# Patient Record
Sex: FEMALE | Race: WHITE | Employment: OTHER | URBAN - METROPOLITAN AREA
[De-identification: names, ages, dates, MRNs, and addresses within clinical notes are randomized per-mention and may not be internally consistent; named-entity substitution may affect disease eponyms.]

---

## 2017-01-04 ENCOUNTER — HOSPITAL ENCOUNTER (OUTPATIENT)
Dept: PHYSICAL THERAPY | Age: 67
Discharge: HOME | End: 2017-01-04
Payer: OTHER MISCELLANEOUS

## 2017-01-04 PROCEDURE — 97530 THERAPEUTIC ACTIVITIES: CPT

## 2017-01-04 PROCEDURE — 97161 PT EVAL LOW COMPLEX 20 MIN: CPT

## 2017-01-04 PROCEDURE — G8979 MOBILITY GOAL STATUS: HCPCS

## 2017-01-04 PROCEDURE — G8978 MOBILITY CURRENT STATUS: HCPCS

## 2017-01-04 NOTE — PROGRESS NOTES
[facility]  [employer] OF [address]  [phone] fax    Plan of Care/ Statement of Necessity for Physical Therapy Services    Patient name: Ryan Le Start of Care: 2017   Referral source: Bruno Rendon : 1950    Medical Diagnosis: There are no admission diagnoses documented for this encounter. Onset Date: fall 2/27/15, surgery 12/2/15    Treatment Diagnosis: s/p R SIJ fusion    Prior Hospitalization: see medical history Provider#: [id number]   Medications: Verified on Patient summary List    Comorbidities: osteoporosis, depression, multiple spinal fusions, neuropathy R foot   Prior Level of Function: lives alone in a 1 story home with 3 steps to enter and B HR, Ind with housework and ambulation prior to surgery, currently daughter assists with LE dressing and housework, rollator for ambulation since surgery, RN prior to fall 2/27/15      The Plan of Care and following information is based on the information from the initial evaluation. Assessment/ key information: Pt is a 72 yo F who presents s/p R SIJ fusion after failed conservative treatment following a fall 2/27/15 during which she slipped on ice. Subjective reports of pain increased with sitting >10 minutes, walking/standing immediately, getting up from sitting, and sidelying B and relieved with pain medication. She also reports new onset of R thigh pain since the surgery. Pt is TTP over R greater trochanter and along R ITB/quadriceps. Incision is healed without signs of complication. Pt demonstrates significantly decreased BLE strength R >L and LLD of 0.25cm, likely contributing to increased weight-bearing/weight-shift to R. Pt will benefit from skilled PT to address ROM, strength, flexibility, balance, core/hip stability, and functional mobility deficits in order to improve ease of weight-bearing tasks and return to PLOF.         Evaluation Complexity History HIGH Complexity :3+ comorbidities / personal factors will impact the outcome/ POC ; Examination MEDIUM Complexity : 3 Standardized tests and measures addressing body structure, function, activity limitation and / or participation in recreation  ;Presentation MEDIUM Complexity : Evolving with changing characteristics  ; Clinical Decision Making MEDIUM Complexity : FOTO score of 26-74  Overall Complexity Rating: MEDIUM  Problem List: pain affecting function, decrease ROM, decrease strength, impaired gait/ balance, decrease ADL/ functional abilitiies, decrease activity tolerance, decrease flexibility/ joint mobility and decrease transfer abilities   Treatment Plan may include any combination of the following: Therapeutic exercise, Therapeutic activities, Neuromuscular re-education, Physical agent/modality, Gait/balance training, Manual therapy, Patient education, Self Care training, Functional mobility training, Home safety training and Stair training  Patient / Family readiness to learn indicated by: asking questions, trying to perform skills and interest  Persons(s) to be included in education: patient (P) and family support person (FSP);list daughter  Barriers to Learning/Limitations: None  Patient Goal (s): better mobility, less pain  Patient Self Reported Health Status: poor  Rehabilitation Potential: good    Short Term Goals: To be accomplished in 1 weeks:  1. Pt will be compliant with initial HEP  Long Term Goals: To be accomplished in 6 weeks:  1. Pt will improve FOTO by 13 points in order to demonstrate functional improvement   2. Pt will improve B hip flex/IR/ER strength to 4/5 in order to improve hip stability for improved weight-bearing tolerance  3. Pt will improve standing/ambulatory tolerance to 10 minutes in order to improve pt's ability to participate in family functions  4. Pt will report <5/10 average pain in order to improve QOL.    Frequency / Duration: Patient to be seen 2-3 times per week for 6 weeks. Patient/ Caregiver education and instruction: Diagnosis, prognosis, exercises   [x]  Plan of care has been reviewed with PTA    G-Codes (GP)  Mobility   Current  CL= 60-79%   Goal  CK= 40-59%    The severity rating is based on clinical judgment and the FOTO score. Certification Period: 1/4/17 to 4/4/17  Julien Delaney, PT 1/4/2017 11:04 AM    ________________________________________________________________________    I certify that the above Therapy Services are being furnished while the patient is under my care. I agree with the treatment plan and certify that this therapy is necessary.     Physician's Signature:____________________  Date:____________Time: _________    Please sign and return to [facility]  [address] [facility]  [phone] fax
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
## 2017-01-06 ENCOUNTER — HOSPITAL ENCOUNTER (OUTPATIENT)
Dept: PHYSICAL THERAPY | Age: 67
Discharge: HOME | End: 2017-01-06
Payer: OTHER MISCELLANEOUS

## 2017-01-09 ENCOUNTER — APPOINTMENT (OUTPATIENT)
Dept: PHYSICAL THERAPY | Age: 67
End: 2017-01-09
Payer: OTHER MISCELLANEOUS

## 2017-01-11 ENCOUNTER — HOSPITAL ENCOUNTER (OUTPATIENT)
Dept: PHYSICAL THERAPY | Age: 67
End: 2017-01-11
Payer: OTHER MISCELLANEOUS

## 2017-01-12 ENCOUNTER — APPOINTMENT (OUTPATIENT)
Dept: PHYSICAL THERAPY | Age: 67
End: 2017-01-12
Payer: OTHER MISCELLANEOUS

## 2017-01-16 ENCOUNTER — APPOINTMENT (OUTPATIENT)
Dept: PHYSICAL THERAPY | Age: 67
End: 2017-01-16
Payer: OTHER MISCELLANEOUS

## 2017-01-18 ENCOUNTER — APPOINTMENT (OUTPATIENT)
Dept: PHYSICAL THERAPY | Age: 67
End: 2017-01-18
Payer: OTHER MISCELLANEOUS

## 2017-01-20 ENCOUNTER — APPOINTMENT (OUTPATIENT)
Dept: PHYSICAL THERAPY | Age: 67
End: 2017-01-20
Payer: OTHER MISCELLANEOUS

## 2017-01-23 ENCOUNTER — APPOINTMENT (OUTPATIENT)
Dept: PHYSICAL THERAPY | Age: 67
End: 2017-01-23
Payer: OTHER MISCELLANEOUS

## 2017-01-25 ENCOUNTER — APPOINTMENT (OUTPATIENT)
Dept: PHYSICAL THERAPY | Age: 67
End: 2017-01-25
Payer: OTHER MISCELLANEOUS

## 2017-01-27 ENCOUNTER — APPOINTMENT (OUTPATIENT)
Dept: PHYSICAL THERAPY | Age: 67
End: 2017-01-27
Payer: OTHER MISCELLANEOUS

## 2017-01-30 ENCOUNTER — APPOINTMENT (OUTPATIENT)
Dept: PHYSICAL THERAPY | Age: 67
End: 2017-01-30
Payer: OTHER MISCELLANEOUS

## 2017-02-01 ENCOUNTER — APPOINTMENT (OUTPATIENT)
Dept: PHYSICAL THERAPY | Age: 67
End: 2017-02-01

## 2017-02-03 ENCOUNTER — APPOINTMENT (OUTPATIENT)
Dept: PHYSICAL THERAPY | Age: 67
End: 2017-02-03

## 2017-02-20 ENCOUNTER — OFFICE VISIT (OUTPATIENT)
Dept: ORTHOPEDIC SURGERY | Facility: CLINIC | Age: 67
End: 2017-02-20

## 2017-02-20 VITALS
HEART RATE: 70 BPM | TEMPERATURE: 98 F | WEIGHT: 177 LBS | SYSTOLIC BLOOD PRESSURE: 148 MMHG | DIASTOLIC BLOOD PRESSURE: 82 MMHG | BODY MASS INDEX: 28.57 KG/M2

## 2017-02-20 DIAGNOSIS — M17.12 PRIMARY OSTEOARTHRITIS OF LEFT KNEE: Primary | ICD-10-CM

## 2017-02-20 DIAGNOSIS — Z98.1 S/P LUMBAR FUSION: ICD-10-CM

## 2017-02-20 DIAGNOSIS — M17.11 PRIMARY OSTEOARTHRITIS OF RIGHT KNEE: ICD-10-CM

## 2017-02-20 DIAGNOSIS — M25.562 LEFT KNEE PAIN, UNSPECIFIED CHRONICITY: ICD-10-CM

## 2017-02-20 RX ORDER — HYALURONATE SODIUM 10 MG/ML
2 SYRINGE (ML) INTRAARTICULAR ONCE
Qty: 2 ML | Refills: 0
Start: 2017-02-20 | End: 2017-02-20

## 2017-02-20 NOTE — PATIENT INSTRUCTIONS
Knee Arthritis: Exercises  Your Care Instructions  Here are some examples of exercises for knee arthritis. Start each exercise slowly. Ease off the exercise if you start to have pain. Your doctor or physical therapist will tell you when you can start these exercises and which ones will work best for you. How to do the exercises  Knee flexion with heel slide    1. Lie on your back with your knees bent. 2. Slide your heel back by bending your affected knee as far as you can. Then hook your other foot around your ankle to help pull your heel even farther back. 3. Hold for about 6 seconds, then rest for up to 10 seconds. 4. Repeat 8 to 12 times. 5. Switch legs and repeat steps 1 through 4, even if only one knee is sore. Quad sets    1. Sit with your affected leg straight and supported on the floor or a firm bed. Place a small, rolled-up towel under your knee. Your other leg should be bent, with that foot flat on the floor. 2. Tighten the thigh muscles of your affected leg by pressing the back of your knee down into the towel. 3. Hold for about 6 seconds, then rest for up to 10 seconds. 4. Repeat 8 to 12 times. 5. Switch legs and repeat steps 1 through 4, even if only one knee is sore. Straight-leg raises to the front    1. Lie on your back with your good knee bent so that your foot rests flat on the floor. Your affected leg should be straight. Make sure that your low back has a normal curve. You should be able to slip your hand in between the floor and the small of your back, with your palm touching the floor and your back touching the back of your hand. 2. Tighten the thigh muscles in your affected leg by pressing the back of your knee flat down to the floor. Hold your knee straight. 3. Keeping the thigh muscles tight and your leg straight, lift your affected leg up so that your heel is about 12 inches off the floor. Hold for about 6 seconds, then lower slowly.   4. Relax for up to 10 seconds between repetitions. 5. Repeat 8 to 12 times. 6. Switch legs and repeat steps 1 through 5, even if only one knee is sore. Active knee flexion    1. Lie on your stomach with your knees straight. If your kneecap is uncomfortable, roll up a washcloth and put it under your leg just above your kneecap. 2. Lift the foot of your affected leg by bending the knee so that you bring the foot up toward your buttock. If this motion hurts, try it without bending your knee quite as far. This may help you avoid any painful motion. 3. Slowly move your leg up and down. 4. Repeat 8 to 12 times. 5. Switch legs and repeat steps 1 through 4, even if only one knee is sore. Quadriceps stretch (facedown)    1. Lie flat on your stomach, and rest your face on the floor. 2. Wrap a towel or belt strap around the lower part of your affected leg. Then use the towel or belt strap to slowly pull your heel toward your buttock until you feel a stretch. 3. Hold for about 15 to 30 seconds, then relax your leg against the towel or belt strap. 4. Repeat 2 to 4 times. 5. Switch legs and repeat steps 1 through 4, even if only one knee is sore. Stationary exercise bike    If you do not have a stationary exercise bike at home, you can find one to ride at your local health club or community center. 1. Adjust the height of the bike seat so that your knee is slightly bent when your leg is extended downward. If your knee hurts when the pedal reaches the top, you can raise the seat so that your knee does not bend as much. 2. Start slowly. At first, try to do 5 to 10 minutes of cycling with little to no resistance. Then increase your time and the resistance bit by bit until you can do 20 to 30 minutes without pain. 3. If you start to have pain, rest your knee until your pain gets back to the level that is normal for you. Or cycle for less time or with less effort. Follow-up care is a key part of your treatment and safety.  Be sure to make and go to all appointments, and call your doctor if you are having problems. It's also a good idea to know your test results and keep a list of the medicines you take. Where can you learn more? Go to http://jag-michelle.info/. Enter C159 in the search box to learn more about \"Knee Arthritis: Exercises. \"  Current as of: May 23, 2016  Content Version: 11.1  © 2006-2016 [employer]. Care instructions adapted under license by [facility] (which disclaims liability or warranty for this information). If you have questions about a medical condition or this instruction, always ask your healthcare professional. [address] any warranty or liability for your use of this information. Hyaluronic Acid (By injection)   Hyaluronic Acid ([id number]-dd-ikp-ON-ate AS-id)  Treats severe pain in your knee due to osteoarthritis. Brand Name(s):Euflexxa, Gel-One, GenVisc 850, Hyalgan, Hymovis, Monovisc, Orthovisc, Supartz, Supartz FX   There may be other brand names for this medicine. When This Medicine Should Not Be Used: This medicine is not right for everyone. You should not receive it if you had an allergic reaction to hyaluronic acid or if you have a bleeding disorder. How to Use This Medicine:   Injectable  · Your doctor will tell you how many injections you will need. This medicine is injected into your knee joint. · A nurse or other health provider will give you this medicine. Drugs and Foods to Avoid:      Ask your doctor or pharmacist before using any other medicine, including over-the-counter medicines, vitamins, and herbal products. Warnings While Using This Medicine:   · Tell your doctor if you are pregnant or breastfeeding, or if you have any allergies, including to birds, feathers, or eggs. · Rest your knee for 48 hours after you receive an injection. Do not do strenuous, weightbearing activities, such as jogging or tennis.  Avoid activities that keep you standing for longer than 1 hour. Possible Side Effects While Using This Medicine:   Call your doctor right away if you notice any of these side effects:  · Allergic reaction: Itching or hives, swelling in your face or hands, swelling or tingling in your mouth or throat, chest tightness, trouble breathing  If you notice these less serious side effects, talk with your doctor:   · Mild increase in pain or swelling in your knee  · Pain, redness, or swelling where the medicine is injected  If you notice other side effects that you think are caused by this medicine, tell your doctor. Call your doctor for medical advice about side effects. You may report side effects to FDA at 2-574-FDA-3093  © 2016 [address] is for End User's use only and may not be sold, redistributed or otherwise used for commercial purposes. The above information is an  only. It is not intended as medical advice for individual conditions or treatments. Talk to your doctor, nurse or pharmacist before following any medical regimen to see if it is safe and effective for you.

## 2017-02-20 NOTE — PROGRESS NOTES
Patient: Cristi Walls                MRN: [id number]       SSN: [id number]  YOB: 1950        AGE: 78 y.o. SEX: female    PCP: Carol Baeza MD  17    CC:  Left knee and low back pain    HISTORY:  Cristi Walls is a 78 y.o. female who is seen for left knee and low back pain. She reports that her left knee pain began following her back surgery in 2015. She originally injured her back in 2015 when she slipped and fell on ice while at work. She notes that she previously underwent left knee surgery in  for a fractured patella and torn meniscus. She states that she has increased knee pain and swelling since 2016. She is experiencing a popping sensation with certain motions while laying in bed. She notes that she previously underwent gastroplasty in  by Dr. Kemar Denise in [facility] and lost close to 100 pounds. She states that she also underwent abdominoplasty following her gastric surgery. She is currently taking Vicodin and is underwent recent lumbosacral fuson surgery by Dr. Pravin Gilbert. She notes that she also underwent back surgery many years ago in [place]. She states that she was kicked by a horse in her left shin many years ago. Pain Assessment  2017   Location of Pain Knee   Location Modifiers Left   Severity of Pain 4   Quality of Pain Dull;Aching   Duration of Pain Persistent   Frequency of Pain Several times daily   Aggravating Factors Bending   Limiting Behavior Some   Relieving Factors Rest     Occupation, etc:  Ms. Haven Wiggins is currently receiving workman's comp for her slip-and-fall episode on ice in 2015. She was previously an RN at [employer]. Her daughter is also a patient. She lives in [place] with her 3 dogs, one dachshund, one deer-head dachshund, and one dachshund-chihuahua mix.   She states that she previously worked at the [address] [place] [address],[employer] at [employer] [place] many years ago. Current weight is 177 pounds. Weight Metrics 2/20/2017 12/2/2016 11/17/2016 9/29/2016   Weight 177 lb 186 lb 1.1 oz 180 lb 178 lb   BMI 28.57 kg/m2 30.03 kg/m2 28.62 kg/m2 28.73 kg/m2     Patient Active Problem List   Diagnosis Code    [facility] M46.98     REVIEW OF SYSTEMS: All Below are Negative except: See HPI   Constitutional: negative for fever, chills, and weight loss. Cardiovascular: negative for chest pain, claudication, leg swelling, SOB, WATERMAN   Gastrointestinal: Negative for pain, N/V/C/D, Blood in stool or urine, dysuria,  hematuria, incontinence, pelvic pain. Musculoskeletal: See HPI   Neurological: Negative for dizziness and weakness. Negative for headaches, Visual changes, confusion, seizures   Phychiatric/Behavioral: Negative for depression, memory loss, substance  abuse. Extremities: Negative for hair changes, rash, or skin lesion changes. Hematologic: Negative for bleeding problems, bruising, pallor or swollen lymph  nodes   Peripheral Vascular: No calf pain, no circulation deficits. Social History     Social History    Marital status:      Spouse name: N/A    Number of children: N/A    Years of education: N/A     Occupational History    Not on file. Social History Main Topics    Smoking status: Never Smoker    Smokeless tobacco: Not on file    Alcohol use No    Drug use: No    Sexual activity: Not on file     Other Topics Concern    Not on file     Social History Narrative      No Known Allergies   Current Outpatient Prescriptions   Medication Sig    diazePAM (VALIUM) 5 mg tablet Take 5 mg by mouth every six (6) hours as needed for Anxiety.  calcium-cholecalciferol, d3, (CALCIUM 600 + D) 600-125 mg-unit tab Take 1 Tab by mouth daily.  PYRIDOXINE HCL (VITAMIN B-6 PO) Take 1 Tab by mouth daily.     gabapentin (NEURONTIN) 300 mg capsule TAKE 1 CAPSULE BY MOUTH EVERY MORNING, 2 CAPSULES EVERY AFTERNOON, AND 3 CAPSULES AT NIGHT    HYDROcodone-acetaminophen (NORCO)  mg tablet TAKE 1 TABLET BY MOUTH EVERY 8 HOURS AS NEEDED    citalopram (CELEXA) 40 mg tablet Take 40 mg by mouth daily.  melatonin tab tablet Take  by mouth nightly.  alendronate (FOSAMAX) 70 mg tablet Take 70 mg by mouth every seven (7) days.  oxyCODONE-acetaminophen (PERCOCET) 5-325 mg per tablet Take 1-2 Tabs by mouth every six (6) hours as needed for Pain. Max Daily Amount: 8 Tabs.  cyclobenzaprine (FLEXERIL) 10 mg tablet Take 1 Tab by mouth three (3) times daily as needed for Muscle Spasm(s). No current facility-administered medications for this visit. PHYSICAL EXAMINATION:  Visit Vitals    /82 (BP 1 Location: Left arm, BP Patient Position: Sitting)    Pulse 70    Temp 98 °F (36.7 °C) (Oral)    Wt 177 lb (80.3 kg)    BMI 28.57 kg/m2     ORTHO EXAMINATION:  Examination Lumbar   Skin Intact   Tenderness +   Tightness +   Lordosis Normal   Kyphosis N/A   Scoliosis -   Flexion Fingertips to ankle   Extension 10   Knee reflexes Normal   Ankle reflexes Normal   Straight leg raise -   Calf tenderness -     Examination Right knee Left knee   Skin Intact Intact   Range of motion 120-0 120-0   Effusion - -   Medial joint line tenderness + +, anteromedial   Lateral joint line tenderness - -   Popliteal tenderness - -   Osteophytes palpable + +   Belles - -   Patella crepitus + +   Anterior drawer - -   Lateral laxity - -   Medial laxity - -   Varus deformity - -   Valgus deformity - -   Pretibial edema - -   Calf tenderness - -   Anteromedial swelling    PROCEDURE:  After discussing treatment options, patient's left knee was injected with 2 cc of Euflexxa.     Chart reviewed for the following:   Belen Armendariz MD, have reviewed the History, Physical and updated the Allergic reactions for [address] [place] [address] performed immediately prior to start of procedure:  Belen Armendariz MD, have performed the following reviews on [employer] prior to the start of the procedure:            * Patient was identified by name and date of birth   * Agreement on procedure being performed was verified  * Risks and Benefits explained to the patient  * Procedure site verified and marked as necessary  * Patient was positioned for comfort  * Consent was obtained     Time: 3:39 PM     Date of procedure: 2/20/2017    Procedure performed by:  Nithin Tran MD    Ms. Rojas tolerated the procedure well with no complications. RADIOGRAPHS:  XR LEFT KNEE 9/29/16  IMPRESSION:  No fractures, no effusion, moderate joint space narrowing bilateral (L>R), no osteophytes present. IMPRESSION:      ICD-10-CM ICD-9-CM    1. Primary osteoarthritis of left knee M17.12 715.16 CA DRAIN/INJECT LARGE JOINT/BURSA      EUFLEXXA INJECTION PER DOSE      sodium hyaluronate (SUPARTZ FX/HYALGAN/GENIVSC) 10 mg/mL syrg injection    Moderate   2. Left knee pain, unspecified chronicity M25.562 719.46 CA DRAIN/INJECT LARGE JOINT/BURSA      EUFLEXXA INJECTION PER DOSE      sodium hyaluronate (SUPARTZ FX/HYALGAN/GENIVSC) 10 mg/mL syrg injection   3. Primary osteoarthritis of right knee M17.11 715.16     Moderate   4. S/P lumbar fusion Z98.1 V45.4      PLAN:  After discussing treatment options, patient's left knee was injected with 2 cc of Euflexxa. I will see her back in 1 week for her second Euflexxa injection. We discussed a possible left knee MRI in the future if pain continues. She will continue to follow up with Dr. Fabiola Delaney for her work related low back pain.       Scribed by [employer] ([address]) as dictated by Nithin Tran MD

## 2017-02-24 ENCOUNTER — OFFICE VISIT (OUTPATIENT)
Dept: ORTHOPEDIC SURGERY | Facility: CLINIC | Age: 67
End: 2017-02-24

## 2017-02-24 VITALS
HEIGHT: 66 IN | WEIGHT: 175.5 LBS | HEART RATE: 87 BPM | BODY MASS INDEX: 28.21 KG/M2 | DIASTOLIC BLOOD PRESSURE: 99 MMHG | TEMPERATURE: 98.2 F | SYSTOLIC BLOOD PRESSURE: 136 MMHG

## 2017-02-24 DIAGNOSIS — M17.12 PRIMARY OSTEOARTHRITIS OF LEFT KNEE: Primary | ICD-10-CM

## 2017-02-24 DIAGNOSIS — M25.562 LEFT KNEE PAIN, UNSPECIFIED CHRONICITY: ICD-10-CM

## 2017-02-24 RX ORDER — HYALURONATE SODIUM 10 MG/ML
2 SYRINGE (ML) INTRAARTICULAR ONCE
Qty: 2 ML | Refills: 0
Start: 2017-02-24 | End: 2017-02-24

## 2017-02-24 NOTE — PATIENT INSTRUCTIONS
Knee Arthritis: Exercises  Your Care Instructions  Here are some examples of exercises for knee arthritis. Start each exercise slowly. Ease off the exercise if you start to have pain. Your doctor or physical therapist will tell you when you can start these exercises and which ones will work best for you. How to do the exercises  Knee flexion with heel slide    1. Lie on your back with your knees bent. 2. Slide your heel back by bending your affected knee as far as you can. Then hook your other foot around your ankle to help pull your heel even farther back. 3. Hold for about 6 seconds, then rest for up to 10 seconds. 4. Repeat 8 to 12 times. 5. Switch legs and repeat steps 1 through 4, even if only one knee is sore. Quad sets    1. Sit with your affected leg straight and supported on the floor or a firm bed. Place a small, rolled-up towel under your knee. Your other leg should be bent, with that foot flat on the floor. 2. Tighten the thigh muscles of your affected leg by pressing the back of your knee down into the towel. 3. Hold for about 6 seconds, then rest for up to 10 seconds. 4. Repeat 8 to 12 times. 5. Switch legs and repeat steps 1 through 4, even if only one knee is sore. Straight-leg raises to the front    1. Lie on your back with your good knee bent so that your foot rests flat on the floor. Your affected leg should be straight. Make sure that your low back has a normal curve. You should be able to slip your hand in between the floor and the small of your back, with your palm touching the floor and your back touching the back of your hand. 2. Tighten the thigh muscles in your affected leg by pressing the back of your knee flat down to the floor. Hold your knee straight. 3. Keeping the thigh muscles tight and your leg straight, lift your affected leg up so that your heel is about 12 inches off the floor. Hold for about 6 seconds, then lower slowly.   4. Relax for up to 10 seconds between repetitions. 5. Repeat 8 to 12 times. 6. Switch legs and repeat steps 1 through 5, even if only one knee is sore. Active knee flexion    1. Lie on your stomach with your knees straight. If your kneecap is uncomfortable, roll up a washcloth and put it under your leg just above your kneecap. 2. Lift the foot of your affected leg by bending the knee so that you bring the foot up toward your buttock. If this motion hurts, try it without bending your knee quite as far. This may help you avoid any painful motion. 3. Slowly move your leg up and down. 4. Repeat 8 to 12 times. 5. Switch legs and repeat steps 1 through 4, even if only one knee is sore. Quadriceps stretch (facedown)    1. Lie flat on your stomach, and rest your face on the floor. 2. Wrap a towel or belt strap around the lower part of your affected leg. Then use the towel or belt strap to slowly pull your heel toward your buttock until you feel a stretch. 3. Hold for about 15 to 30 seconds, then relax your leg against the towel or belt strap. 4. Repeat 2 to 4 times. 5. Switch legs and repeat steps 1 through 4, even if only one knee is sore. Stationary exercise bike    If you do not have a stationary exercise bike at home, you can find one to ride at your local health club or community center. 1. Adjust the height of the bike seat so that your knee is slightly bent when your leg is extended downward. If your knee hurts when the pedal reaches the top, you can raise the seat so that your knee does not bend as much. 2. Start slowly. At first, try to do 5 to 10 minutes of cycling with little to no resistance. Then increase your time and the resistance bit by bit until you can do 20 to 30 minutes without pain. 3. If you start to have pain, rest your knee until your pain gets back to the level that is normal for you. Or cycle for less time or with less effort. Follow-up care is a key part of your treatment and safety.  Be sure to make and go to all appointments, and call your doctor if you are having problems. It's also a good idea to know your test results and keep a list of the medicines you take. Where can you learn more? Go to http://jag-michelle.info/. Enter C159 in the search box to learn more about \"Knee Arthritis: Exercises. \"  Current as of: May 23, 2016  Content Version: 11.1  © 2006-2016 [employer]. Care instructions adapted under license by [employer] (which disclaims liability or warranty for this information). If you have questions about a medical condition or this instruction, always ask your healthcare professional. [address] any warranty or liability for your use of this information. Hyaluronic Acid (By injection)   Hyaluronic Acid (cmn-zw-pfm-ON-ate AS-id)  Treats severe pain in your knee due to osteoarthritis. Brand Name(s):Euflexxa, Gel-One, GenVisc 850, Hyalgan, Hymovis, Monovisc, Orthovisc, Supartz, Supartz FX   There may be other brand names for this medicine. When This Medicine Should Not Be Used: This medicine is not right for everyone. You should not receive it if you had an allergic reaction to hyaluronic acid or if you have a bleeding disorder. How to Use This Medicine:   Injectable  · Your doctor will tell you how many injections you will need. This medicine is injected into your knee joint. · A nurse or other health provider will give you this medicine. Drugs and Foods to Avoid:      Ask your doctor or pharmacist before using any other medicine, including over-the-counter medicines, vitamins, and herbal products. Warnings While Using This Medicine:   · Tell your doctor if you are pregnant or breastfeeding, or if you have any allergies, including to birds, feathers, or eggs. · Rest your knee for 48 hours after you receive an injection. Do not do strenuous, weightbearing activities, such as jogging or tennis.  Avoid activities that keep you standing for longer than 1 hour. Possible Side Effects While Using This Medicine:   Call your doctor right away if you notice any of these side effects:  · Allergic reaction: Itching or hives, swelling in your face or hands, swelling or tingling in your mouth or throat, chest tightness, trouble breathing  If you notice these less serious side effects, talk with your doctor:   · Mild increase in pain or swelling in your knee  · Pain, redness, or swelling where the medicine is injected  If you notice other side effects that you think are caused by this medicine, tell your doctor. Call your doctor for medical advice about side effects. You may report side effects to FDA at 3-952-FDA-5035  © 2016 [address] is for End User's use only and may not be sold, redistributed or otherwise used for commercial purposes. The above information is an  only. It is not intended as medical advice for individual conditions or treatments. Talk to your doctor, nurse or pharmacist before following any medical regimen to see if it is safe and effective for you.

## 2017-02-24 NOTE — PROGRESS NOTES
Patient: Cristi Walls                MRN: [id number]       SSN: [id number]  YOB: 1950        AGE: 78 y.o. SEX: female  Body mass index is 28.33 kg/(m^2). PCP: Carol Baeza MD  17    Chief Complaint   Patient presents with    Knee Pain     Left     HISTORY:  Cristi Walls is a 78 y.o. female who is seen for left knee pain. ICD-10-CM ICD-9-CM    1. Primary osteoarthritis of left knee M17.12 715.16 MD DRAIN/INJECT LARGE JOINT/BURSA      EUFLEXXA INJECTION PER DOSE      sodium hyaluronate (SUPARTZ FX/HYALGAN/GENIVSC) 10 mg/mL syrg injection   2. Left knee pain, unspecified chronicity M25.562 719.46 MD DRAIN/INJECT LARGE JOINT/BURSA      EUFLEXXA INJECTION PER DOSE      sodium hyaluronate (SUPARTZ FX/HYALGAN/GENIVSC) 10 mg/mL syrg injection     PROCEDURE:  Ms. Elinor Wilburn left knee was injected with 2 cc of Euflexxa. Chart reviewed for the following:   Barbee Barthel, MD, have reviewed the History, Physical and updated the Allergic reactions for [address] [place] [address] performed immediately prior to start of procedure:  Barbee Barthel, MD, have performed the following reviews on Cristi Walls prior to the start of the procedure:            * Patient was identified by name and date of birth   * Agreement on procedure being performed was verified  * Risks and Benefits explained to the patient  * Procedure site verified and marked as necessary  * Patient was positioned for comfort  * Consent was obtained     Time: 4:26 PM     Date of procedure: 2017    Procedure performed by:  Keisha French MD    Ms. Rojas tolerated the procedure well with no complications. PLAN:  Ms. Haven Wiggins will follow up in one week to complete her Euflexxa injection series.

## 2017-03-03 ENCOUNTER — OFFICE VISIT (OUTPATIENT)
Dept: ORTHOPEDIC SURGERY | Facility: CLINIC | Age: 67
End: 2017-03-03

## 2017-03-03 VITALS
BODY MASS INDEX: 28.16 KG/M2 | TEMPERATURE: 97.3 F | SYSTOLIC BLOOD PRESSURE: 121 MMHG | WEIGHT: 175.2 LBS | HEART RATE: 84 BPM | HEIGHT: 66 IN | DIASTOLIC BLOOD PRESSURE: 78 MMHG

## 2017-03-03 DIAGNOSIS — M17.12 PRIMARY OSTEOARTHRITIS OF LEFT KNEE: Primary | ICD-10-CM

## 2017-03-03 DIAGNOSIS — M17.11 PRIMARY OSTEOARTHRITIS OF RIGHT KNEE: ICD-10-CM

## 2017-03-03 DIAGNOSIS — Z98.1 S/P LUMBAR FUSION: ICD-10-CM

## 2017-03-03 DIAGNOSIS — M25.562 LEFT KNEE PAIN, UNSPECIFIED CHRONICITY: ICD-10-CM

## 2017-03-03 RX ORDER — HYALURONATE SODIUM 10 MG/ML
2 SYRINGE (ML) INTRAARTICULAR ONCE
Qty: 2 ML | Refills: 0
Start: 2017-03-03 | End: 2017-03-06

## 2017-03-03 NOTE — PATIENT INSTRUCTIONS
Knee Arthritis: Exercises  Your Care Instructions  Here are some examples of exercises for knee arthritis. Start each exercise slowly. Ease off the exercise if you start to have pain. Your doctor or physical therapist will tell you when you can start these exercises and which ones will work best for you. How to do the exercises  Knee flexion with heel slide    1. Lie on your back with your knees bent. 2. Slide your heel back by bending your affected knee as far as you can. Then hook your other foot around your ankle to help pull your heel even farther back. 3. Hold for about 6 seconds, then rest for up to 10 seconds. 4. Repeat 8 to 12 times. 5. Switch legs and repeat steps 1 through 4, even if only one knee is sore. Quad sets    1. Sit with your affected leg straight and supported on the floor or a firm bed. Place a small, rolled-up towel under your knee. Your other leg should be bent, with that foot flat on the floor. 2. Tighten the thigh muscles of your affected leg by pressing the back of your knee down into the towel. 3. Hold for about 6 seconds, then rest for up to 10 seconds. 4. Repeat 8 to 12 times. 5. Switch legs and repeat steps 1 through 4, even if only one knee is sore. Straight-leg raises to the front    1. Lie on your back with your good knee bent so that your foot rests flat on the floor. Your affected leg should be straight. Make sure that your low back has a normal curve. You should be able to slip your hand in between the floor and the small of your back, with your palm touching the floor and your back touching the back of your hand. 2. Tighten the thigh muscles in your affected leg by pressing the back of your knee flat down to the floor. Hold your knee straight. 3. Keeping the thigh muscles tight and your leg straight, lift your affected leg up so that your heel is about 12 inches off the floor. Hold for about 6 seconds, then lower slowly.   4. Relax for up to 10 seconds between repetitions. 5. Repeat 8 to 12 times. 6. Switch legs and repeat steps 1 through 5, even if only one knee is sore. Active knee flexion    1. Lie on your stomach with your knees straight. If your kneecap is uncomfortable, roll up a washcloth and put it under your leg just above your kneecap. 2. Lift the foot of your affected leg by bending the knee so that you bring the foot up toward your buttock. If this motion hurts, try it without bending your knee quite as far. This may help you avoid any painful motion. 3. Slowly move your leg up and down. 4. Repeat 8 to 12 times. 5. Switch legs and repeat steps 1 through 4, even if only one knee is sore. Quadriceps stretch (facedown)    1. Lie flat on your stomach, and rest your face on the floor. 2. Wrap a towel or belt strap around the lower part of your affected leg. Then use the towel or belt strap to slowly pull your heel toward your buttock until you feel a stretch. 3. Hold for about 15 to 30 seconds, then relax your leg against the towel or belt strap. 4. Repeat 2 to 4 times. 5. Switch legs and repeat steps 1 through 4, even if only one knee is sore. Stationary exercise bike    If you do not have a stationary exercise bike at home, you can find one to ride at your local health club or community center. 1. Adjust the height of the bike seat so that your knee is slightly bent when your leg is extended downward. If your knee hurts when the pedal reaches the top, you can raise the seat so that your knee does not bend as much. 2. Start slowly. At first, try to do 5 to 10 minutes of cycling with little to no resistance. Then increase your time and the resistance bit by bit until you can do 20 to 30 minutes without pain. 3. If you start to have pain, rest your knee until your pain gets back to the level that is normal for you. Or cycle for less time or with less effort. Follow-up care is a key part of your treatment and safety.  Be sure to make and go to all appointments, and call your doctor if you are having problems. It's also a good idea to know your test results and keep a list of the medicines you take. Where can you learn more? Go to http://jag-michelle.info/. Enter C159 in the search box to learn more about \"Knee Arthritis: Exercises. \"  Current as of: May 23, 2016  Content Version: 11.1  © 2006-2016 [employer]. Care instructions adapted under license by [employer] (which disclaims liability or warranty for this information). If you have questions about a medical condition or this instruction, always ask your healthcare professional. [address] any warranty or liability for your use of this information. Hyaluronic Acid (By injection)   Hyaluronic Acid (qpu-vd-rbz-ON-ate AS-id)  Treats severe pain in your knee due to osteoarthritis. Brand Name(s):Euflexxa, Gel-One, GenVisc 850, Hyalgan, Hymovis, Monovisc, Orthovisc, Supartz, Supartz FX   There may be other brand names for this medicine. When This Medicine Should Not Be Used: This medicine is not right for everyone. You should not receive it if you had an allergic reaction to hyaluronic acid or if you have a bleeding disorder. How to Use This Medicine:   Injectable  · Your doctor will tell you how many injections you will need. This medicine is injected into your knee joint. · A nurse or other health provider will give you this medicine. Drugs and Foods to Avoid:      Ask your doctor or pharmacist before using any other medicine, including over-the-counter medicines, vitamins, and herbal products. Warnings While Using This Medicine:   · Tell your doctor if you are pregnant or breastfeeding, or if you have any allergies, including to birds, feathers, or eggs. · Rest your knee for 48 hours after you receive an injection. Do not do strenuous, weightbearing activities, such as jogging or tennis.  Avoid activities that keep you standing for longer than 1 hour. Possible Side Effects While Using This Medicine:   Call your doctor right away if you notice any of these side effects:  · Allergic reaction: Itching or hives, swelling in your face or hands, swelling or tingling in your mouth or throat, chest tightness, trouble breathing  If you notice these less serious side effects, talk with your doctor:   · Mild increase in pain or swelling in your knee  · Pain, redness, or swelling where the medicine is injected  If you notice other side effects that you think are caused by this medicine, tell your doctor. Call your doctor for medical advice about side effects. You may report side effects to FDA at 3-674-FDA-2741  © 2016 [address] is for End User's use only and may not be sold, redistributed or otherwise used for commercial purposes. The above information is an  only. It is not intended as medical advice for individual conditions or treatments. Talk to your doctor, nurse or pharmacist before following any medical regimen to see if it is safe and effective for you.

## 2017-03-03 NOTE — PROGRESS NOTES
Patient: Berny Tomas                MRN: [id number]       SSN: [id number]  YOB: 1950        AGE: 78 y.o. SEX: female  Body mass index is 28.28 kg/(m^2). PCP: Reece Kaye MD  03/03/17    Chief Complaint   Patient presents with    Knee Pain     Left     HISTORY:  Berny Tomas is a 78 y.o. female who is seen for left knee pain. PROCEDURE:  Ms. Tiffany Kemp left knee was injected with 2 cc of Euflexxa. TIME OUT performed immediately prior to start of procedure:  Ly Diaz MD, have performed the following reviews on Berny Tomas prior to the start of the procedure:            * Patient was identified by name and date of birth   * Agreement on procedure being performed was verified  * Risks and Benefits explained to the patient  * Procedure site verified and marked as necessary  * Patient was positioned for comfort  * Consent was obtained     Time: 3:18 PM     Date of procedure: 3/3/2017    Procedure performed by:  Yola Dahl MD    Ms. Rojas tolerated the procedure well with no complications      [id number] ICD-9-CM    1. Primary osteoarthritis of left knee M17.12 715.16 AR DRAIN/INJECT LARGE JOINT/BURSA      EUFLEXXA INJECTION PER DOSE      sodium hyaluronate (SUPARTZ FX/HYALGAN/GENIVSC) 10 mg/mL syrg injection   2. Left knee pain, unspecified chronicity M25.562 719.46 AR DRAIN/INJECT LARGE JOINT/BURSA      EUFLEXXA INJECTION PER DOSE      sodium hyaluronate (SUPARTZ FX/HYALGAN/GENIVSC) 10 mg/mL syrg injection   3. Primary osteoarthritis of right knee M17.11 715.16    4. S/P lumbar fusion Z98.1 V45.4      PLAN:  Ms. Fatmata Hurst will follow up PRN now that she has completed her Euflexxa injection series.

## 2017-03-06 RX ORDER — HYALURONATE SODIUM 10 MG/ML
2 SYRINGE (ML) INTRAARTICULAR ONCE
Qty: 2 ML | Refills: 0
Start: 2017-03-06 | End: 2017-03-06

## 2017-07-05 NOTE — PROGRESS NOTES
[facility]  [address] [facility]  [phone] fax    Physical Therapy Discharge Summary    Patient name: Reanna Caicedo Start of Care: 17   Referral source: Nilda Mao : 1950   Medical/Treatment Diagnosis: Right hip pain [M25.551] Onset Date:fall 2/27/15, surgery 12/2/15      Prior Hospitalization: see medical history Provider#: [id number]   Medications: Verified on Patient Summary List     Comorbidities: osteoporosis, depression, multiple spinal fusions, neuropathy R foot   Prior Level of Function: lives alone in a 1 story home with 3 steps to enter and B HR, Ind with housework and ambulation prior to surgery, currently daughter assists with LE dressing and housework, rollator for ambulation since surgery, RN prior to fall 2/27/15    Visits from Start of Care: 1    Missed Visits: 0    Reporting Period : 17 to 17    Summary of Care:  Short Term Goals: To be accomplished in 1 weeks:  1. Pt will be compliant with initial HEP  Long Term Goals: To be accomplished in 6 weeks:  1. Pt will improve FOTO by 13 points in order to demonstrate functional improvement   2. Pt will improve B hip flex/IR/ER strength to 4/5 in order to improve hip stability for improved weight-bearing tolerance  3. Pt will improve standing/ambulatory tolerance to 10 minutes in order to improve pt's ability to participate in family functions  4. Pt will report <5/10 average pain in order to improve QOL. No goals met as pt did not return to therapy after initial evaluation       ASSESSMENT/RECOMMENDATIONS:  Workers Comp instructed transfer to a different facility. DC at this time.    [x]Discontinue therapy: []Patient has reached or is progressing toward set goals      []Patient is non-compliant or has abdicated      [x]Due to lack of insurance authorization     Humza Elliott, PT 2017 12:10 PM

## 2019-02-19 ENCOUNTER — HOSPITAL ENCOUNTER (INPATIENT)
Age: 69
LOS: 2 days | Discharge: HOME | DRG: 392 | End: 2019-02-21
Attending: EMERGENCY MEDICINE | Admitting: INTERNAL MEDICINE
Payer: MEDICARE

## 2019-02-19 ENCOUNTER — APPOINTMENT (OUTPATIENT)
Dept: CT IMAGING | Age: 69
DRG: 392 | End: 2019-02-19
Attending: EMERGENCY MEDICINE
Payer: MEDICARE

## 2019-02-19 ENCOUNTER — APPOINTMENT (OUTPATIENT)
Dept: GENERAL RADIOLOGY | Age: 69
DRG: 392 | End: 2019-02-19
Attending: EMERGENCY MEDICINE
Payer: MEDICARE

## 2019-02-19 DIAGNOSIS — N39.0 URINARY TRACT INFECTION WITHOUT HEMATURIA, SITE UNSPECIFIED: ICD-10-CM

## 2019-02-19 DIAGNOSIS — R11.2 NON-INTRACTABLE VOMITING WITH NAUSEA, UNSPECIFIED VOMITING TYPE: ICD-10-CM

## 2019-02-19 DIAGNOSIS — E87.20 LACTIC ACID ACIDOSIS: ICD-10-CM

## 2019-02-19 DIAGNOSIS — R05.9 COUGH: Primary | ICD-10-CM

## 2019-02-19 DIAGNOSIS — E87.6 HYPOKALEMIA: ICD-10-CM

## 2019-02-19 DIAGNOSIS — R19.7 DIARRHEA, UNSPECIFIED TYPE: ICD-10-CM

## 2019-02-19 PROBLEM — R11.10 VOMITING: Status: ACTIVE | Noted: 2019-02-19

## 2019-02-19 PROBLEM — Z78.9 RECENT FOREIGN TRAVEL: Status: ACTIVE | Noted: 2019-02-19

## 2019-02-19 LAB
ALBUMIN SERPL-MCNC: 3.6 G/DL (ref 3.4–5)
ALBUMIN/GLOB SERPL: 0.9 {RATIO} (ref 0.8–1.7)
ALP SERPL-CCNC: 105 U/L (ref 45–117)
ALT SERPL-CCNC: 13 U/L (ref 13–56)
ANION GAP SERPL CALC-SCNC: 9 MMOL/L (ref 3–18)
APPEARANCE UR: CLEAR
AST SERPL-CCNC: 14 U/L (ref 15–37)
ATRIAL RATE: 77 BPM
BACTERIA URNS QL MICRO: ABNORMAL /HPF
BASOPHILS # BLD: 0 K/UL (ref 0–0.1)
BASOPHILS NFR BLD: 0 % (ref 0–2)
BILIRUB SERPL-MCNC: 0.8 MG/DL (ref 0.2–1)
BILIRUB UR QL: NEGATIVE
BUN SERPL-MCNC: 10 MG/DL (ref 7–18)
BUN/CREAT SERPL: 11 (ref 12–20)
CALCIUM SERPL-MCNC: 9 MG/DL (ref 8.5–10.1)
CALCULATED P AXIS, ECG09: -6 DEGREES
CALCULATED R AXIS, ECG10: 23 DEGREES
CALCULATED T AXIS, ECG11: 24 DEGREES
CHLORIDE SERPL-SCNC: 103 MMOL/L (ref 100–108)
CO2 SERPL-SCNC: 25 MMOL/L (ref 21–32)
COLOR UR: YELLOW
CREAT SERPL-MCNC: 0.93 MG/DL (ref 0.6–1.3)
DIAGNOSIS, 93000: NORMAL
DIFFERENTIAL METHOD BLD: NORMAL
EOSINOPHIL # BLD: 0.1 K/UL (ref 0–0.4)
EOSINOPHIL NFR BLD: 1 % (ref 0–5)
EPITH CASTS URNS QL MICRO: ABNORMAL /LPF (ref 0–5)
ERYTHROCYTE [DISTWIDTH] IN BLOOD BY AUTOMATED COUNT: 12.8 % (ref 11.6–14.5)
GLOBULIN SER CALC-MCNC: 4 G/DL (ref 2–4)
GLUCOSE BLD STRIP.AUTO-MCNC: 98 MG/DL (ref 70–110)
GLUCOSE SERPL-MCNC: 101 MG/DL (ref 74–99)
GLUCOSE UR STRIP.AUTO-MCNC: NEGATIVE MG/DL
HCT VFR BLD AUTO: 39.9 % (ref 35–45)
HGB BLD-MCNC: 13.3 G/DL (ref 12–16)
HGB UR QL STRIP: NEGATIVE
KETONES UR QL STRIP.AUTO: 15 MG/DL
LACTATE BLD-SCNC: 3.72 MMOL/L (ref 0.4–2)
LACTATE BLD-SCNC: 3.77 MMOL/L (ref 0.4–2)
LEUKOCYTE ESTERASE UR QL STRIP.AUTO: ABNORMAL
LYMPHOCYTES # BLD: 2.5 K/UL (ref 0.9–3.6)
LYMPHOCYTES NFR BLD: 27 % (ref 21–52)
MAGNESIUM SERPL-MCNC: 2 MG/DL (ref 1.6–2.6)
MCH RBC QN AUTO: 30.6 PG (ref 24–34)
MCHC RBC AUTO-ENTMCNC: 33.3 G/DL (ref 31–37)
MCV RBC AUTO: 91.9 FL (ref 74–97)
MONOCYTES # BLD: 0.8 K/UL (ref 0.05–1.2)
MONOCYTES NFR BLD: 9 % (ref 3–10)
NEUTS SEG # BLD: 6 K/UL (ref 1.8–8)
NEUTS SEG NFR BLD: 63 % (ref 40–73)
NITRITE UR QL STRIP.AUTO: POSITIVE
P-R INTERVAL, ECG05: 144 MS
PH UR STRIP: 7 [PH] (ref 5–8)
PLATELET # BLD AUTO: 326 K/UL (ref 135–420)
PMV BLD AUTO: 10.9 FL (ref 9.2–11.8)
POTASSIUM SERPL-SCNC: 3 MMOL/L (ref 3.5–5.5)
PROT SERPL-MCNC: 7.6 G/DL (ref 6.4–8.2)
PROT UR STRIP-MCNC: NEGATIVE MG/DL
Q-T INTERVAL, ECG07: 370 MS
QRS DURATION, ECG06: 76 MS
QTC CALCULATION (BEZET), ECG08: 418 MS
RBC # BLD AUTO: 4.34 M/UL (ref 4.2–5.3)
RBC #/AREA URNS HPF: ABNORMAL /HPF (ref 0–5)
SODIUM SERPL-SCNC: 137 MMOL/L (ref 136–145)
SP GR UR REFRACTOMETRY: 1.01 (ref 1–1.03)
UROBILINOGEN UR QL STRIP.AUTO: 0.2 EU/DL (ref 0.2–1)
VENTRICULAR RATE, ECG03: 77 BPM
WBC # BLD AUTO: 9.4 K/UL (ref 4.6–13.2)
WBC URNS QL MICRO: ABNORMAL /HPF (ref 0–4)

## 2019-02-19 PROCEDURE — 87077 CULTURE AEROBIC IDENTIFY: CPT

## 2019-02-19 PROCEDURE — 74011636320 HC RX REV CODE- 636/320: Performed by: EMERGENCY MEDICINE

## 2019-02-19 PROCEDURE — 80053 COMPREHEN METABOLIC PANEL: CPT

## 2019-02-19 PROCEDURE — 99285 EMERGENCY DEPT VISIT HI MDM: CPT

## 2019-02-19 PROCEDURE — 96367 TX/PROPH/DG ADDL SEQ IV INF: CPT

## 2019-02-19 PROCEDURE — 85025 COMPLETE CBC W/AUTO DIFF WBC: CPT

## 2019-02-19 PROCEDURE — 83735 ASSAY OF MAGNESIUM: CPT

## 2019-02-19 PROCEDURE — 74011250636 HC RX REV CODE- 250/636: Performed by: EMERGENCY MEDICINE

## 2019-02-19 PROCEDURE — 74177 CT ABD & PELVIS W/CONTRAST: CPT

## 2019-02-19 PROCEDURE — 74011250636 HC RX REV CODE- 250/636: Performed by: INTERNAL MEDICINE

## 2019-02-19 PROCEDURE — 87186 SC STD MICRODIL/AGAR DIL: CPT

## 2019-02-19 PROCEDURE — 82962 GLUCOSE BLOOD TEST: CPT

## 2019-02-19 PROCEDURE — 77030029684 HC NEB SM VOL KT MONA -A

## 2019-02-19 PROCEDURE — 74011000258 HC RX REV CODE- 258: Performed by: INTERNAL MEDICINE

## 2019-02-19 PROCEDURE — 71045 X-RAY EXAM CHEST 1 VIEW: CPT

## 2019-02-19 PROCEDURE — 74011000250 HC RX REV CODE- 250: Performed by: EMERGENCY MEDICINE

## 2019-02-19 PROCEDURE — 74011000258 HC RX REV CODE- 258: Performed by: EMERGENCY MEDICINE

## 2019-02-19 PROCEDURE — 87086 URINE CULTURE/COLONY COUNT: CPT

## 2019-02-19 PROCEDURE — 93005 ELECTROCARDIOGRAM TRACING: CPT

## 2019-02-19 PROCEDURE — 94640 AIRWAY INHALATION TREATMENT: CPT

## 2019-02-19 PROCEDURE — 74011250637 HC RX REV CODE- 250/637: Performed by: EMERGENCY MEDICINE

## 2019-02-19 PROCEDURE — 96374 THER/PROPH/DIAG INJ IV PUSH: CPT

## 2019-02-19 PROCEDURE — 96375 TX/PRO/DX INJ NEW DRUG ADDON: CPT

## 2019-02-19 PROCEDURE — 65660000000 HC RM CCU STEPDOWN

## 2019-02-19 PROCEDURE — 81001 URINALYSIS AUTO W/SCOPE: CPT

## 2019-02-19 PROCEDURE — 96365 THER/PROPH/DIAG IV INF INIT: CPT

## 2019-02-19 PROCEDURE — 83605 ASSAY OF LACTIC ACID: CPT

## 2019-02-19 PROCEDURE — 87040 BLOOD CULTURE FOR BACTERIA: CPT

## 2019-02-19 RX ORDER — SODIUM CHLORIDE 9 MG/ML
125 INJECTION, SOLUTION INTRAVENOUS CONTINUOUS
Status: DISCONTINUED | OUTPATIENT
Start: 2019-02-19 | End: 2019-02-20

## 2019-02-19 RX ORDER — TIZANIDINE 4 MG/1
4 TABLET ORAL
COMMUNITY
End: 2019-02-21

## 2019-02-19 RX ORDER — ONDANSETRON 2 MG/ML
4 INJECTION INTRAMUSCULAR; INTRAVENOUS
Status: DISCONTINUED | OUTPATIENT
Start: 2019-02-19 | End: 2019-02-21 | Stop reason: HOSPADM

## 2019-02-19 RX ORDER — SODIUM CHLORIDE 0.9 % (FLUSH) 0.9 %
5-10 SYRINGE (ML) INJECTION AS NEEDED
Status: DISCONTINUED | OUTPATIENT
Start: 2019-02-19 | End: 2019-02-21 | Stop reason: HOSPADM

## 2019-02-19 RX ORDER — VANCOMYCIN/0.9 % SOD CHLORIDE 1 G/100 ML
1000 PLASTIC BAG, INJECTION (ML) INTRAVENOUS
Status: DISCONTINUED | OUTPATIENT
Start: 2019-02-19 | End: 2019-02-19

## 2019-02-19 RX ORDER — MORPHINE SULFATE 4 MG/ML
4 INJECTION INTRAVENOUS
Status: COMPLETED | OUTPATIENT
Start: 2019-02-19 | End: 2019-02-19

## 2019-02-19 RX ORDER — OXYCODONE AND ACETAMINOPHEN 5; 325 MG/1; MG/1
1 TABLET ORAL
Status: DISCONTINUED | OUTPATIENT
Start: 2019-02-19 | End: 2019-02-20

## 2019-02-19 RX ORDER — VANCOMYCIN/0.9 % SOD CHLORIDE 1.5G/250ML
1500 PLASTIC BAG, INJECTION (ML) INTRAVENOUS
Status: COMPLETED | OUTPATIENT
Start: 2019-02-19 | End: 2019-02-19

## 2019-02-19 RX ORDER — ALBUTEROL SULFATE 0.83 MG/ML
2.5 SOLUTION RESPIRATORY (INHALATION)
Status: COMPLETED | OUTPATIENT
Start: 2019-02-19 | End: 2019-02-19

## 2019-02-19 RX ORDER — POTASSIUM CHLORIDE 20 MEQ/1
40 TABLET, EXTENDED RELEASE ORAL
Status: COMPLETED | OUTPATIENT
Start: 2019-02-19 | End: 2019-02-19

## 2019-02-19 RX ORDER — ONDANSETRON 2 MG/ML
4 INJECTION INTRAMUSCULAR; INTRAVENOUS
Status: COMPLETED | OUTPATIENT
Start: 2019-02-19 | End: 2019-02-19

## 2019-02-19 RX ADMIN — POTASSIUM CHLORIDE 40 MEQ: 20 TABLET, EXTENDED RELEASE ORAL at 14:11

## 2019-02-19 RX ADMIN — MORPHINE SULFATE 4 MG: 4 INJECTION INTRAVENOUS at 12:23

## 2019-02-19 RX ADMIN — SODIUM CHLORIDE 1000 ML: 900 INJECTION, SOLUTION INTRAVENOUS at 12:23

## 2019-02-19 RX ADMIN — ALBUTEROL SULFATE 2.5 MG: 2.5 SOLUTION RESPIRATORY (INHALATION) at 14:11

## 2019-02-19 RX ADMIN — VANCOMYCIN HYDROCHLORIDE 1500 MG: 10 INJECTION, POWDER, LYOPHILIZED, FOR SOLUTION INTRAVENOUS at 15:12

## 2019-02-19 RX ADMIN — SODIUM CHLORIDE 125 ML/HR: 900 INJECTION, SOLUTION INTRAVENOUS at 14:17

## 2019-02-19 RX ADMIN — IOPAMIDOL 100 ML: 612 INJECTION, SOLUTION INTRAVENOUS at 13:45

## 2019-02-19 RX ADMIN — SODIUM CHLORIDE 1000 ML: 900 INJECTION, SOLUTION INTRAVENOUS at 15:03

## 2019-02-19 RX ADMIN — ONDANSETRON 4 MG: 2 INJECTION INTRAMUSCULAR; INTRAVENOUS at 12:23

## 2019-02-19 RX ADMIN — PIPERACILLIN SODIUM,TAZOBACTAM SODIUM 3.38 G: 3; .375 INJECTION, POWDER, FOR SOLUTION INTRAVENOUS at 14:11

## 2019-02-19 RX ADMIN — PIPERACILLIN SODIUM AND TAZOBACTAM SODIUM 3.38 G: 3; .375 INJECTION, POWDER, LYOPHILIZED, FOR SOLUTION INTRAVENOUS at 20:43

## 2019-02-19 NOTE — H&P
History and Physical 
NAME:  Ruthann Cardenas :   1950 MRN:   [id number] Date/Time:  2019 Chief Complaint: nausea, vomiting History of Present Illness: Ms. Abraham Vernon is a 77 y.o.   female with a PMH of neuropathy, depression who presents with c/c of nausea, vomiting and diarrhea. Patient recently returned from [facility]. She denies sick contact. She started having nausea, vomiting and diarrhea since Friday. She also has cough for about 10 days now. 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 She also had fever of 102.2. She denies chest pain or shortness of breathing. She also denies urinary complaint. Here in ED she has low grade temp. She was suspected to have UTI and started on IV antibiotics. Stool for Cdiff and culture was also send. Past Medical History:  
Diagnosis Date  Cancer ([facility] [address] SKIN  
 Neuropathy  Psychiatric disorder DEPRESSION  
 Sacroiliitis ([facility] [address] Past Surgical History:  
Procedure Laterality Date  ABDOMEN SURGERY PROC UNLISTED    
 GASTROPLASTY  BREAST SURGERY PROCEDURE UNLISTED MASTOPLASTY  HX ABDOMINOPLASTY  HX BACK SURGERY    
 HX CHOLECYSTECTOMY  HX HEENT    
 BLEPHAROPLASTY  HX HEENT    
 FOREHEAD LIFT  
 HX HYSTERECTOMY  HX KNEE ARTHROSCOPY    
 HX OTHER SURGICAL EXC OF MELANOMA  HX TONSILLECTOMY Social History Tobacco Use  Smoking status: Never Smoker Substance Use Topics  Alcohol use: No  
  
 
Family History Problem Relation Age of Onset  Diabetes Mother  Heart Disease Mother No Known Allergies Prior to Admission medications Medication Sig Start Date End Date Taking? Authorizing Provider  
oxyCODONE-acetaminophen (PERCOCET) 5-325 mg per tablet Take 1-2 Tabs by mouth every six (6) hours as needed for Pain. Max Daily Amount: 8 Tabs.  12/3/16   Teresita Beard PA-C  
 cyclobenzaprine (FLEXERIL) 10 mg tablet Take 1 Tab by mouth three (3) times daily as needed for Muscle Spasm(s). 12/3/16   Mehul CAUSEY PA-C  
diazePAM (VALIUM) 5 mg tablet Take 5 mg by mouth every six (6) hours as needed for Anxiety. Provider, Historical  
calcium-cholecalciferol, d3, (CALCIUM 600 + D) 600-125 mg-unit tab Take 1 Tab by mouth daily. Provider, Historical  
PYRIDOXINE HCL (VITAMIN B-6 PO) Take 1 Tab by mouth daily. Provider, Historical  
gabapentin (NEURONTIN) 300 mg capsule TAKE 1 CAPSULE BY MOUTH EVERY MORNING, 2 CAPSULES EVERY AFTERNOON, AND 3 CAPSULES AT NIGHT 9/6/16   Provider, Historical  
HYDROcodone-acetaminophen (NORCO)  mg tablet TAKE 1 TABLET BY MOUTH EVERY 8 HOURS AS NEEDED 9/6/16   Provider, Historical  
citalopram (CELEXA) 40 mg tablet Take 40 mg by mouth daily. Provider, Historical  
melatonin tab tablet Take  by mouth nightly. Provider, Historical  
alendronate (FOSAMAX) 70 mg tablet Take 70 mg by mouth every seven (7) days. Provider, Historical  
 
 
  
Review of systems:  
 
CONSTITUTIONAL:  No weight loss, No Night sweats HEENT:  No epistaxis, No diff in swallowing CVS: No chest pain, No palpitations, No syncope, No peripheral edema, No PND, No orthopnea RS: No shortness of breath, No cough, No hemoptysis, No pleuritic chest pain GI:  No hematemesis, No rectal bleeding, No acid reflux or heartburn NEURO: No focal weakness, No headaches, No seizures PSYCH: No anxiety, No depression MUSCULOSKLETAL: No joint pain or swelling : No hematuria or dysuria SKIN: No rash Physical Exam: VITALS:   
Vital signs reviewed; most recent are: 
 
Visit Vitals /72 (BP 1 Location: Right arm, BP Patient Position: Supine) Pulse 83 Temp 98.5 °F (36.9 °C) Resp 16 Ht 5' 5.98\" (1.676 m) Wt 77.1 kg (170 lb) SpO2 100% BMI 27.45 kg/m² SpO2 Readings from Last 6 Encounters:  
02/19/19 100% 12/03/16 100% O2 Flow Rate (L/min): 2 l/min Intake/Output Summary (Last 24 hours) at 2/19/2019 1843 Last data filed at 2/19/2019 1704 Gross per 24 hour Intake  Output 550 ml Net -550 ml GENERAL: Not in acute distress HEENT: pink conjunctiva, un icteric sclera, NECK: No lymphadenopthy or thyroid swelling, JVD not seen LYMPH: No supraclavicular or cervical or axillary nodes on both sides CVS: S1S2, No murmurs, No gallop or rub RS: CTA, No wheezing or crackles Abd: Soft, non tender, not distended, No guarding, No rigidity NEURO:  No focal neurologic deficits Extrm: no leg edema or swelling Skin: No rash Labs:  
 
Recent Results (from the past 24 hour(s)) CBC WITH AUTOMATED DIFF Collection Time: 02/19/19 12:10 PM  
Result Value Ref Range WBC 9.4 4.6 - 13.2 K/uL  
 RBC 4.34 4.20 - 5.30 M/uL  
 HGB 13.3 12.0 - 16.0 g/dL HCT 39.9 35.0 - 45.0 % MCV 91.9 74.0 - 97.0 FL  
 MCH 30.6 24.0 - 34.0 PG  
 MCHC 33.3 31.0 - 37.0 g/dL  
 RDW 12.8 11.6 - 14.5 % PLATELET 228 748 - 457 K/uL MPV 10.9 9.2 - 11.8 FL  
 NEUTROPHILS 63 40 - 73 % LYMPHOCYTES 27 21 - 52 % MONOCYTES 9 3 - 10 % EOSINOPHILS 1 0 - 5 % BASOPHILS 0 0 - 2 %  
 ABS. NEUTROPHILS 6.0 1.8 - 8.0 K/UL  
 ABS. LYMPHOCYTES 2.5 0.9 - 3.6 K/UL  
 ABS. MONOCYTES 0.8 0.05 - 1.2 K/UL  
 ABS. EOSINOPHILS 0.1 0.0 - 0.4 K/UL  
 ABS. BASOPHILS 0.0 0.0 - 0.1 K/UL  
 DF AUTOMATED METABOLIC PANEL, COMPREHENSIVE Collection Time: 02/19/19 12:10 PM  
Result Value Ref Range Sodium 137 136 - 145 mmol/L Potassium 3.0 (L) 3.5 - 5.5 mmol/L Chloride 103 100 - 108 mmol/L  
 CO2 25 21 - 32 mmol/L Anion gap 9 3.0 - 18 mmol/L Glucose 101 (H) 74 - 99 mg/dL BUN 10 7.0 - 18 MG/DL Creatinine 0.93 0.6 - 1.3 MG/DL  
 BUN/Creatinine ratio 11 (L) 12 - 20 GFR est AA >60 >60 ml/min/1.73m2 GFR est non-AA 60 (L) >60 ml/min/1.73m2 Calcium 9.0 8.5 - 10.1 MG/DL  Bilirubin, total 0.8 0.2 - 1.0 MG/DL  
 ALT (SGPT) 13 13 - 56 U/L  
 AST (SGOT) 14 (L) 15 - 37 U/L Alk. phosphatase 105 45 - 117 U/L Protein, total 7.6 6.4 - 8.2 g/dL Albumin 3.6 3.4 - 5.0 g/dL Globulin 4.0 2.0 - 4.0 g/dL A-G Ratio 0.9 0.8 - 1.7 MAGNESIUM Collection Time: 02/19/19 12:10 PM  
Result Value Ref Range Magnesium 2.0 1.6 - 2.6 mg/dL POC LACTIC ACID Collection Time: 02/19/19 12:16 PM  
Result Value Ref Range Lactic Acid (POC) 3.72 (HH) 0.40 - 2.00 mmol/L  
EKG, 12 LEAD, INITIAL Collection Time: 02/19/19  2:18 PM  
Result Value Ref Range Ventricular Rate 77 BPM  
 Atrial Rate 77 BPM  
 P-R Interval 144 ms QRS Duration 76 ms  
 Q-T Interval 370 ms QTC Calculation (Bezet) 418 ms Calculated P Axis -6 degrees Calculated R Axis 23 degrees Calculated T Axis 24 degrees Diagnosis Sinus rhythm with premature atrial complexes Nonspecific T wave abnormality Abnormal ECG No previous ECGs available Confirmed by Sofía Caro MD, Shankar Looney ([id number]) on 2/19/2019 3:50:52 PM 
  
URINALYSIS W/ RFLX MICROSCOPIC Collection Time: 02/19/19  2:25 PM  
Result Value Ref Range Color YELLOW Appearance CLEAR Specific gravity 1.015 1.005 - 1.030    
 pH (UA) 7.0 5.0 - 8.0 Protein NEGATIVE  NEG mg/dL Glucose NEGATIVE  NEG mg/dL Ketone 15 (A) NEG mg/dL Bilirubin NEGATIVE  NEG Blood NEGATIVE  NEG Urobilinogen 0.2 0.2 - 1.0 EU/dL Nitrites POSITIVE (A) NEG Leukocyte Esterase MODERATE (A) NEG URINE MICROSCOPIC ONLY Collection Time: 02/19/19  2:25 PM  
Result Value Ref Range WBC 1 to 3 0 - 4 /hpf  
 RBC NONE 0 - 5 /hpf Epithelial cells FEW 0 - 5 /lpf Bacteria 4+ (A) NEG /hpf POC LACTIC ACID Collection Time: 02/19/19  4:45 PM  
Result Value Ref Range Lactic Acid (POC) 3.77 (HH) 0.40 - 2.00 mmol/L Active Problems: 
  Cough (2/19/2019) Diarrhea (2/19/2019) Hypokalemia (2/19/2019) Lactic acidosis (2/19/2019) Vomiting (2/19/2019) Recent foreign travel (2/19/2019) Assessment:  
 
 
1. Nausea, vomiting and diarrhea 2. Suspect gastroenteritis, probably viral 
3. UTI 4. Acute bronchitis 5. Hypokalemia 6. Dehydration 7. Neuropathy,  
8. Depression Plan:  
 
 
· Patient being admitted · Continue IV antibiotics with zosyn for now as started in ED and de-escalate based on cultures · IVF hydration · Stool for Cdiff and culture · Replete electrolytes · Resume home meds · Full code · DVT prophylaxsis Total time:  58 minutes 
     
_______________________________________________________________________ Attending Physician:  Antony John MD  
 
 
Copies: Lieutenant Matt MD

## 2019-02-19 NOTE — ED NOTES
I performed a brief evaluation, including history and physical, of the patient here in triage and I have determined that pt will need further treatment and evaluation from the main side ER physician. I have placed initial orders to help in expediting patients care. February 19, 2019 at 12:00 PM - JAVIER Henry Visit Vitals BP (!) 152/92 (BP 1 Location: Left arm, BP Patient Position: At rest) Pulse 96 Temp 98.8 °F (37.1 °C) Resp 16 Wt 77.1 kg (170 lb) SpO2 100% BMI 27.44 kg/m²

## 2019-02-19 NOTE — ED PROVIDER NOTES
EMERGENCY DEPARTMENT HISTORY AND PHYSICAL EXAM 
 
12:54 PM 
 
 
Date: 2/19/2019 Patient Name: Jaimie Lu History of Presenting Illness Chief Complaint Patient presents with  Nausea  Vomiting  Diarrhea  Abdominal Pain History Provided By: Patient Additional History (Context): Jaimie Lu is a 77 y.o. female with sacroiliitis, CA, neuropathy who presents with N/V/D since Friday. Pt was on international travel when she came back with a persistent nonproductive cough. 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 On Friday after school, pt says that she had an abd pain followed by vomiting and persistent diarrhea as well as numbness on her toes and fingertips. Last vomiting was last night and diarrhea PTA, per pt. She is also c/o groin pain that shoots down her left leg after administering morphine. Pt denies hx of diabetes. No other concerns or symptoms at this time. PCP: Yazmin Tenorio MD 
 
Chief Complaint: N/V/D Duration:  since Friday Timing:  Worsening Location: GI 
Quality: None reported Severity: N/A Modifying Factors: none reported Associated Symptoms: abd pain, groin pain, LLE pain, numbness on all fingers and toes, chills, cough Past History Past Medical History: 
Past Medical History:  
Diagnosis Date  Cancer ([facility] [address] SKIN  
 Neuropathy  Psychiatric disorder DEPRESSION  
 Sacroiliitis ([facility] [address] Past Surgical History: 
Past Surgical History:  
Procedure Laterality Date  ABDOMEN SURGERY PROC UNLISTED    
 GASTROPLASTY  BREAST SURGERY PROCEDURE UNLISTED MASTOPLASTY  HX ABDOMINOPLASTY  HX BACK SURGERY    
 HX CHOLECYSTECTOMY  HX HEENT    
 BLEPHAROPLASTY  HX HEENT    
 FOREHEAD LIFT  
 HX HYSTERECTOMY  HX KNEE ARTHROSCOPY    
 HX OTHER SURGICAL EXC OF MELANOMA  HX TONSILLECTOMY Family History: 
Family History Problem Relation Age of Onset  Diabetes Mother  Heart Disease Mother Social History: 
Social History Tobacco Use  Smoking status: Never Smoker Substance Use Topics  Alcohol use: No  
 Drug use: No  
 
 
Allergies: 
No Known Allergies Review of Systems Review of Systems Constitutional: Negative for chills and fever. HENT: Negative for congestion, rhinorrhea, sore throat and trouble swallowing. Eyes: Negative for visual disturbance. Respiratory: Positive for cough ( nonproductive). Negative for shortness of breath and wheezing. Cardiovascular: Negative for chest pain. Gastrointestinal: Positive for abdominal pain, diarrhea, nausea and vomiting. Endocrine: Negative for polyuria. Genitourinary: Negative for dysuria. +Left groin pain Musculoskeletal: Negative for arthralgias, back pain and neck stiffness.  
      + LLE pain Skin: Negative for pallor and rash. Neurological: Positive for numbness (all fingers and toes). Negative for dizziness, weakness and headaches. Hematological: Does not bruise/bleed easily. Psychiatric/Behavioral: Negative for confusion and dysphoric mood. All other systems reviewed and are negative. Physical Exam  
 
Visit Vitals /65 Pulse 94 Temp 98.8 °F (37.1 °C) Resp 20 Wt 77.1 kg (170 lb) SpO2 100% BMI 27.44 kg/m² Physical Exam  
Constitutional: She is oriented to person, place, and time. She appears well-developed and well-nourished. No distress. HENT:  
Head: Normocephalic and atraumatic. Mouth/Throat: Oropharynx is clear and moist.  
Eyes: Conjunctivae are normal. Pupils are equal, round, and reactive to light. No scleral icterus. Neck: Normal range of motion. Neck supple. Cardiovascular: Normal rate and intact distal pulses. Capillary refill < 3 seconds Pulmonary/Chest: Effort normal and breath sounds normal. No respiratory distress. She has no wheezes. Abdominal: Soft. Bowel sounds are normal. She exhibits no distension. There is tenderness ( general ). Musculoskeletal: Normal range of motion. She exhibits tenderness (inguinal  that is shooting down the LLE). She exhibits no edema. Lymphadenopathy:  
  She has no cervical adenopathy. Neurological: She is alert and oriented to person, place, and time. No cranial nerve deficit. She exhibits normal muscle tone. Coordination normal.  
Strength 5/5 x 4 limbs Sensation intact Equal range of motion B/L UE and B/L LE  
Skin: Skin is warm and dry. She is not diaphoretic. Psychiatric: Her behavior is normal.  
Nursing note and vitals reviewed. Diagnostic Study Results Labs - Recent Results (from the past 12 hour(s)) CBC WITH AUTOMATED DIFF Collection Time: 02/19/19 12:10 PM  
Result Value Ref Range WBC 9.4 4.6 - 13.2 K/uL  
 RBC 4.34 4.20 - 5.30 M/uL  
 HGB 13.3 12.0 - 16.0 g/dL HCT 39.9 35.0 - 45.0 % MCV 91.9 74.0 - 97.0 FL  
 MCH 30.6 24.0 - 34.0 PG  
 MCHC 33.3 31.0 - 37.0 g/dL  
 RDW 12.8 11.6 - 14.5 % PLATELET 985 621 - 916 K/uL MPV 10.9 9.2 - 11.8 FL  
 NEUTROPHILS 63 40 - 73 % LYMPHOCYTES 27 21 - 52 % MONOCYTES 9 3 - 10 % EOSINOPHILS 1 0 - 5 % BASOPHILS 0 0 - 2 %  
 ABS. NEUTROPHILS 6.0 1.8 - 8.0 K/UL  
 ABS. LYMPHOCYTES 2.5 0.9 - 3.6 K/UL  
 ABS. MONOCYTES 0.8 0.05 - 1.2 K/UL  
 ABS. EOSINOPHILS 0.1 0.0 - 0.4 K/UL  
 ABS. BASOPHILS 0.0 0.0 - 0.1 K/UL  
 DF AUTOMATED METABOLIC PANEL, COMPREHENSIVE Collection Time: 02/19/19 12:10 PM  
Result Value Ref Range Sodium 137 136 - 145 mmol/L Potassium 3.0 (L) 3.5 - 5.5 mmol/L Chloride 103 100 - 108 mmol/L  
 CO2 25 21 - 32 mmol/L Anion gap 9 3.0 - 18 mmol/L Glucose 101 (H) 74 - 99 mg/dL BUN 10 7.0 - 18 MG/DL  Creatinine 0.93 0.6 - 1.3 MG/DL  
 BUN/Creatinine ratio 11 (L) 12 - 20    
 GFR est AA >60 >60 ml/min/1.73m2 GFR est non-AA 60 (L) >60 ml/min/1.73m2 Calcium 9.0 8.5 - 10.1 MG/DL Bilirubin, total 0.8 0.2 - 1.0 MG/DL  
 ALT (SGPT) 13 13 - 56 U/L  
 AST (SGOT) 14 (L) 15 - 37 U/L Alk. phosphatase 105 45 - 117 U/L Protein, total 7.6 6.4 - 8.2 g/dL Albumin 3.6 3.4 - 5.0 g/dL Globulin 4.0 2.0 - 4.0 g/dL A-G Ratio 0.9 0.8 - 1.7 MAGNESIUM Collection Time: 02/19/19 12:10 PM  
Result Value Ref Range Magnesium 2.0 1.6 - 2.6 mg/dL POC LACTIC ACID Collection Time: 02/19/19 12:16 PM  
Result Value Ref Range Lactic Acid (POC) 3.72 (HH) 0.40 - 2.00 mmol/L  
EKG, 12 LEAD, INITIAL Collection Time: 02/19/19  2:18 PM  
Result Value Ref Range Ventricular Rate 77 BPM  
 Atrial Rate 77 BPM  
 P-R Interval 144 ms QRS Duration 76 ms  
 Q-T Interval 370 ms QTC Calculation (Bezet) 418 ms Calculated P Axis -6 degrees Calculated R Axis 23 degrees Calculated T Axis 24 degrees Diagnosis Sinus rhythm with premature atrial complexes Nonspecific T wave abnormality Abnormal ECG No previous ECGs available Confirmed by Alcides Rosado MD, nAna Hernandez ([id number]) on 2/19/2019 3:50:52 PM 
  
URINALYSIS W/ RFLX MICROSCOPIC Collection Time: 02/19/19  2:25 PM  
Result Value Ref Range Color YELLOW Appearance CLEAR Specific gravity 1.015 1.005 - 1.030    
 pH (UA) 7.0 5.0 - 8.0 Protein NEGATIVE  NEG mg/dL Glucose NEGATIVE  NEG mg/dL Ketone 15 (A) NEG mg/dL Bilirubin NEGATIVE  NEG Blood NEGATIVE  NEG Urobilinogen 0.2 0.2 - 1.0 EU/dL Nitrites POSITIVE (A) NEG Leukocyte Esterase MODERATE (A) NEG URINE MICROSCOPIC ONLY Collection Time: 02/19/19  2:25 PM  
Result Value Ref Range WBC 1 to 3 0 - 4 /hpf  
 RBC NONE 0 - 5 /hpf Epithelial cells FEW 0 - 5 /lpf Bacteria 4+ (A) NEG /hpf Radiologic Studies -  
XR CHEST PORT Final Result IMPRESSION: No acute cardiopulmonary process. CT ABD PELV W CONT Final Result IMPRESSION:  
  
Small volume fluid within the right colon and sigmoid colon which may correspond  
with patient's history of diarrhea. The colon otherwise appears within normal  
limits. Dilated common bile duct and intrahepatic duct system which may be related to  
cholecystectomy reservoir effect. Gastric bypass. Hysterectomy. 2 subcentimeter hypodensities of the left kidney too small to further  
characterize but favored to be small cysts. Spinal instrumentation with stable compression fracture of L3. There are  
nonspecific pedicular screw lucencies at L2 Medical Decision Making I am the first provider for this patient. I reviewed the vital signs, available nursing notes, past medical history, past surgical history, family history and social history. Vital Signs-Reviewed the patient's vital signs. Pulse Oximetry Analysis -  100% on room air (Interpretation)WNL EKG interpreted by JOSHUA Saeed,  Time: 0916 Rate: 77 Rhythm: SR Interpretation: no ST elevation, no ST depression Records Reviewed: Nursing Notes (Time of Review: 12:54 PM) Provider Notes (Medical Decision Making): MDM Number of Diagnoses or Management Options Diagnosis management comments: DDx: infectious diarrhea, gastritis, diverticulitis disease, biliary, other infection such as pneumonia, pt recently back on a trip from [facility], Check labs, IV abx, check stool, CXR, EKG Medications  
0.9% sodium chloride infusion (125 mL/hr IntraVENous New Bag 2/19/19 1417)  
sodium chloride (NS) flush 5-10 mL (not administered)  
vancomycin (VANCOCIN) 1500 mg in  ml infusion (1,500 mg IntraVENous New Bag 2/19/19 1512)  
sodium chloride 0.9 % bolus infusion 1,000 mL (1,000 mL IntraVENous New Bag 2/19/19 1223) ondansetron [facility]) injection 4 mg (4 mg IntraVENous Given 2/19/19 1223) morphine injection 4 mg (4 mg IntraVENous Given 2/19/19 1223) piperacillin-tazobactam (ZOSYN) 3.375 g in 0.9% sodium chloride (MBP/ADV) 100 mL MBP (3.375 g IntraVENous New Bag 2/19/19 1411)  
albuterol (PROVENTIL VENTOLIN) nebulizer solution 2.5 mg (2.5 mg Nebulization Given 2/19/19 1411) potassium chloride (K-DUR, KLOR-CON) SR tablet 40 mEq (40 mEq Oral Given 2/19/19 1411) iopamidol (ISOVUE 300) 61 % contrast injection  mL (100 mL IntraVENous Given 2/19/19 1345)  
sodium chloride 0.9 % bolus infusion 1,000 mL (1,000 mL IntraVENous New Bag 2/19/19 1503) ED Course: Progress Notes, Reevaluation, and Consults: 
 
K 3.0, repleted with PO 
LA elevated, likely from fluid depletion N/V/D Does have UTI Afebrile, WBC wnl Consult:  Discussed care with Dr. Norman Pagan, Specialty: Hospitalist  Standard discussion; including history of patients chief complaint, available diagnostic results, and treatment course. Agrees to admit.  
4:00 PM, 2/19/2019 For Hospitalized Patients: 
 
1. Hospitalization Decision Time: The decision to hospitalize the patient was made by Dr. Herbert Gutierres at 4:00 PM on 2/19/2019 2. Aspirin: Aspirin was not given because the patient did not present with a stroke at the time of their Emergency Department evaluation Diagnosis Clinical Impression: 1. Cough 2. Diarrhea, unspecified type 3. Hypokalemia 4. Lactic acid acidosis 5. Non-intractable vomiting with nausea, unspecified vomiting type 6. Urinary tract infection without hematuria, site unspecified Disposition: Admit Follow-up Information None Medication List  
  
ASK your doctor about these medications CALCIUM 600 + D 600-125 mg-unit Tab Generic drug:  calcium-cholecalciferol (d3) CeleXA 40 mg tablet Generic drug:  citalopram 
  
cyclobenzaprine 10 mg tablet Commonly known as:  FLEXERIL Take 1 Tab by mouth three (3) times daily as needed for Muscle Spasm(s). FOSAMAX 70 mg tablet Generic drug:  alendronate gabapentin 300 mg capsule Commonly known as:  NEURONTIN HYDROcodone-acetaminophen  mg tablet Commonly known as:  NORCO 
  
melatonin Tab tablet 
  
oxyCODONE-acetaminophen 5-325 mg per tablet Commonly known as:  PERCOCET Take 1-2 Tabs by mouth every six (6) hours as needed for Pain. Max Daily Amount: 8 Tabs. VALIUM 5 mg tablet Generic drug:  diazePAM 
  
VITAMIN B-6 PO 
  
  
 
_______________________________ Attestations: 
Scribe Attestation [address] [place] [address] acting as a scribe for and in the presence of Norm CALL DO February 19, 2019 at 1:59 PM 
    
Provider Attestation:     
I personally performed the services described in the documentation, reviewed the documentation, as recorded by the scribe in my presence, and it accurately and completely records my words and actions. February 19, 2019 at 1:59 PM - Bindu Fountain DO   
_______________________________

## 2019-02-19 NOTE — ED TRIAGE NOTES
Patient arrived from home c/o nausea vomiting diarrhea abdominal pain x2 wks. Patient states diarrhea smells like \"c-diff\". Patient has had international travel.

## 2019-02-19 NOTE — ED NOTES
TRANSFER - OUT REPORT: 
 
Verbal report given to Becky(name) on Maddie Pierre  being transferred to (unit) for routine progression of care Report consisted of patients Situation, Background, Assessment and  
Recommendations(SBAR). Information from the following report(s) SBAR, Kardex and ED Summary was reviewed with the receiving nurse. Lines:  
Peripheral IV 02/19/19 Left Antecubital (Active) Site Assessment Clean, dry, & intact 2/19/2019 12:10 PM  
Phlebitis Assessment 0 2/19/2019 12:10 PM  
Infiltration Assessment 0 2/19/2019 12:10 PM  
Dressing Status Clean, dry, & intact 2/19/2019 12:10 PM  
Dressing Type 4 X 4;Tape;Transparent 2/19/2019 12:10 PM  
Hub Color/Line Status Pink;Flushed;Patent 2/19/2019 12:10 PM  
Action Taken Blood drawn 2/19/2019 12:10 PM  
Alcohol Cap Used No 2/19/2019 12:10 PM  
  
 
Opportunity for questions and clarification was provided. Patient transported with: 
 [employer]

## 2019-02-20 LAB
ALBUMIN SERPL-MCNC: 2.7 G/DL (ref 3.4–5)
ALBUMIN/GLOB SERPL: 0.8 {RATIO} (ref 0.8–1.7)
ALP SERPL-CCNC: 77 U/L (ref 45–117)
ALT SERPL-CCNC: 11 U/L (ref 13–56)
ANION GAP SERPL CALC-SCNC: 7 MMOL/L (ref 3–18)
AST SERPL-CCNC: 11 U/L (ref 15–37)
BASOPHILS # BLD: 0 K/UL (ref 0–0.1)
BASOPHILS NFR BLD: 0 % (ref 0–2)
BILIRUB SERPL-MCNC: 0.7 MG/DL (ref 0.2–1)
BUN SERPL-MCNC: 7 MG/DL (ref 7–18)
BUN/CREAT SERPL: 10 (ref 12–20)
C DIFF GDH STL QL: NEGATIVE
C DIFF TOX A+B STL QL IA: NEGATIVE
CALCIUM SERPL-MCNC: 7.5 MG/DL (ref 8.5–10.1)
CHLORIDE SERPL-SCNC: 112 MMOL/L (ref 100–108)
CO2 SERPL-SCNC: 22 MMOL/L (ref 21–32)
CREAT SERPL-MCNC: 0.73 MG/DL (ref 0.6–1.3)
DIFFERENTIAL METHOD BLD: ABNORMAL
EOSINOPHIL # BLD: 0.2 K/UL (ref 0–0.4)
EOSINOPHIL NFR BLD: 3 % (ref 0–5)
ERYTHROCYTE [DISTWIDTH] IN BLOOD BY AUTOMATED COUNT: 13.1 % (ref 11.6–14.5)
FLUAV AG NPH QL IA: NEGATIVE
FLUBV AG NOSE QL IA: NEGATIVE
GLOBULIN SER CALC-MCNC: 3.2 G/DL (ref 2–4)
GLUCOSE BLD STRIP.AUTO-MCNC: 106 MG/DL (ref 70–110)
GLUCOSE BLD STRIP.AUTO-MCNC: 116 MG/DL (ref 70–110)
GLUCOSE SERPL-MCNC: 101 MG/DL (ref 74–99)
HCT VFR BLD AUTO: 33 % (ref 35–45)
HGB BLD-MCNC: 10.7 G/DL (ref 12–16)
INTERPRETATION: NORMAL
LYMPHOCYTES # BLD: 1.7 K/UL (ref 0.9–3.6)
LYMPHOCYTES NFR BLD: 25 % (ref 21–52)
MCH RBC QN AUTO: 30.1 PG (ref 24–34)
MCHC RBC AUTO-ENTMCNC: 32.4 G/DL (ref 31–37)
MCV RBC AUTO: 92.7 FL (ref 74–97)
MONOCYTES # BLD: 0.8 K/UL (ref 0.05–1.2)
MONOCYTES NFR BLD: 12 % (ref 3–10)
NEUTS SEG # BLD: 4.1 K/UL (ref 1.8–8)
NEUTS SEG NFR BLD: 60 % (ref 40–73)
PLATELET # BLD AUTO: 253 K/UL (ref 135–420)
PMV BLD AUTO: 10.6 FL (ref 9.2–11.8)
POTASSIUM SERPL-SCNC: 3.3 MMOL/L (ref 3.5–5.5)
PROT SERPL-MCNC: 5.9 G/DL (ref 6.4–8.2)
RBC # BLD AUTO: 3.56 M/UL (ref 4.2–5.3)
SODIUM SERPL-SCNC: 141 MMOL/L (ref 136–145)
WBC # BLD AUTO: 6.8 K/UL (ref 4.6–13.2)
WBC #/AREA STL HPF: NORMAL /HPF

## 2019-02-20 PROCEDURE — 85025 COMPLETE CBC W/AUTO DIFF WBC: CPT

## 2019-02-20 PROCEDURE — 74011250636 HC RX REV CODE- 250/636: Performed by: INTERNAL MEDICINE

## 2019-02-20 PROCEDURE — 74011250637 HC RX REV CODE- 250/637: Performed by: INTERNAL MEDICINE

## 2019-02-20 PROCEDURE — 87449 NOS EACH ORGANISM AG IA: CPT

## 2019-02-20 PROCEDURE — 65660000000 HC RM CCU STEPDOWN

## 2019-02-20 PROCEDURE — 74011250636 HC RX REV CODE- 250/636

## 2019-02-20 PROCEDURE — 74011000258 HC RX REV CODE- 258

## 2019-02-20 PROCEDURE — 80053 COMPREHEN METABOLIC PANEL: CPT

## 2019-02-20 PROCEDURE — 82962 GLUCOSE BLOOD TEST: CPT

## 2019-02-20 PROCEDURE — 89055 LEUKOCYTE ASSESSMENT FECAL: CPT

## 2019-02-20 PROCEDURE — 36415 COLL VENOUS BLD VENIPUNCTURE: CPT

## 2019-02-20 PROCEDURE — 74011000258 HC RX REV CODE- 258: Performed by: INTERNAL MEDICINE

## 2019-02-20 PROCEDURE — 87045 FECES CULTURE AEROBIC BACT: CPT

## 2019-02-20 PROCEDURE — 87804 INFLUENZA ASSAY W/OPTIC: CPT

## 2019-02-20 PROCEDURE — 87633 RESP VIRUS 12-25 TARGETS: CPT

## 2019-02-20 PROCEDURE — 74011250637 HC RX REV CODE- 250/637: Performed by: NURSE PRACTITIONER

## 2019-02-20 RX ORDER — CALCIUM CARBONATE/VITAMIN D3 600MG-5MCG
1 TABLET ORAL DAILY
Status: DISCONTINUED | OUTPATIENT
Start: 2019-02-21 | End: 2019-02-21 | Stop reason: HOSPADM

## 2019-02-20 RX ORDER — POTASSIUM CHLORIDE 20 MEQ/1
40 TABLET, EXTENDED RELEASE ORAL 2 TIMES DAILY
Status: COMPLETED | OUTPATIENT
Start: 2019-02-20 | End: 2019-02-20

## 2019-02-20 RX ORDER — GABAPENTIN 300 MG/1
300 CAPSULE ORAL 3 TIMES DAILY
Status: DISCONTINUED | OUTPATIENT
Start: 2019-02-20 | End: 2019-02-21 | Stop reason: HOSPADM

## 2019-02-20 RX ORDER — CHOLECALCIFEROL (VITAMIN D3) 125 MCG
5 CAPSULE ORAL
Status: DISCONTINUED | OUTPATIENT
Start: 2019-02-20 | End: 2019-02-21 | Stop reason: HOSPADM

## 2019-02-20 RX ORDER — FAMOTIDINE 20 MG/1
20 TABLET, FILM COATED ORAL 2 TIMES DAILY
Status: DISCONTINUED | OUTPATIENT
Start: 2019-02-20 | End: 2019-02-21 | Stop reason: HOSPADM

## 2019-02-20 RX ORDER — UREA 10 %
2 LOTION (ML) TOPICAL 2 TIMES DAILY
Status: DISCONTINUED | OUTPATIENT
Start: 2019-02-20 | End: 2019-02-21 | Stop reason: HOSPADM

## 2019-02-20 RX ORDER — ACETAMINOPHEN 325 MG/1
650 TABLET ORAL
Status: DISCONTINUED | OUTPATIENT
Start: 2019-02-20 | End: 2019-02-21 | Stop reason: HOSPADM

## 2019-02-20 RX ORDER — CITALOPRAM 20 MG/1
20 TABLET, FILM COATED ORAL DAILY
Status: DISCONTINUED | OUTPATIENT
Start: 2019-02-21 | End: 2019-02-21 | Stop reason: HOSPADM

## 2019-02-20 RX ADMIN — ACETAMINOPHEN 650 MG: 325 TABLET ORAL at 15:04

## 2019-02-20 RX ADMIN — POTASSIUM CHLORIDE 40 MEQ: 20 TABLET, EXTENDED RELEASE ORAL at 09:03

## 2019-02-20 RX ADMIN — PIPERACILLIN SODIUM AND TAZOBACTAM SODIUM 3.38 G: 3; .375 INJECTION, POWDER, LYOPHILIZED, FOR SOLUTION INTRAVENOUS at 11:29

## 2019-02-20 RX ADMIN — PIPERACILLIN SODIUM AND TAZOBACTAM SODIUM 3.38 G: 3; .375 INJECTION, POWDER, LYOPHILIZED, FOR SOLUTION INTRAVENOUS at 05:49

## 2019-02-20 RX ADMIN — FAMOTIDINE 20 MG: 20 TABLET ORAL at 17:15

## 2019-02-20 RX ADMIN — OXYCODONE AND ACETAMINOPHEN 1 TABLET: 5; 325 TABLET ORAL at 07:49

## 2019-02-20 RX ADMIN — LACTOBACILLUS TAB 2 TABLET: TAB at 17:15

## 2019-02-20 RX ADMIN — GABAPENTIN 300 MG: 300 CAPSULE ORAL at 15:04

## 2019-02-20 RX ADMIN — ONDANSETRON 4 MG: 2 INJECTION INTRAMUSCULAR; INTRAVENOUS at 07:49

## 2019-02-20 RX ADMIN — ONDANSETRON 4 MG: 2 INJECTION INTRAMUSCULAR; INTRAVENOUS at 15:04

## 2019-02-20 RX ADMIN — PIPERACILLIN SODIUM AND TAZOBACTAM SODIUM 3.38 G: 3; .375 INJECTION, POWDER, LYOPHILIZED, FOR SOLUTION INTRAVENOUS at 00:28

## 2019-02-20 RX ADMIN — Medication 5 MG: at 21:25

## 2019-02-20 RX ADMIN — PIPERACILLIN SODIUM AND TAZOBACTAM SODIUM 3.38 G: 3; .375 INJECTION, POWDER, LYOPHILIZED, FOR SOLUTION INTRAVENOUS at 17:14

## 2019-02-20 RX ADMIN — GABAPENTIN 300 MG: 300 CAPSULE ORAL at 21:25

## 2019-02-20 RX ADMIN — POTASSIUM CHLORIDE 40 MEQ: 20 TABLET, EXTENDED RELEASE ORAL at 17:15

## 2019-02-20 NOTE — PROGRESS NOTES
The patient was seen and examined independently. Please read my note for additional detail. The plan was discussed with APC. Feeling a lot better. States she has been feeling sick for more than 10 days. No chest or abdominal pain. No SOB or cough. No nausea or vomiting since she has been here. Loose BMs present. /78 (BP 1 Location: Left arm, BP Patient Position: Supine)   Pulse (!) 101   Temp 96.6 °F (35.9 °C)   Resp 19   Ht 5' 5.98\" (1.676 m)   Wt 78.9 kg (173 lb 14.4 oz)   SpO2 99%   Breastfeeding? No   BMI 28.08 kg/m² CVS: RRR 
RS: CTA Bilaterally GI: NT, BS + Extremities: no pedal edema CNS: moves all extremities well General: NAD, Awake ASSESSMENT: 
 
1. Nausea, vomiting and diarrhea, improving. Sec to # 2. ? Medication withdrawals 2. Suspect gastroenteritis, probably viral 
3. UTI 4. Hypokalemia 5. Dehydration, improved 6. Neuropathy,  
7. Depression PLAN: 
 
Cont current management CXR negative Stool c diff negative. Check Flu PCR Add probiotic Cont antibiotic until tomorrow PT/OT ordered Restart Gabapentin and Celexa

## 2019-02-20 NOTE — PROGRESS NOTES
[facility] Hospitalist Group Progress Note Patient: Neo Mireles Age: 77 y.o. : 1950 MR#: [id number] SSN: [id number] Date: 2019 Subjective:  
Alert and oriented X 3. Reporting having diarrhea, cough, n/v and intermittent fever / chills X 10 days. States symptoms improved with home remedies then worsened again in last few days. States yesterday had fever of 102. 2. No chest pain, chest pressure or discomfort. Assessment/Plan: 1. Nausea, vomiting and diarrhea, improving. Sec to # 2. ? Medication withdrawals 2. Suspect gastroenteritis, probably viral 
3. Yao Vaughan 4. Hypokalemia 5. Dehydration, improved 6. Neuropathy,  
7. Depression Plan 1. CXR with no acute pathology. CTA ABD/Pelvis with small volume fluid within the right colon and sigmoid colon which may correspond with patient's history of diarrhea; no acute pathology. Stool neg for C. Diff. No fevers, no leukocytosis. PCR flu pending. Suspect viral etiology. Cont current management with IVF. Trend and replete lytes when warranted. 2. +UA on admission. Urine cx pending. Continue abx, will adjust abx course pending culture results. 3. No SI or HI. Continue home regimen. Additional Notes:   
 
Case discussed with:  [x]Patient  [x]Family  [x]Nursing  [x]Case Management DVT Prophylaxis:  []Lovenox  [x]Hep SQ  []SCDs  []Coumadin   []On Heparin gtt Objective:  
VS:  
Visit Vitals /78 (BP 1 Location: Left arm, BP Patient Position: Supine) Pulse (!) 101 Temp 96.6 °F (35.9 °C) Resp 19 Ht 5' 5.98\" (1.676 m) Wt 78.9 kg (173 lb 14.4 oz) SpO2 99% Breastfeeding? No  
BMI 28.08 kg/m² Tmax/24hrs: Temp (24hrs), Av.5 °F (36.9 °C), Min:96.6 °F (35.9 °C), Max:99 °F (37.2 °C) Intake/Output Summary (Last 24 hours) at 2019 1511 Last data filed at 2019 3676 Gross per 24 hour Intake 1480 ml Output 4350 ml Net -2870 ml General:  Alert, NAD 
 Cardiovascular:  RRR Pulmonary:  LSC throughout; respiratory effort WNL 
GI:  +BS in all four quadrants, soft, non-tender Extremities:  No edema; 2+ dorsalis pedis pulses bilaterally Neuro: alert and oriented X 3. Labs:   
Recent Results (from the past 24 hour(s)) POC LACTIC ACID Collection Time: 02/19/19  4:45 PM  
Result Value Ref Range Lactic Acid (POC) 3.77 (HH) 0.40 - 2.00 mmol/L  
GLUCOSE, POC Collection Time: 02/19/19  9:29 PM  
Result Value Ref Range Glucose (POC) 98 70 - 110 mg/dL METABOLIC PANEL, COMPREHENSIVE Collection Time: 02/20/19  1:23 AM  
Result Value Ref Range Sodium 141 136 - 145 mmol/L Potassium 3.3 (L) 3.5 - 5.5 mmol/L Chloride 112 (H) 100 - 108 mmol/L  
 CO2 22 21 - 32 mmol/L Anion gap 7 3.0 - 18 mmol/L Glucose 101 (H) 74 - 99 mg/dL BUN 7 7.0 - 18 MG/DL Creatinine 0.73 0.6 - 1.3 MG/DL  
 BUN/Creatinine ratio 10 (L) 12 - 20 GFR est AA >60 >60 ml/min/1.73m2 GFR est non-AA >60 >60 ml/min/1.73m2 Calcium 7.5 (L) 8.5 - 10.1 MG/DL Bilirubin, total 0.7 0.2 - 1.0 MG/DL  
 ALT (SGPT) 11 (L) 13 - 56 U/L  
 AST (SGOT) 11 (L) 15 - 37 U/L Alk. phosphatase 77 45 - 117 U/L Protein, total 5.9 (L) 6.4 - 8.2 g/dL Albumin 2.7 (L) 3.4 - 5.0 g/dL Globulin 3.2 2.0 - 4.0 g/dL A-G Ratio 0.8 0.8 - 1.7    
CBC WITH AUTOMATED DIFF Collection Time: 02/20/19  1:23 AM  
Result Value Ref Range WBC 6.8 4.6 - 13.2 K/uL  
 RBC 3.56 (L) 4.20 - 5.30 M/uL  
 HGB 10.7 (L) 12.0 - 16.0 g/dL HCT 33.0 (L) 35.0 - 45.0 % MCV 92.7 74.0 - 97.0 FL  
 MCH 30.1 24.0 - 34.0 PG  
 MCHC 32.4 31.0 - 37.0 g/dL  
 RDW 13.1 11.6 - 14.5 % PLATELET 874 758 - 645 K/uL MPV 10.6 9.2 - 11.8 FL  
 NEUTROPHILS 60 40 - 73 % LYMPHOCYTES 25 21 - 52 % MONOCYTES 12 (H) 3 - 10 % EOSINOPHILS 3 0 - 5 % BASOPHILS 0 0 - 2 %  
 ABS. NEUTROPHILS 4.1 1.8 - 8.0 K/UL  
 ABS. LYMPHOCYTES 1.7 0.9 - 3.6 K/UL  
 ABS. MONOCYTES 0.8 0.05 - 1.2 K/UL ABS. EOSINOPHILS 0.2 0.0 - 0.4 K/UL  
 ABS. BASOPHILS 0.0 0.0 - 0.1 K/UL  
 DF AUTOMATED    
WBC, STOOL Collection Time: 02/20/19  6:50 AM  
Result Value Ref Range White blood cells, stool NO WBC'S SEEN /HPF  
C. DIFFICILE AG & TOXIN A/B Collection Time: 02/20/19  6:50 AM  
Result Value Ref Range GDH ANTIGEN NEGATIVE  NEG    
 C. difficile toxin NEGATIVE  NEG INTERPRETATION NEGATIVE FOR TOXIGENIC C. DIFFICILE NTXCD    
GLUCOSE, POC Collection Time: 02/20/19 11:37 AM  
Result Value Ref Range Glucose (POC) 106 70 - 110 mg/dL Signed By: Karen De La Garza NP February 20, 2019

## 2019-02-20 NOTE — PROGRESS NOTES
Reason for Admission:  Lactic acidosis [E87.2] Cough [R05] Hypokalemia [E87.6] Vomiting [R11.10] Diarrhea [R19.7] Recent foreign travel [Z78.9] RRAT Score:    8 Plan for utilizing home health: To be determined Likelihood of Readmission:   LOW Transition of Care Plan:         
 
 
Initial assessment completed with patient. Cognitive status of patient: oriented to time, place, person and situation. Face sheet information confirmed:  yes. The patient designates her daughter Shellie Landon to participate in his/her discharge plan and to receive any needed information. This patient lives in a single family home. Patient is able to navigate steps as needed. Prior to hospitalization, patient was considered to be independent with ADLs/IADLS : yes . Patient has a current ACP document on file: no The patient and daughter will be available to transport patient home upon discharge. The patient already has no medical equipment available in the home. Patient is not currently active with home health. Patient has not stayed in a skilled nursing facility or rehab. This patient is on dialysis :no 
 Currently, the discharge plan is home vs home health The patient states that she can obtain her medications from the pharmacy, and take her medications as directed. Patient's current insurance is [employer] Care Management Interventions PCP Verified by CM: Yes(per pt, she saw her pcp last week) Palliative Care Criteria Met (RRAT>21 & CHF Dx)?: No 
Mode of Transport at Discharge: Other (see comment)(family) Transition of Care Consult (CM Consult): Discharge Planning Physical Therapy Consult: No 
Occupational Therapy Consult: No 
Speech Therapy Consult: No 
Current Support Network: Lives Alone, Family Lives [place] Confirm Follow Up Transport: Family Plan discussed with Pt/Family/Caregiver: Yes Discharge Location Discharge Placement: Unable to determine at this time JAXSON Sotelo RN Care Management Pager: [phone]

## 2019-02-20 NOTE — ROUTINE PROCESS
TRANSFER - IN REPORT: 
 
Verbal report received from [facility] RN(name) on Catarino Ley  being received from ED(unit) for routine progression of care Report consisted of patients Situation, Background, Assessment and  
Recommendations(SBAR). Information from the following report(s) ED Summary was reviewed with the receiving nurse. Opportunity for questions and clarification was provided. Assessment completed upon patients arrival to unit and care assumed Primary Nurse Tr Foss RN and Taty Mansfield RN performed a dual skin assessment on this patient No impairment noted Festus score is 23 Bedside and Verbal shift change report given to [facility] Gary Chanel (oncoming nurse) by Jada Stewart RN (offgoing nurse). Report given with SBAR, Kardex, Intake/Output, MAR, Accordion and Recent Results.

## 2019-02-20 NOTE — ROUTINE PROCESS
Bedside shift change report given to [address] S[address] (oncoming nurse) by Jack Otero (offgoing nurse). Report included the following information SBAR, Kardex, Intake/Output, MAR, Recent Results and Quality Measures.

## 2019-02-20 NOTE — PROGRESS NOTES
conducted an initial consultation and Spiritual Assessment for Aravind Fajardo, who is a 77 y.o.,female. Patients Primary Language is: [place]. According to the patients EMR Protestant Affiliation is: Buddhism. The reason the Patient came to the hospital is:  
Patient Active Problem List  
 Diagnosis Date Noted  Cough 02/19/2019  Diarrhea 02/19/2019  Hypokalemia 02/19/2019  Lactic acidosis 02/19/2019  Vomiting 02/19/2019  Recent foreign travel 02/19/2019  Arthritis of sacrum ([facility] [address] 12/02/2016 The  provided the following Interventions: 
Initiated a relationship of care and support. Explored issues of alfonzo, belief, spirituality and Jain/ritual needs while hospitalized. Listened empathically. Provided chaplaincy education. Provided information about Spiritual Care Services. Offered prayer and assurance of continued prayers on patient's behalf. Chart reviewed. The following outcomes where achieved: 
Patient shared limited information about both their medical narrative and spiritual journey/beliefs. Patient processed feeling about current hospitalization. Patient expressed gratitude for 's visit. Assessment: 
Patient does not have any Jain/cultural needs that will affect patients preferences in health care. There are no spiritual or Jain issues which require intervention at this time. Plan: 
Chaplains will continue to follow and will provide pastoral care on an as needed/requested basis.  recommends bedside caregivers page  on duty if patient shows signs of acute spiritual or emotional distress. [facility] Spiritual Care  
[phone]

## 2019-02-20 NOTE — PROGRESS NOTES
NUTRITION Nutrition Consult: General Nutrition Management & Supplements RECOMMENDATIONS / PLAN:  
 
- Add supplement: Ensure Clear BID, Ensure Enlive once daily 
- Continue RD inpatient monitoring and evaluation. NUTRITION INTERVENTIONS & DIAGNOSIS:  
 
[x] Meals/snacks: general/healthful diet [x] Medical food supplement therapy: initiate Nutrition Diagnosis:  Predicted inadequate oral intake related to pt with nausea/vomiting/diarrhea x several days PTA as evidenced by poor meal intake since admission with continued nausea and loose stools. ASSESSMENT:  
 
Pt unavailable at time of visit. Admitted with c/o nausea/vomiting/diarrhea x 10 days PTA. Poor po intake today per chart. Continues to have nausea and loose BM. Stool sent for C. Diff; negative. Average po intake adequate to meet patients estimated nutritional needs:   [] Yes     [] No   [x] Unable to determine at this time Diet: DIET REGULAR Food Allergies:  None known Current Appetite:   [] Good     [] Fair     [] Poor     [x] Other: nausea Appetite/meal intake prior to admission:   [] Good     [] Fair     [] Poor     [x] Other: unknown Feeding Limitations:  [] Swallowing difficulty    [] Chewing difficulty    [] Other: 
Current Meal Intake:  
Patient Vitals for the past 100 hrs: 
 % Diet Eaten 02/20/19 1221 25 % 02/20/19 0906 25 % BM:  2/20 - watery Skin Integrity: no pressure injury or wound noted Edema:   [x] No     [] Yes Pertinent Medications: Reviewed: pepcid, zofran, zosyn, calcium vitamin (future medication) Recent Labs  
  02/20/19 
0123 02/19/19 
1210  137  
K 3.3* 3.0*  
* 103 CO2 22 25 * 101* BUN 7 10 CREA 0.73 0.93  
CA 7.5* 9.0 MG  --  2.0 ALB 2.7* 3.6 SGOT 11* 14* ALT 11* 13 Intake/Output Summary (Last 24 hours) at 2/20/2019 1725 Last data filed at 2/20/2019 4165 Gross per 24 hour Intake 1850 ml Output 3800 ml Net -1950 ml  
 
 
 Anthropometrics: 
Ht Readings from Last 1 Encounters:  
02/19/19 5' 5.98\" (1.676 m) Last 3 Recorded Weights in this Encounter 02/19/19 1155 02/20/19 0400 Weight: 77.1 kg (170 lb) 78.9 kg (173 lb 14.4 oz) Body mass index is 28.08 kg/m². Overweight Weight History:  Noted stable weight in past 2 years; net wt loss of 7 lb (3.9%) x 2 years and 3 months PTA per chart hx 
 
Weight Metrics 2/20/2019 3/3/2017 2/24/2017 2/20/2017 12/2/2016 11/17/2016 9/29/2016 Weight 173 lb 14.4 oz 175 lb 3.2 oz 175 lb 8 oz 177 lb 186 lb 1.1 oz 180 lb 178 lb BMI 28.08 kg/m2 28.28 kg/m2 28.33 kg/m2 28.57 kg/m2 30.03 kg/m2 28.62 kg/m2 28.73 kg/m2 Admitting Diagnosis: Lactic acidosis [E87.2] Cough [R05] Hypokalemia [E87.6] Vomiting [R11.10] Diarrhea [R19.7] Recent foreign travel [Z78.9] Pertinent PMHx:  Skin cancer, depression Education Needs:        [x] None identified  [] Identified - Not appropriate at this time  []  Identified and addressed - refer to education log Learning Limitations:   [x] None identified  [] Identified Cultural, Yazidism & ethnic food preferences:  [x] None identified    [] Identified and addressed ESTIMATED NUTRITION NEEDS:  
 
Calories: 4117-4969 kcal (MSJx1.2-1.3) based on  [x] Actual BW: 79 kg      [] IBW Protein: 63-79 gm (0.8-1 gm/kg) based on  [x] Actual BW      [] IBW Fluid: 1 mL/kcal 
  
MONITORING & EVALUATION:  
 
Nutrition Goal(s): 1. Po intake of meals will meet >75% of patient estimated nutritional needs within the next 7 days. Outcome:  [] Met/Ongoing    []  Not Met    [x] New/Initial Goal  
 
Monitoring:   [x] Food and beverage intake   [x] Diet order   [x] Nutrition-focused physical findings   [x] Treatment/therapy   [] Weight   [] Enteral nutrition intake Previous Recommendations (for follow-up assessments only):     []   Implemented       []   Not Implemented (RD to address)      [] No Longer Appropriate     [] No Recommendation Made Discharge Planning:  Regular diet [x] Participated in care planning, discharge planning, & interdisciplinary rounds as appropriate Won Velázquez, RD Pager: [phone]

## 2019-02-21 VITALS
WEIGHT: 170 LBS | TEMPERATURE: 97 F | HEIGHT: 66 IN | RESPIRATION RATE: 15 BRPM | DIASTOLIC BLOOD PRESSURE: 77 MMHG | OXYGEN SATURATION: 99 % | HEART RATE: 74 BPM | SYSTOLIC BLOOD PRESSURE: 150 MMHG | BODY MASS INDEX: 27.32 KG/M2

## 2019-02-21 LAB
ALBUMIN SERPL-MCNC: 2.8 G/DL (ref 3.4–5)
ALBUMIN/GLOB SERPL: 0.9 {RATIO} (ref 0.8–1.7)
ALP SERPL-CCNC: 77 U/L (ref 45–117)
ALT SERPL-CCNC: 11 U/L (ref 13–56)
ANION GAP SERPL CALC-SCNC: 7 MMOL/L (ref 3–18)
AST SERPL-CCNC: 12 U/L (ref 15–37)
BACTERIA SPEC CULT: ABNORMAL
BACTERIA SPEC CULT: ABNORMAL
BASOPHILS # BLD: 0 K/UL (ref 0–0.1)
BASOPHILS NFR BLD: 0 % (ref 0–2)
BILIRUB SERPL-MCNC: 0.4 MG/DL (ref 0.2–1)
BUN SERPL-MCNC: 6 MG/DL (ref 7–18)
BUN/CREAT SERPL: 9 (ref 12–20)
CALCIUM SERPL-MCNC: 8.1 MG/DL (ref 8.5–10.1)
CHLORIDE SERPL-SCNC: 114 MMOL/L (ref 100–108)
CO2 SERPL-SCNC: 25 MMOL/L (ref 21–32)
CREAT SERPL-MCNC: 0.69 MG/DL (ref 0.6–1.3)
DIFFERENTIAL METHOD BLD: ABNORMAL
EOSINOPHIL # BLD: 0.3 K/UL (ref 0–0.4)
EOSINOPHIL NFR BLD: 5 % (ref 0–5)
ERYTHROCYTE [DISTWIDTH] IN BLOOD BY AUTOMATED COUNT: 13.5 % (ref 11.6–14.5)
GLOBULIN SER CALC-MCNC: 3.1 G/DL (ref 2–4)
GLUCOSE SERPL-MCNC: 99 MG/DL (ref 74–99)
HCT VFR BLD AUTO: 34.4 % (ref 35–45)
HGB BLD-MCNC: 11.2 G/DL (ref 12–16)
LYMPHOCYTES # BLD: 2 K/UL (ref 0.9–3.6)
LYMPHOCYTES NFR BLD: 32 % (ref 21–52)
MCH RBC QN AUTO: 30.6 PG (ref 24–34)
MCHC RBC AUTO-ENTMCNC: 32.6 G/DL (ref 31–37)
MCV RBC AUTO: 94 FL (ref 74–97)
MONOCYTES # BLD: 0.6 K/UL (ref 0.05–1.2)
MONOCYTES NFR BLD: 10 % (ref 3–10)
NEUTS SEG # BLD: 3.3 K/UL (ref 1.8–8)
NEUTS SEG NFR BLD: 53 % (ref 40–73)
PLATELET # BLD AUTO: 246 K/UL (ref 135–420)
PMV BLD AUTO: 10.8 FL (ref 9.2–11.8)
POTASSIUM SERPL-SCNC: 4 MMOL/L (ref 3.5–5.5)
PROT SERPL-MCNC: 5.9 G/DL (ref 6.4–8.2)
RBC # BLD AUTO: 3.66 M/UL (ref 4.2–5.3)
SERVICE CMNT-IMP: ABNORMAL
SODIUM SERPL-SCNC: 146 MMOL/L (ref 136–145)
WBC # BLD AUTO: 6.3 K/UL (ref 4.6–13.2)

## 2019-02-21 PROCEDURE — 74011250636 HC RX REV CODE- 250/636: Performed by: INTERNAL MEDICINE

## 2019-02-21 PROCEDURE — 97165 OT EVAL LOW COMPLEX 30 MIN: CPT

## 2019-02-21 PROCEDURE — 80053 COMPREHEN METABOLIC PANEL: CPT

## 2019-02-21 PROCEDURE — 74011000258 HC RX REV CODE- 258: Performed by: INTERNAL MEDICINE

## 2019-02-21 PROCEDURE — 74011250637 HC RX REV CODE- 250/637: Performed by: INTERNAL MEDICINE

## 2019-02-21 PROCEDURE — 36415 COLL VENOUS BLD VENIPUNCTURE: CPT

## 2019-02-21 PROCEDURE — 85025 COMPLETE CBC W/AUTO DIFF WBC: CPT

## 2019-02-21 RX ORDER — AMLODIPINE BESYLATE 2.5 MG/1
5 TABLET ORAL DAILY
Qty: 30 TAB | Refills: 0 | Status: SHIPPED | OUTPATIENT
Start: 2019-02-21

## 2019-02-21 RX ORDER — CIPROFLOXACIN 250 MG/1
250 TABLET, FILM COATED ORAL 2 TIMES DAILY
Qty: 10 TAB | Refills: 0 | Status: SHIPPED | OUTPATIENT
Start: 2019-02-21 | End: 2019-02-26

## 2019-02-21 RX ORDER — FAMOTIDINE 20 MG/1
20 TABLET, FILM COATED ORAL 2 TIMES DAILY
Qty: 30 TAB | Refills: 0 | Status: SHIPPED | OUTPATIENT
Start: 2019-02-21

## 2019-02-21 RX ORDER — UREA 10 %
2 LOTION (ML) TOPICAL 2 TIMES DAILY
Qty: 30 TAB | Refills: 0 | Status: SHIPPED | OUTPATIENT
Start: 2019-02-21

## 2019-02-21 RX ADMIN — CITALOPRAM HYDROBROMIDE 20 MG: 20 TABLET ORAL at 09:24

## 2019-02-21 RX ADMIN — LACTOBACILLUS TAB 2 TABLET: TAB at 09:24

## 2019-02-21 RX ADMIN — PIPERACILLIN SODIUM AND TAZOBACTAM SODIUM 3.38 G: 3; .375 INJECTION, POWDER, LYOPHILIZED, FOR SOLUTION INTRAVENOUS at 05:40

## 2019-02-21 RX ADMIN — ACETAMINOPHEN 650 MG: 325 TABLET ORAL at 05:39

## 2019-02-21 RX ADMIN — FAMOTIDINE 20 MG: 20 TABLET ORAL at 09:24

## 2019-02-21 RX ADMIN — PIPERACILLIN SODIUM AND TAZOBACTAM SODIUM 3.38 G: 3; .375 INJECTION, POWDER, LYOPHILIZED, FOR SOLUTION INTRAVENOUS at 00:02

## 2019-02-21 RX ADMIN — Medication 1 TABLET: at 09:24

## 2019-02-21 RX ADMIN — GABAPENTIN 300 MG: 300 CAPSULE ORAL at 09:24

## 2019-02-21 NOTE — PROGRESS NOTES
Problem: Self Care Deficits Care Plan (Adult) Goal: *Acute Goals and Plan of Care (Insert Text) Outcome: Resolved/Met Date Met: 02/21/19 Occupational Therapy EVALUATION/discharge Patient: Aisha Echavarria (78 y.o. female) Date: 2/21/2019 Primary Diagnosis: Lactic acidosis [E87.2] Cough [R05] Hypokalemia [E87.6] Vomiting [R11.10] Diarrhea [R19.7] Recent foreign travel [Z78.9] Precautions: None ASSESSMENT AND RECOMMENDATIONS: 
Pt cleared to participate in skilled OT eval by RN. Upon entering room, pt supine with HOB elevated, alert, and agreeable to therapy. Based on the objective data described below, the patient presents she is able to perform all basic self-care tasks an independent level. Educated pt on the role of OT and evaluation process, and if pt ever notices a change in performance in self-care, to notify her physician, if needed. Pt demonstrated understanding. Skilled occupational therapy is not indicated at this time. Discharge Recommendations: None Further Equipment Recommendations for Discharge: N/A Barriers to Learning/Limitations: None Compensate with: visual, verbal, tactile, kinesthetic cues/model COMPLEXITY Eval Complexity: History: LOW Complexity : Brief history review ; Examination: LOW Complexity : 1-3 performance deficits relating to physical, cognitive , or psychosocial skils that result in activity limitations and / or participation restrictions ; Decision Making:LOW Complexity : No comorbidities that affect functional and no verbal or physical assistance needed to complete eval tasks  Assessment: Low Complexity SUBJECTIVE:  
Patient stated I have five dogs OBJECTIVE DATA SUMMARY:  
 
Past Medical History:  
Diagnosis Date  Cancer ([facility] [address] SKIN  
 Neuropathy  Psychiatric disorder DEPRESSION  
 Sacroiliitis ([facility] [address] Past Surgical History:  
Procedure Laterality Date  ABDOMEN SURGERY PROC UNLISTED    
 GASTROPLASTY  BREAST SURGERY PROCEDURE UNLISTED MASTOPLASTY  HX ABDOMINOPLASTY  HX BACK SURGERY    
 HX CHOLECYSTECTOMY  HX HEENT    
 BLEPHAROPLASTY  HX HEENT    
 FOREHEAD LIFT  
 HX HYSTERECTOMY  HX KNEE ARTHROSCOPY    
 HX OTHER SURGICAL EXC OF MELANOMA  HX TONSILLECTOMY Prior Level of Function/Home Situation: Pt reports she was (I) with ADLs PTA. 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 Home Situation Home Environment: Private residence # Steps to Enter: 3 One/Two Story Residence: One story Living Alone: Yes Support Systems: Family member(s) Patient Expects to be Discharged to[de-identified] Private residence Current DME Used/Available at Home: None 
[x]     Right hand dominant   []     Left hand dominantCognitive/Behavioral Status: 
Neurologic State: Alert Cognition: Follows commands; Appropriate decision making Safety/Judgement: Awareness of environment; Fall prevention Skin: Visible skin appeared intact Edema: None noted Vision/Perceptual:   
Acuity: Within Defined Limits(with glasses) Coordination: 
Coordination: Within functional limits(BUEs) Fine Motor Skills-Upper: Left Intact; Right Intact Gross Motor Skills-Upper: Left Intact; Right Intact Balance: 
Sitting: Intact Standing: Intact; Without support Strength: 
Strength: Within functional limits(BUEs) Tone & Sensation: 
Tone: Normal(BUEs) Sensation: Intact(BUEs) Range of Motion: 
AROM: Within functional limits(BUEs) Functional Mobility and Transfers for ADLs: 
Bed Mobility: 
Supine to Sit: Independent Transfers: 
Sit to Stand: Independent Toilet Transfer : Independent Bathroom Mobility: Independent ADL Assessment: 
Feeding: Independent Oral Facial Hygiene/Grooming: Independent Bathing: Independent Upper Body Dressing: Independent Lower Body Dressing: Independent Toileting: Independent ADL Intervention: 
Cognitive Retraining Safety/Judgement: Awareness of environment; Fall prevention Pain: 
Pt reports 0/10 pain or discomfort prior to treatment.   
Pt reports 0/10 pain or discomfort post treatment. Activity Tolerance:  
Good Please refer to the flowsheet for vital signs taken during this treatment. After treatment:  
[]  Patient left in no apparent distress sitting up in chair 
[x]  Patient left in no apparent distress sitting at the edge of the bed 
[x]  Call bell left within reach 
[]  Nursing notified 
[]  Caregiver present 
[]  Bed alarm activated COMMUNICATION/EDUCATION:  
Communication/Collaboration: 
[]      Home safety education was provided and the patient/caregiver indicated understanding. [x]      Patient/family have participated as able and agree with findings and recommendations. []      Patient is unable to participate in plan of care at this time. Elio Santiago OT Time Calculation: 20 mins

## 2019-02-21 NOTE — PROGRESS NOTES
PT eval order received and chart reviewed. Spoke with patient who reports they are at functional baseline for mobility and observed ambulation in room with no gait abnormalities or LoB. Patient reports she is at baseline and that they have no further need for assist or AD/DME. Will sign off and educated patient that if their functional mobility needs should change that they may have MD re-order PT at any time. Thank you for this referral. Kathyleen Gowers, PT, DPT.

## 2019-02-21 NOTE — ROUTINE PROCESS
Bedside shift change report given to Laney Shannon (oncoming nurse) by Dexter Joshi (offgoing nurse). Report included the following information SBAR, Intake/Output, MAR, Recent Results and Cardiac Rhythm NSR.

## 2019-02-21 NOTE — PROGRESS NOTES
Discharge instructions, follow up appointment info, and prescriptions provided. Understanding verbalized. Patient stable at this time with no nausea,vomiting, or diarrhea. She has eaten 100% of her lunch and tolerated well. All belongings are at the bedside, and wheelchair will be arranged for patient to the main entrance.

## 2019-02-21 NOTE — PROGRESS NOTES
Problem: Falls - Risk of 
Goal: *Absence of Falls Document Jonathan Tripp Fall Risk and appropriate interventions in the flowsheet. Outcome: Progressing Towards Goal 
Fall Risk Interventions: 
  
 
  
 
Medication Interventions: Patient to call before getting OOB, Teach patient to arise slowly

## 2019-02-21 NOTE — DISCHARGE SUMMARY
[facility]  Discharge Summary       Patient: Catarino Ley Age: 77 y.o. : 1950 MR#: [id number] SSN: [id number]  PCP on record: Kallie Leyden, MD  Admit date: 2019  Discharge date: 2019    Disposition:    [x]Home   []Home with Home Health   []SNF/NH   []Rehab   []Home with family   []Alternate Facility:____________________    Admission Diagnoses:  Lactic acidosis [E87.2]  Cough [R05]  Hypokalemia [E87.6]  Vomiting [R11.10]  Diarrhea [R19.7]  Recent foreign travel [Z78.9]    Discharge Diagnoses:                             1. Nausea, vomiting and diarrhea. Sec to # 2. Improved. 2. Suspect gastroenteritis, probably viral- improved. 3. UTI-poa- Bactrim DS  4. Hypokalemia-resolved. 5. Dehydration- resolved. 6. Neuropathy   7. Depression   8. Elevated BP in patient w/o hx HTN. Discharge Medications:     Current Discharge Medication List      START taking these medications    Details   famotidine (PEPCID) 20 mg tablet Take 1 Tab by mouth two (2) times a day. Qty: 30 Tab, Refills: 0      Lactobacillus Acidoph & Bulgar (FLORANEX) 1 million cell tab tablet Take 2 Tabs by mouth two (2) times a day. Qty: 30 Tab, Refills: 0      ciprofloxacin HCl (CIPRO) 250 mg tablet Take 1 Tab by mouth two (2) times a day for 5 days. Qty: 10 Tab, Refills: 0    Associated Diagnoses: Urinary tract infection without hematuria, site unspecified      amLODIPine (NORVASC) 2.5 mg tablet Take 2 Tabs by mouth daily. Qty: 30 Tab, Refills: 0         CONTINUE these medications which have NOT CHANGED    Details   calcium-cholecalciferol, d3, (CALCIUM 600 + D) 600-125 mg-unit tab Take 1 Tab by mouth daily. gabapentin (NEURONTIN) 300 mg capsule TAKE 1 CAPSULE BY MOUTH EVERY MORNING, 2 CAPSULES EVERY AFTERNOON, AND 3 CAPSULES AT NIGHT  Refills: 4      citalopram (CELEXA) 40 mg tablet Take 40 mg by mouth daily. melatonin tab tablet Take  by mouth nightly. PYRIDOXINE HCL (VITAMIN B-6 PO) Take 1 Tab by mouth daily. alendronate (FOSAMAX) 70 mg tablet Take 70 mg by mouth every seven (7) days. STOP taking these medications       tiZANidine (ZANAFLEX) 4 mg tablet Comments:   Reason for Stopping:         HYDROcodone-acetaminophen (NORCO)  mg tablet Comments:   Reason for Stopping:         oxyCODONE-acetaminophen (PERCOCET) 5-325 mg per tablet Comments:   Reason for Stopping:         cyclobenzaprine (FLEXERIL) 10 mg tablet Comments:   Reason for Stopping:         diazePAM (VALIUM) 5 mg tablet Comments:   Reason for Stopping:               Consults:    - NONE    Procedures:  -   NONE    Significant Diagnostic Studies:   -  Study Result     INDICATION:  Cough.     COMPARISON:  None.     FINDINGS: A portable AP radiograph of the chest was obtained at 1445 hours. The  lungs are clear. The cardiac and mediastinal contours and pulmonary vascularity  are normal. No acute osseous abnormality.     IMPRESSION  IMPRESSION: No acute cardiopulmonary process. Study Result     CT ABDOMEN/PELVIS WITH CONTRAST     CPT CODE: 40808,26924     HISTORY: Diarrhea and abdominal pain for 2 weeks.     COMPARISON: None.     TECHNIQUE: 5 mm helical scans were obtained of the abdomen and pelvis after  uneventful administration of intravenous contrast. 100 cc of Isovue-300 was  given. Coronal and sagittal reformation. All CT scans are performed using dose  optimization techniques as appropriate to the performed exam including the  following: Automated exposure control, adjustment of mA and/or kV according to  patient size, and use of iterative reconstructive technique. .     FINDINGS: Evaluation slightly limited by streak artifact from lumbar spine  hardware.     The visualized lung bases are clear.      Gallbladder is absent. There is dilatation of the common bile duct and  intrahepatic ductal system.     Liver without focal mass lesion. .     Spleen.  Homogeneous enhancement of the pancreas. .       The adrenal glands are mildly thickened and nodular. .       The kidneys enhance symmetrically. Cortical hypodensity measures 6 mm in the  mid left kidney and 8 mm slightly more inferiorly. Not definitively  characterized but favored to represent cysts. .  No hydronephrosis.     There is no free intraperitoneal air, free fluid, or fluid collections.       No abdominal or pelvic lymphadenopathy.       Postsurgical changes of gastric bypass. Fluid in nondilated excluded portion of  the stomach. No dilated small bowel loops or colon. Fluid seen in the right  colon and in the sigmoid colon. No definite wall colonic thickening or  pericolonic fat stranding. The appendix does not appear inflamed.     The bladder is under distended and therefore incompletely evaluated. Uterus is  absent. No adnexal mass. .      Mild calcifications in the abdominal aorta. No aneurysmal dilatation.     Spinal instrumentation hardware spanning L2-S1. The hardware appears intact. Approximate 50% anterior wedging of L3. Appears stable from radiograph  10/30/2015 Additional fixation screws traverse the right SI joints. Nonspecific  periscrew lucencies at L2.     IMPRESSION  IMPRESSION:     Small volume fluid within the right colon and sigmoid colon which may correspond  with patient's history of diarrhea. The colon otherwise appears within normal  limits.       Dilated common bile duct and intrahepatic duct system which may be related to  cholecystectomy reservoir effect.       Gastric bypass.      Hysterectomy.      2 subcentimeter hypodensities of the left kidney too small to further  characterize but favored to be small cysts.       Spinal instrumentation with stable compression fracture of L3. There are  nonspecific pedicular screw lucencies at [address]  by Problem   HPI per admitting MD  \"Ms. Maria Del Carmen Hale is a 77 y.o.   female with a PMH of neuropathy, depression who presents with c/c of nausea, vomiting and diarrhea. Patient recently returned from [facility]. She denies sick contact. She started having nausea, vomiting and diarrhea since Friday. She also has cough for about 10 days now. 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 She also had fever of 102.2. She denies chest pain or shortness of breathing. She also denies urinary complaint. Here in ED she has low grade temp. She was suspected to have UTI and started on IV antibiotics. Stool for Cdiff and culture was also send. \"    1. Nausea, vomiting and diarrhea. Secondary to  gastroenteritis, probably viral. Admitting diagnosis. With associated dehydration, hypokalemia. Improved. CXR with no acute pathology. CTA ABD/Pelvis with small volume fluid within the right colon and sigmoid colon which may correspond with patient's history of influenza A/B neg. No fevers, no leukocytosis. Suspect viral etiology. Improved on IVF, rest, PRN antiemetics. Lytes repleted. No further inpatient interventions warranted. Exam reassuring. OP f/u with PCP. 2.  UTI-poa via urinalysis. Urine culture with contaminated results. Given clinical presentation of symptomatic UTI, will treat with Bactrim DS X 5 days. No further inpatient interventions warranted. OP f/u PCP. 3. Elevated BP. In patient with no history of HTN. Initiated on Norvasc 5mg. BP responding well. No further inpatient interventions warranted. OP f/u PCP. 4. Neuropathy. No acute issues. Continued on home regimen. OP f/u PCP. 5. Depression. No SI or HI. No acute issues. Continued on home regimen. OP f/u PCP. Today's examination of the patient revealed:     Subjective:   Alert and oriented X 3. NAD. No chest pain or chest discomfort. No n/v/d/c or abd pain. No f/c. Tolerating oral intake. No new complaints. No overnight events.    Objective:   VS:   Visit Vitals  /77 (BP 1 Location: Left arm, BP Patient Position: Supine)   Pulse 74   Temp 97 °F (36.1 °C)   Resp 15   Ht 5' 5.98\" (1.676 m)   Wt 77.1 kg (170 lb)   SpO2 99%   Breastfeeding? No   BMI 27.45 kg/m²      Tmax/24hrs: Temp (24hrs), Av °F (36.7 °C), Min:97 °F (36.1 °C), Max:98.8 °F (37.1 °C)     Input/Output:     Intake/Output Summary (Last 24 hours) at 2019 1316  Last data filed at 2019 1221  Gross per 24 hour   Intake 2055 ml   Output 4250 ml   Net -2195 ml     General:  Alert, NAD  Cardiovascular:  RRR  Pulmonary:  LSC throughout; respiratory effort WNL  GI:  +BS in all four quadrants, soft, non-tender  Extremities:  No edema; 2+ dorsalis pedis pulses bilaterally  Neuro: alert and oriented X 3. Labs:    Recent Results (from the past 24 hour(s))   GLUCOSE, POC    Collection Time: 19  4:00 PM   Result Value Ref Range    Glucose (POC) 116 (H) 70 - 110 mg/dL   INFLUENZA A & B AG (RAPID TEST)    Collection Time: 19  5:30 PM   Result Value Ref Range    Influenza A Antigen NEGATIVE  NEG      Influenza B Antigen NEGATIVE  NEG     CBC WITH AUTOMATED DIFF    Collection Time: 19  3:16 AM   Result Value Ref Range    WBC 6.3 4.6 - 13.2 K/uL    RBC 3.66 (L) 4.20 - 5.30 M/uL    HGB 11.2 (L) 12.0 - 16.0 g/dL    HCT 34.4 (L) 35.0 - 45.0 %    MCV 94.0 74.0 - 97.0 FL    MCH 30.6 24.0 - 34.0 PG    MCHC 32.6 31.0 - 37.0 g/dL    RDW 13.5 11.6 - 14.5 %    PLATELET 647 535 - 838 K/uL    MPV 10.8 9.2 - 11.8 FL    NEUTROPHILS 53 40 - 73 %    LYMPHOCYTES 32 21 - 52 %    MONOCYTES 10 3 - 10 %    EOSINOPHILS 5 0 - 5 %    BASOPHILS 0 0 - 2 %    ABS. NEUTROPHILS 3.3 1.8 - 8.0 K/UL    ABS. LYMPHOCYTES 2.0 0.9 - 3.6 K/UL    ABS. MONOCYTES 0.6 0.05 - 1.2 K/UL    ABS. EOSINOPHILS 0.3 0.0 - 0.4 K/UL    ABS.  BASOPHILS 0.0 0.0 - 0.1 K/UL    DF AUTOMATED     METABOLIC PANEL, COMPREHENSIVE    Collection Time: 19  3:16 AM   Result Value Ref Range    Sodium 146 (H) 136 - 145 mmol/L    Potassium 4.0 3.5 - 5.5 mmol/L    Chloride 114 (H) 100 - 108 mmol/L    CO2 25 21 - 32 mmol/L    Anion gap 7 3.0 - 18 mmol/L    Glucose 99 74 - 99 mg/dL    BUN 6 (L) 7.0 - 18 MG/DL    Creatinine 0.69 0.6 - 1.3 MG/DL    BUN/Creatinine ratio 9 (L) 12 - 20      GFR est AA >60 >60 ml/min/1.73m2    GFR est non-AA >60 >60 ml/min/1.73m2    Calcium 8.1 (L) 8.5 - 10.1 MG/DL    Bilirubin, total 0.4 0.2 - 1.0 MG/DL    ALT (SGPT) 11 (L) 13 - 56 U/L    AST (SGOT) 12 (L) 15 - 37 U/L    Alk. phosphatase 77 45 - 117 U/L    Protein, total 5.9 (L) 6.4 - 8.2 g/dL    Albumin 2.8 (L) 3.4 - 5.0 g/dL    Globulin 3.1 2.0 - 4.0 g/dL    A-G Ratio 0.9 0.8 - 1.7         ADDITIONAL INFORMATION: If you experience any of the following symptoms but not limited to Fever, chills, nausea, vomiting, diarrhea, change in mentation, falling, bleeding, shortness of breath, chest pain, please call your primary care physician or return to the emergency room if you cannot get hold of your doctor:     Condition: Stable    Follow-up Appointments:   1. Follow up with PCP in 5-7 days.      >30 minutes spent coordinating this discharge (review instructions/follow-up, prescriptions, preparing report for sign off)    Signed:  Rosita Mckeon NP  2/21/2019  1:16 PM

## 2019-02-21 NOTE — DISCHARGE INSTRUCTIONS
Patient Education        Diarrhea: Care Instructions  Your Care Instructions    Diarrhea is loose, watery stools (bowel movements). The exact cause is often hard to find. Sometimes diarrhea is your body's way of getting rid of what caused an upset stomach. Viruses, food poisoning, and many medicines can cause diarrhea. Some people get diarrhea in response to emotional stress, anxiety, or certain foods. Almost everyone has diarrhea now and then. It usually isn't serious, and your stools will return to normal soon. The important thing to do is replace the fluids you have lost, so you can prevent dehydration. The doctor has checked you carefully, but problems can develop later. If you notice any problems or new symptoms, get medical treatment right away. Follow-up care is a key part of your treatment and safety. Be sure to make and go to all appointments, and call your doctor if you are having problems. It's also a good idea to know your test results and keep a list of the medicines you take. How can you care for yourself at home? · Watch for signs of dehydration, which means your body has lost too much water. Dehydration is a serious condition and should be treated right away. Signs of dehydration are:  ? Increasing thirst and dry eyes and mouth. ? Feeling faint or lightheaded. ? Darker urine, and a smaller amount of urine than normal.  · To prevent dehydration, drink plenty of fluids, enough so that your urine is light yellow or clear like water. Choose water and other caffeine-free clear liquids until you feel better. If you have kidney, heart, or liver disease and have to limit fluids, talk with your doctor before you increase the amount of fluids you drink. · Begin eating small amounts of mild foods the next day, if you feel like it. ? Try yogurt that has live cultures of Lactobacillus. (Check the label.)  ?  Avoid spicy foods, fruits, alcohol, and caffeine until 48 hours after all symptoms are gone.  ? Avoid chewing gum that contains sorbitol. ? Avoid dairy products (except for yogurt with Lactobacillus) while you have diarrhea and for 3 days after symptoms are gone. · The doctor may recommend that you take over-the-counter medicine, such as loperamide (Imodium), if you still have diarrhea after 6 hours. Read and follow all instructions on the label. Do not use this medicine if you have bloody diarrhea, a high fever, or other signs of serious illness. Call your doctor if you think you are having a problem with your medicine. When should you call for help? Call 911 anytime you think you may need emergency care. For example, call if:    · You passed out (lost consciousness).     · Your stools are maroon or very bloody.    Call your doctor now or seek immediate medical care if:    · You are dizzy or lightheaded, or you feel like you may faint.     · Your stools are black and look like tar, or they have streaks of blood.     · You have new or worse belly pain.     · You have symptoms of dehydration, such as:  ? Dry eyes and a dry mouth. ? Passing only a little dark urine. ? Feeling thirstier than usual.     · You have a new or higher fever.    Watch closely for changes in your health, and be sure to contact your doctor if:    · Your diarrhea is getting worse.     · You see pus in the diarrhea.     · You are not getting better after 2 days (48 hours). Where can you learn more? Go to http://jag-michelle.info/. Enter J114 in the search box to learn more about \"Diarrhea: Care Instructions. \"  Current as of: September 23, 2018  Content Version: 11.9  © 2554-2350 [employer]. Care instructions adapted under license by [employer] (which disclaims liability or warranty for this information).  If you have questions about a medical condition or this instruction, always ask your healthcare professional. [place] [address] any warranty or liability for your use of this information. Patient Education        Nausea and Vomiting: Care Instructions  Your Care Instructions    When you are nauseated, you may feel weak and sweaty and notice a lot of saliva in your mouth. Nausea often leads to vomiting. Most of the time you do not need to worry about nausea and vomiting, but they can be signs of other illnesses. Two common causes of nausea and vomiting are stomach flu and food poisoning. Nausea and vomiting from viral stomach flu will usually start to improve within 24 hours. Nausea and vomiting from food poisoning may last from 12 to 48 hours. The doctor has checked you carefully, but problems can develop later. If you notice any problems or new symptoms, get medical treatment right away. Follow-up care is a key part of your treatment and safety. Be sure to make and go to all appointments, and call your doctor if you are having problems. It's also a good idea to know your test results and keep a list of the medicines you take. How can you care for yourself at home? · To prevent dehydration, drink plenty of fluids, enough so that your urine is light yellow or clear like water. Choose water and other caffeine-free clear liquids until you feel better. If you have kidney, heart, or liver disease and have to limit fluids, talk with your doctor before you increase the amount of fluids you drink. · Rest in bed until you feel better. · When you are able to eat, try clear soups, mild foods, and liquids until all symptoms are gone for 12 to 48 hours. Other good choices include dry toast, crackers, cooked cereal, and gelatin dessert, such as Jell-O. When should you call for help? Call 911 anytime you think you may need emergency care. For example, call if:    · You passed out (lost consciousness).    Call your doctor now or seek immediate medical care if:    · You have symptoms of dehydration, such as:  ? Dry eyes and a dry mouth.   ? Passing only a little dark urine.  ? Feeling thirstier than usual.     · You have new or worsening belly pain.     · You have a new or higher fever.     · You vomit blood or what looks like coffee grounds.    Watch closely for changes in your health, and be sure to contact your doctor if:    · You have ongoing nausea and vomiting.     · Your vomiting is getting worse.     · Your vomiting lasts longer than 2 days.     · You are not getting better as expected. Where can you learn more? Go to http://jag-michelle.info/. Enter 25 [id number] in the search box to learn more about \"Nausea and Vomiting: Care Instructions. \"  Current as of: September 23, 2018  Content Version: 11.9  © 4830-5903 [employer]. Care instructions adapted under license by [employer] (which disclaims liability or warranty for this information). If you have questions about a medical condition or this instruction, always ask your healthcare professional. [place] [address] any warranty or liability for your use of this information. Patient Education        Traveler's Diarrhea: Care Instructions  Your Care Instructions    Traveler's diarrhea is loose, watery bowel movements you can get when you travel. It also can cause vomiting and belly cramps. This kind of diarrhea is usually caused by bacteria. But sometimes it is caused by a parasite or virus. Most people get it when they eat undercooked, raw, or contaminated foods. You can also get it if you drink contaminated water or if you drink something that has contaminated ice cubes in it. In some cases, new foods can cause diarrhea. In other cases, the stress and anxiety of travel can cause it. Traveler's diarrhea usually isn't serious. Most of the time, bowel movements return to normal quickly. The most important thing is to prevent dehydration. Make sure to drink a lot of fluids. Follow-up care is a key part of your treatment and safety.  Be sure to make and go to all appointments, and call your doctor if you are having problems. It's also a good idea to know your test results and keep a list of the medicines you take. How can you care for yourself at home? · Watch for signs of dehydration. This means your body has lost too much water. Dehydration is serious and needs to be treated right away. Signs of dehydration are:  ? Feeling more thirsty than usual.  ? Dry eyes and mouth. ? Feeling faint or lightheaded. ? Darker urine, and a smaller amount of urine than normal.  · To prevent dehydration, drink plenty of fluids, enough so that your urine is light yellow or clear like water. Choose water and other caffeine-free clear liquids until you feel better. If you have kidney, heart, or liver disease and have to limit fluids, talk with your doctor before you increase the amount of fluids you drink. · Start to eat small amounts of mild foods the next day, if you feel like it. ? Avoid spicy foods, fruits, alcohol, and caffeine until 48 hours after all symptoms go away. ? Avoid chewing gum that has sorbitol. ? Try yogurt that has live cultures of Lactobacillus. You can check the label for this. Avoid other dairy products while you have diarrhea and for 3 days after symptoms go away. · Your doctor may recommend an over-the-counter medicine. These may include bismuth subsalicylate (Pepto-Bismol) or loperamide (Imodium). Read and follow all instructions on the label. Do not use these medicines if your doctor does not recommend them. · Be safe with medicines. If your doctor recommends prescription medicine, take it as prescribed. Call your doctor if you think you are having a problem with your medicine. You will get more details on the specific medicines your doctor prescribes. · If your doctor prescribes antibiotics, take them as directed. Do not stop taking them just because you feel better. You need to take the full course of antibiotics. When should you call for help?   Call 911 anytime you think you may need emergency care. For example, call if:    · You passed out (lost consciousness).     · Your stools are maroon or very bloody.    Call your doctor now or seek immediate medical care if:    · You are dizzy or lightheaded, or you feel like you may faint.     · Your stools are black and look like tar, or they have streaks of blood.     · You have diarrhea and your belly pain or cramps are worse.     · You have signs of needing more fluids. You have sunken eyes, a dry mouth, and pass only a little dark urine.    Watch closely for changes in your health, and be sure to contact your doctor if:    · You have 12 or more loose stools in 24 hours.     · You see pus in the diarrhea.     · You have a new or higher fever.     · Your diarrhea does not get better or is more frequent. Where can you learn more? Go to http://jag-michelle.info/. Enter L368 in the search box to learn more about \"Traveler's Diarrhea: Care Instructions. \"  Current as of: March 28, 2018  Content Version: 11.9  © 7014-7606 [employer]. Care instructions adapted under license by [employer] (which disclaims liability or warranty for this information). If you have questions about a medical condition or this instruction, always ask your healthcare professional. [place] [address] any warranty or liability for your use of this information. Patient Education        Urinary Tract Infection in Women: Care Instructions  Your Care Instructions    A urinary tract infection, or UTI, is a general term for an infection anywhere between the kidneys and the urethra (where urine comes out). Most UTIs are bladder infections. They often cause pain or burning when you urinate. UTIs are caused by bacteria and can be cured with antibiotics. Be sure to complete your treatment so that the infection goes away. Follow-up care is a key part of your treatment and safety.  Be sure to make and go to all appointments, and call your doctor if you are having problems. It's also a good idea to know your test results and keep a list of the medicines you take. How can you care for yourself at home? · Take your antibiotics as directed. Do not stop taking them just because you feel better. You need to take the full course of antibiotics. · Drink extra water and other fluids for the next day or two. This may help wash out the bacteria that are causing the infection. (If you have kidney, heart, or liver disease and have to limit fluids, talk with your doctor before you increase your fluid intake.)  · Avoid drinks that are carbonated or have caffeine. They can irritate the bladder. · Urinate often. Try to empty your bladder each time. · To relieve pain, take a hot bath or lay a heating pad set on low over your lower belly or genital area. Never go to sleep with a heating pad in place. To prevent UTIs  · Drink plenty of water each day. This helps you urinate often, which clears bacteria from your system. (If you have kidney, heart, or liver disease and have to limit fluids, talk with your doctor before you increase your fluid intake.)  · Urinate when you need to. · Urinate right after you have sex. · Change sanitary pads often. · Avoid douches, bubble baths, feminine hygiene sprays, and other feminine hygiene products that have deodorants. · After going to the bathroom, wipe from front to back. When should you call for help? Call your doctor now or seek immediate medical care if:    · Symptoms such as fever, chills, nausea, or vomiting get worse or appear for the first time.     · You have new pain in your back just below your rib cage.  This is called flank pain.     · There is new blood or pus in your urine.     · You have any problems with your antibiotic medicine.    Watch closely for changes in your health, and be sure to contact your doctor if:    · You are not getting better after taking an antibiotic for 2 days.     · Your symptoms go away but then come back. Where can you learn more? Go to http://jag-michelle.info/. Enter B036 in the search box to learn more about \"Urinary Tract Infection in Women: Care Instructions. \"  Current as of: March 20, 2018  Content Version: 11.9  © 8002-3326 [employer]. Care instructions adapted under license by [employer] (which disclaims liability or warranty for this information). If you have questions about a medical condition or this instruction, always ask your healthcare professional. [address] any warranty or liability for your use of this information.

## 2019-02-22 LAB
BACTERIA SPEC CULT: NORMAL
SERVICE CMNT-IMP: NORMAL

## 2019-02-25 LAB
BACTERIA SPEC CULT: NORMAL
BACTERIA SPEC CULT: NORMAL
SERVICE CMNT-IMP: NORMAL
SERVICE CMNT-IMP: NORMAL

## 2019-02-26 LAB
FLUAV RNA SPEC QL NAA+PROBE: POSITIVE
FLUBV RNA SPEC QL NAA+PROBE: NEGATIVE
HADV DNA SPEC QL NAA+PROBE: NEGATIVE
HMPV RNA SPEC QL NAA+PROBE: NEGATIVE
HPIV1 RNA SPEC QL NAA+PROBE: NEGATIVE
HPIV2 RNA SPEC QL NAA+PROBE: NEGATIVE
HPIV3 RNA SPEC QL NAA+PROBE: NEGATIVE
RHINOVIRUS RNA SPEC QL NAA+PROBE: NEGATIVE
RSV A RNA SPEC QL NAA+PROBE: NEGATIVE
RSV B RNA SPEC QL NAA+PROBE: NEGATIVE
SPECIMEN SOURCE: ABNORMAL

## 2019-06-03 ENCOUNTER — OFFICE VISIT (OUTPATIENT)
Dept: ORTHOPEDIC SURGERY | Facility: CLINIC | Age: 69
End: 2019-06-03

## 2019-06-03 VITALS
OXYGEN SATURATION: 99 % | TEMPERATURE: 98.3 F | DIASTOLIC BLOOD PRESSURE: 82 MMHG | SYSTOLIC BLOOD PRESSURE: 120 MMHG | BODY MASS INDEX: 28.92 KG/M2 | HEART RATE: 98 BPM | WEIGHT: 173.6 LBS | HEIGHT: 65 IN | RESPIRATION RATE: 18 BRPM

## 2019-06-03 DIAGNOSIS — R29.898 RIGHT LEG WEAKNESS: ICD-10-CM

## 2019-06-03 DIAGNOSIS — M79.651 RIGHT THIGH PAIN: ICD-10-CM

## 2019-06-03 DIAGNOSIS — S83.91XA SPRAIN OF RIGHT KNEE, UNSPECIFIED LIGAMENT, INITIAL ENCOUNTER: ICD-10-CM

## 2019-06-03 DIAGNOSIS — M54.16 LUMBAR RADICULOPATHY: Primary | ICD-10-CM

## 2019-06-03 DIAGNOSIS — Z91.81 AT HIGH RISK FOR INJURY RELATED TO FALL: ICD-10-CM

## 2019-06-03 DIAGNOSIS — R26.89 BALANCE PROBLEMS: ICD-10-CM

## 2019-06-03 DIAGNOSIS — S33.5XXS LUMBAR SPRAIN, SEQUELA: ICD-10-CM

## 2019-06-03 RX ORDER — HYDROCODONE BITARTRATE AND ACETAMINOPHEN 10; 325 MG/1; MG/1
1 TABLET ORAL
COMMUNITY

## 2019-06-03 RX ORDER — DULOXETIN HYDROCHLORIDE 60 MG/1
60 CAPSULE, DELAYED RELEASE ORAL
COMMUNITY
Start: 2018-07-02

## 2019-06-03 RX ORDER — TIZANIDINE 2 MG/1
4 TABLET ORAL
COMMUNITY

## 2019-06-03 NOTE — PROGRESS NOTES
Patient: Reanna Caicedo                MRN: [id number]       SSN: [id number]  YOB: 1950        AGE: 77 y.o. SEX: female    PCP: Chris Tolliver MD  06/03/19    CC:  Right leg weakness, right knee sprain, and lumbar pain    HISTORY:  Reanna Caicedo is a 77 y.o. female who is seen for left knee and low back pain. she had been falling more recently due to her back going out. She caught the 2nd step going down the steps at her house and she had fell forwards. She notes her leg wekness has been present sine her slip and fall work related back injury. She has increased right thigh and lower leg pains following her fall. She struck her right knee at the time of the fall. She notes weakness in her thighs and back. Sustained an abrasion on her right forearm from another fall at home. She has a h/o hyperparathyroidism and has since had a parathyroidectomy. She reports that her left knee pain began following her back surgery in September of 2015. She originally injured her back in February of 2015 when she slipped and fell on ice while at work. She has open litigations and has been seeing Melanie Shone. She notes that she previously underwent left knee surgery in 1989 for a fractured patella and torn meniscus. She states that she has increased knee pain and swelling since June of 2016. She is experiencing a popping sensation with certain motions while laying in bed. She notes that she previously underwent gastroplasty in 1980 by Dr. Sofi Nash in [place] and lost close to 100 pounds. She states that she also underwent abdominoplasty following her gastric surgery. She is currently taking Vicodin and is underwent recent lumbosacral fuson surgery by Dr. Lupillo Herring. She notes that she also underwent back surgery many years ago in [place]. She states that she was kicked by a horse in her left shin many years ago.       Pain Assessment  6/3/2019   Location of Pain Leg   Location Modifiers Right   Severity of Pain -   Quality of Pain Throbbing;Aching   Duration of Pain Persistent   Frequency of Pain Constant   Aggravating Factors Walking;Bending;Standing   Limiting Behavior Yes   Relieving Factors Rest   Result of Injury Yes   Work-Related Injury No   Type of Injury Fall     Occupation, etc:  Ms. Melinda Cuello is currently receiving workman's comp for her slip-and-fall episode on ice in February of 2015. She was previously an RN at [employer]. Her daughter is also a patient. She lives in [place] with her 5 dogs, 2 dachshund, one deer-head dachshund, and 2 dachshund-chihuahua mix. She states that she previously worked at the [employer] at [employer] [employer] many years ago. She returned from a trip to [place] and came back and was hospitalized for flu. Current weight is 177 pounds. Weight Metrics 6/3/2019 2/21/2019 3/3/2017 2/24/2017 2/20/2017 12/2/2016 11/17/2016   Weight 173 lb 9.6 oz 170 lb 175 lb 3.2 oz 175 lb 8 oz 177 lb 186 lb 1.1 oz 180 lb   BMI 28.89 kg/m2 27.45 kg/m2 28.28 kg/m2 28.33 kg/m2 28.57 kg/m2 30.03 kg/m2 28.62 kg/m2     Patient Active Problem List   Diagnosis Code    Arthritis of sacrum ([facility]) M46.98    Cough R05    Diarrhea R19.7    Hypokalemia E87.6    Lactic acidosis E87.2    Vomiting R11.10    Recent foreign travel Z78.9     REVIEW OF SYSTEMS: All Below are Negative except: See HPI   Constitutional: negative for fever, chills, and weight loss. Cardiovascular: negative for chest pain, claudication, leg swelling, SOB, WATERMAN   Gastrointestinal: Negative for pain, N/V/C/D, Blood in stool or urine, dysuria,  hematuria, incontinence, pelvic pain. Musculoskeletal: See HPI   Neurological: Negative for dizziness and weakness. Negative for headaches, Visual changes, confusion, seizures   Phychiatric/Behavioral: Negative for depression, memory loss, substance  abuse.     Extremities: Negative for hair changes, rash, or skin lesion changes. Hematologic: Negative for bleeding problems, bruising, pallor or swollen lymph  nodes   Peripheral Vascular: No calf pain, no circulation deficits. Social History     Socioeconomic History    Marital status:      Spouse name: Not on file    Number of children: Not on file    Years of education: Not on file    Highest education level: Not on file   Occupational History    Not on file   Social Needs    Financial resource strain: Not on file    Food insecurity:     Worry: Not on file     Inability: Not on file    Transportation needs:     Medical: Not on file     Non-medical: Not on file   Tobacco Use    Smoking status: Never Smoker    Smokeless tobacco: Never Used   Substance and Sexual Activity    Alcohol use: No    Drug use: No    Sexual activity: Not on file   Lifestyle    Physical activity:     Days per week: Not on file     Minutes per session: Not on file    Stress: Not on file   Relationships    Social connections:     Talks on phone: Not on file     Gets together: Not on file     Attends Cheondoism service: Not on file     Active member of club or organization: Not on file     Attends meetings of clubs or organizations: Not on file     Relationship status: Not on file    Intimate partner violence:     Fear of current or ex partner: Not on file     Emotionally abused: Not on file     Physically abused: Not on file     Forced sexual activity: Not on file   Other Topics Concern    Not on file   Social History Narrative    Not on file      No Known Allergies   Current Outpatient Medications   Medication Sig    DULoxetine (CYMBALTA) 60 mg capsule Take 60 mg by mouth.  tiZANidine (ZANAFLEX) 2 mg tablet Take 4 mg by mouth.  HYDROcodone-acetaminophen (NORCO)  mg tablet Take 1 Tab by mouth.  calcium-cholecalciferol, d3, (CALCIUM 600 + D) 600-125 mg-unit tab Take 1 Tab by mouth daily.     gabapentin (NEURONTIN) 300 mg capsule TAKE 1 CAPSULE BY MOUTH EVERY MORNING, 2 CAPSULES EVERY AFTERNOON, AND 3 CAPSULES AT NIGHT    melatonin tab tablet Take  by mouth nightly.  famotidine (PEPCID) 20 mg tablet Take 1 Tab by mouth two (2) times a day.  Lactobacillus Acidoph & Bulgar (FLORANEX) 1 million cell tab tablet Take 2 Tabs by mouth two (2) times a day.  amLODIPine (NORVASC) 2.5 mg tablet Take 2 Tabs by mouth daily.  PYRIDOXINE HCL (VITAMIN B-6 PO) Take 1 Tab by mouth daily.  citalopram (CELEXA) 40 mg tablet Take 40 mg by mouth daily.  alendronate (FOSAMAX) 70 mg tablet Take 70 mg by mouth every seven (7) days. No current facility-administered medications for this visit.        PHYSICAL EXAMINATION:  Visit Vitals  /82   Pulse 98   Temp 98.3 °F (36.8 °C) (Oral)   Resp 18   Ht 5' 5\" (1.651 m)   Wt 173 lb 9.6 oz (78.7 kg)   SpO2 99%   BMI 28.89 kg/m²     ORTHO EXAMINATION:  Examination Lumbar   Skin Intact   Tenderness +   Tightness +   Lordosis Normal   Kyphosis N/A   Scoliosis -   Flexion Fingertips to ankle   Extension 10   Knee reflexes Normal   Ankle reflexes Normal   Straight leg raise -   Calf tenderness -     Examination Right knee Left knee   Skin Intact Intact   Range of motion 120-0 120-0   Effusion - -   Medial joint line tenderness + +, anteromedial   Lateral joint line tenderness - -   Popliteal tenderness - -   Osteophytes palpable + +   Belles - -   Patella crepitus + +   Anterior drawer - -   Lateral laxity - -   Medial laxity - -   Varus deformity - -   Valgus deformity - -   Pretibial edema - -   Calf tenderness - -   Anteromedial swelling    Chart reviewed for the following:   Oliva Lundborg, MD, have reviewed the History, Physical and updated the Allergic reactions for [employer]     TIME OUT performed immediately prior to start of procedure:  Oliva Lundborg, MD, have performed the following reviews on [employer] prior to the start of the procedure:            * Patient was identified by name and date of birth   * Agreement on procedure being performed was verified  * Risks and Benefits explained to the patient  * Procedure site verified and marked as necessary  * Patient was positioned for comfort  * Consent was obtained     Time: 3:39 PM     Date of procedure: 6/3/2019    Procedure performed by:  Andie Herrera MD    Ms. Rojas tolerated the procedure well with no complications. RADIOGRAPHS:  XR RIGHT KNEE 5/14/19 [facility]  No acute bony abnormality identified. Degenerative joint space narrowing at medial compartment. Chondrocalcinosis consistent with CPPD. IMPRESSION:  Three views - No fractures, no effusion, moderate joint space narrowing, + osteophytes present. Kellgren Barry grade 2. Chondrocalcinosis c/w pseudogout     XR LEFT KNEE 9/29/16  IMPRESSION:  No fractures, no effusion, moderate joint space narrowing bilateral (L>R), no osteophytes present. IMPRESSION:      ICD-10-CM ICD-9-CM    1. Lumbar radiculopathy M54.16 724.4 REFERRAL TO PHYSICAL THERAPY      REFERRAL TO SPINE SURGERY   2. Right thigh pain M79.651 729.5 REFERRAL TO PHYSICAL THERAPY      REFERRAL TO SPINE SURGERY   3. Lumbar sprain, sequela S33. 5XXS 905.7 REFERRAL TO PHYSICAL THERAPY     847.2 REFERRAL TO SPINE SURGERY   4. Right leg weakness R29.898 729.89 REFERRAL TO PHYSICAL THERAPY      REFERRAL TO SPINE SURGERY   5. At high risk for injury related to fall Z91.81 V49.89 REFERRAL TO PHYSICAL THERAPY      REFERRAL TO SPINE SURGERY   6. Balance problems R26.89 781.99 REFERRAL TO PHYSICAL THERAPY      REFERRAL TO SPINE SURGERY   7. Sprain of right knee, unspecified ligament, initial encounter S83. 91XA 844.9 REFERRAL TO SPINE SURGERY     PLAN:  She will start a brief course of outpatient physical therapy. She will follow up as needed. She will follow up at the spine center.       Scribed by Timur Akins [address]) as dictated by Andie Herrera MD

## 2019-06-06 ENCOUNTER — HOSPITAL ENCOUNTER (OUTPATIENT)
Dept: PHYSICAL THERAPY | Age: 69
Discharge: HOME | End: 2019-06-06
Payer: MEDICARE

## 2019-06-06 PROCEDURE — 97161 PT EVAL LOW COMPLEX 20 MIN: CPT

## 2019-06-06 PROCEDURE — 97530 THERAPEUTIC ACTIVITIES: CPT

## 2019-06-06 NOTE — PROGRESS NOTES
PT DAILY TREATMENT NOTE/LUMBAR EVAL 10-18    Patient Name: Debbie Chanel  Date:2019  : 1950  [x]  Patient  Verified  Payor: [place] MEDICARE / Plan: [place] MEDICARE PART A & B / Product Type: Medicare /    In time: 12:04  Out time: 12:53  Total Treatment Time (min): 49  Visit #: 1 of     Medicare/BCBS Only   Total Timed Codes (min):  23 1:1 Treatment Time:  49     Treatment Area: Pain in right thigh [M79.651]  Radiculopathy, lumbar region [M54.16]  Other symptoms and signs involving the musculoskeletal system [R29.898]  Other abnormalities of gait and mobility [R26.89]  SUBJECTIVE  Pain Level (0-10 scale): 4/10  []constant []intermittent []improving []worsening []no change since onset    Any medication changes, allergies to medications, adverse drug reactions, diagnosis change, or new procedure performed?: [x] No    [] Yes (see summary sheet for update)  Subjective functional status/changes:     PLOF: mod Ind with all mobility, several steps to enter house, no falling. Limitations to PLOF:   Mechanism of Injury:   Current symptoms/Complaints: . Ms. Rossy Gaspar is a 78 y/o, F pt with CC of LBP, radicular symptoms with Right LE and multiple falls. Pt reports shooting pain along Right thigh, occasionally down to her calf. Pt had 2 fall during the last month, mostly due to weakness of right LE. Pt reports l/s fusion in  and recalls that current MRI shows no significant change compared to post surgery. Pt also reports having chronic bursitis. Previous Treatment/Compliance:   PMHx/Surgical Hx:   Work Hx:   Living Situation:   Pt Goals: \"strength\"  Barriers: []pain []financial []time []transportation []other  Motivation:   Substance use: []Alcohol []Tobacco []other:   FABQ Score: []low []elevate  Cognition: A & O x     Other:    OBJECTIVE/EXAMINATION  Domestic Life:   Activity/Recreational Limitations:   Mobility:   Self Care:     26 min [x]Eval                  []Re-Eval       23 min Therapeutic Activity:  [x]  See flow sheet Pt edu within scope of practice on prognosis, POC, l/s anatomy, modalities use, HEP review  :   Rationale: increase ROM, increase strength, improve coordination, improve balance and increase proprioception  to improve the patients ability to amb with ease and safety           With   [] TE   [] TA   [] neuro   [] other: Patient Education: [x] Review HEP    [] Progressed/Changed HEP based on:   [] positioning   [] body mechanics   [] transfers   [] heat/ice application    [] other:      Other Objective/Functional Measures:     Physical Therapy Evaluation - Lumbar Spine ([facility])    SUBJECTIVE  Chief Complaint:    Mechanism of injury:    Symptoms:  Aggravated by:   [] Bending [] Sitting [] Standing [x] Walking   [] Moving [] Cough [] Sneeze [] Valsalva   [] AM  [] PM  Lying:  [] sup   [] pro   [] sidelying   [] Other:     Eased by:  rest   [] Bending [] Sitting [] Standing [] Walking   [] Moving [] AM  [] PM  Lying: [] sup  [] pro  [] sidelying   [] Other:     General Health:  Red Flags Indicated? [] Yes    [] No  [] Yes [] No Recent weight change (If yes, due to dieting?  [] Yes  [] No)   [x] Yes [] No Weakness in legs during walking  [] Yes [] No Unremitting pain at night  [] Yes [] No Abdominal pain or problems  [] Yes [] No Rectal bleeding  [] Yes [] No Feet more cold or painful in cold weather  [] Yes [] No Menstrual irregularities  [] Yes [] No Blood or pain with urination  [x] Yes [] No Dysfunction of bowel or bladder  [] Yes [] No Recent illness within past 3 weeks (i.e, cold, flu)  [] Yes [] No Numbness/tingling in buttock/genitalia region    Past History/Treatments:     Diagnostic Tests: [] Lab work [] X-rays    [] CT [] MRI     [] Other:  Results:    Functional Status  Prior level of function:  Present functional limitations:  What position do you sleep in?: sidelying     OBJECTIVE  Posture:  Lateral Shift: [] R    [] L     [] +  [] -  Kyphosis: [] Increased [] Decreased   [] WNL  Lordosis:  [] Increased [x] Decreased   [] WNL  Pelvic symmetry: [] WNL    [] Other: left on left sacral rotation    Gait: mod antalgic, Trendelenburg    [] Normal     [] Abnormal:    Active Movements: [] N/A   [] Too acute   [] Other:  ROM % AROM % PROM Comments:pain, area   Forward flexion 40-60 50%     Extension 20-30 25%     SB right 20-30      SB left 20-30      Rotation right 5-10 25%     Rotation left 5-10 25%       Repeated Movements   Effects on present pain: produces (OR), abolishes (A), increases (incr), decreases (decr), centralizes (C), peripheral (PH), no effect (NE)   Pre-Test Sx Flexion Repeated Flexion Extension Repeated Extension Repeated SBL Repeated SBR   Sitting          Standing          Lying      N/A N/A   Comments:  Side Glide:  Sustained passive positioning test:    Neuro Screen [] WNL  Myotome/Dermatome/Reflexes:  Comments: decreased light touch with upper leg of Right side anb B sole    Dural Mobility:  SLR Sitting: [x] R    [] L    [x] + increased burning pain along Right thigh    [] -  @ (degrees):           Supine: [] R    [] L    [] +    [] -  @ (degrees):   Slump Test: [] R    [] L    [] +    [] -  @ (degrees):   Prone Knee Bend: [] R    [] L    [] +    [] -     Palpation  [] Min  [x] Mod  [x] Severe    Location: pain with deep palpation along piriformis, glute med and max and lat quad of Right LE  [] Min  [] Mod  [] Severe    Location:  [] Min  [] Mod  [] Severe    Location:    Strength   L(0-5) R (0-5) N/T   Hip Flexion (L1,2) 3 3- []   Knee Extension (L3,4) 3- 3+ []   Ankle Dorsiflexion (L4) 3 3 []   Great Toe Extension (L5) 3+ 3 []   Ankle Plantarflexion (S1) ~3- ~3- []   Knee Flexion (S1,2) 3+ 3 []   Upper Abdominals   []   Lower Abdominals   []   Paraspinals   []   Back Rotators   []   Gluteus Jassi and Med 3 2+ []   Other   []     Special Tests  Lumbar:  Lumb.  Compression: [] Pos  [] Neg               Lumbar Distraction:   [] Pos  [] Neg    Quadrant:  [] Pos  [] Neg [] Flex  [] Ext    Sacroilliac:  Gaenslen's: [] R    [] L    [] +    [] -     Compression: [] +    [] -     Gapping:  [] +    [] -     Thigh Thrust: [] R    [] L    [] +    [] -     Leg Length: [] +    [] -   Position:    Crests:    ASIS:    PSIS:    Sacral Sulcus:    Mobility: Standing flex:     Sitting flex:     Supine to sit:     Prone knee bend:         Hip: Isabela Mckeon:  [] R    [] L    [] +    [] -     Scour:  [] R    [] L    [] +    [] -     Piriformis: [x] R    [x] L    [] +    [x] -          Deficits: Tor's: [] R    [] L    [] +    [] -     Sam: [x] R    [] L    [x] +    [] -     Hamstrings 90/90:    Gastrocsoleus (to neutral): Right: Left:       Global Muscular Weakness:  Abdominals:  Quadratus Lumborum:  Paraspinals: Other:    Other tests/comments:       Pain Level (0-10 scale) post treatment: 3/10    ASSESSMENT/Changes in Function: see POC    Patient will continue to benefit from skilled PT services to modify and progress therapeutic interventions, address functional mobility deficits, address ROM deficits, address strength deficits, analyze and address soft tissue restrictions, analyze and cue movement patterns, analyze and modify body mechanics/ergonomics, assess and modify postural abnormalities, address imbalance/dizziness and instruct in home and community integration to attain remaining goals.      [x]  See Plan of Care  []  See progress note/recertification  []  See Discharge Summary         Progress towards goals / Updated goals:  See POC    PLAN  [x]  Upgrade activities as tolerated     [x]  Continue plan of care  []  Update interventions per flow sheet       []  Discharge due to:_  []  Other:_    Robbie Tapia, PT 6/6/2019  12:06 PM

## 2019-06-06 NOTE — PROGRESS NOTES
[facility]  [facility] [employer] [facility]  [facility]  [address] [place] [address]  [phone] fax    Plan of Care/ Statement of Necessity for Physical Therapy Services    Patient name: Emperatriz Carpio Start of Care: 2019   Referral source: Edna Hay MD : 1950    Medical Diagnosis: Pain in right thigh [M79.651]  Radiculopathy, lumbar region [M54.16]  Other symptoms and signs involving the musculoskeletal system [R29.898]  Other abnormalities of gait and mobility [R26.89]  Payor: VA MEDICARE / Plan: VA MEDICARE PART A & B / Product Type: Medicare /  Onset Date: wrosening about 1 month    Treatment Diagnosis: LBP, radiating pain with Right LE and impaired balance   Prior Hospitalization: see medical history Provider#: [id number]   Medications: Verified on Patient summary List    Comorbidities: osteoporosis, back surgery (l/s fusion) in    Prior Level of Function: mod Ind with all mobility, several steps to enter house, no falling. The Plan of Care and following information is based on the information from the initial evaluation. Assessment/ meléndez information: Ms. Skyler Stephen is a 78 y/o, F pt with CC of LBP, radicular symptoms with Right LE and multiple falls. Pt reports shooting pain along Right thigh, occasionally down to her calf. Pt had 2 fall during the last month, mostly due to weakness of right LE. Pt reports l/s fusion in 2015 and recalls that current MRI shows no significant change compared to post surgery. Pt also reports having chronic bursitis. Pt present with mod decreased AROM of trunk in all direction, poor strength of core B LEs, worst Right hip flex, post chain of B LEs and B ankles. No radiating pain to calf with SLR but increased burning in Right thigh and pain with Right SJ joint. Left on left sacral rotations noticed. Pt also demonstrates decreased light touch with Right upper leg and B soles.  However, she demonstrates good balance with Romberg and EC on firm floor. Pt would benefit from skilled PT to address these deficits above for more comfortable and safer functional mobility. Evaluation Complexity History MEDIUM  Complexity : 1-2 comorbidities / personal factors will impact the outcome/ POC ; Examination MEDIUM Complexity : 3 Standardized tests and measures addressing body structure, function, activity limitation and / or participation in recreation  ;Presentation LOW Complexity : Stable, uncomplicated  ;Clinical Decision Making MEDIUM Complexity : FOTO score of 26-74  Overall Complexity Rating: LOW   Problem List: pain affecting function, decrease ROM, decrease strength, edema affecting function, impaired gait/ balance, decrease ADL/ functional abilitiies, decrease activity tolerance, decrease flexibility/ joint mobility and decrease transfer abilities   Treatment Plan may include any combination of the following: Therapeutic exercise, Therapeutic activities, Neuromuscular re-education, Physical agent/modality, Gait/balance training, Manual therapy, Patient education, Self Care training, Functional mobility training, Home safety training and Stair training  Patient / Family readiness to learn indicated by: asking questions, trying to perform skills and interest  Persons(s) to be included in education: patient (P)  Barriers to Learning/Limitations: None  Patient Goal (s): strength  Patient Self Reported Health Status: good  Rehabilitation Potential: good    Short term goals: To be accomplished within 1 week  1. Pt will be independent with HEP to maintain progression. Long term goals: To be accomplished within 8 weeks  1. Pt will improve FOTO score by 8 points to show improvement with functional mobility performance. 2. Pt will report no more than 1-2/10 pain at worst to improve QOL. 3. Pt will improve B LEs strength at least 4-/5 to improve ease and safety with amb. 4. Pt will reports no falling within 30 days.     Frequency / Duration: Patient to be seen 2-3 times per week for 8 weeks. Patient/ Caregiver education and instruction: Diagnosis, prognosis, self care, activity modification, brace/ splint application and exercises   [x]  Plan of care has been reviewed with PTA    Certification Period: 6-6-2019 to 8-5-2019    Bola Deepfamilia, PT 6/6/2019 1:08 PM    ________________________________________________________________________    I certify that the above Therapy Services are being furnished while the patient is under my care. I agree with the treatment plan and certify that this therapy is necessary.     Physician's Signature:____________Date:_________TIME:________    ** Signature, Date and Time must be completed for valid certification **    Please sign and return to [facility]  Lizz Velázquez  [address] [facility]  [phone] fax

## 2019-06-07 ENCOUNTER — HOSPITAL ENCOUNTER (OUTPATIENT)
Dept: PHYSICAL THERAPY | Age: 69
Discharge: HOME | End: 2019-06-07
Payer: MEDICARE

## 2019-06-07 PROCEDURE — 97110 THERAPEUTIC EXERCISES: CPT

## 2019-06-07 PROCEDURE — 97140 MANUAL THERAPY 1/> REGIONS: CPT

## 2019-06-07 NOTE — PROGRESS NOTES
PT DAILY TREATMENT NOTE 10-18    Patient Name: Monica Julien  Date:2019  : 1950  [x]  Patient  Verified  Payor: Tracy Montalvo / Plan: [place] MEDICARE PART A & B / Product Type: Medicare /    In time: 3:00  Out time:4:00  Total Treatment Time (min): 60  Visit #: 2 of     Medicare/BCBS Only   Total Timed Codes (min):  45 1:1 Treatment Time:  30       Treatment Area: Pain in right thigh [M79.651]  Radiculopathy, lumbar region [M54.16]  Other symptoms and signs involving the musculoskeletal system [R29.898]  Other abnormalities of gait and mobility [R26.89]    SUBJECTIVE  Pain Level (0-10 scale): 4/10  Any medication changes, allergies to medications, adverse drug reactions, diagnosis change, or new procedure performed?: [x] No    [] Yes (see summary sheet for update)  Subjective functional status/changes:   [] No changes reported  Pt reports mod soreness along glute and ITB    OBJECTIVE    Modality rationale: decrease pain and increase tissue extensibility to improve the patients ability to tolerate ADLs/amb   Min Type Additional Details    [] Estim:  []Unatt       []IFC  []Premod                        []Other:  []w/ice   []w/heat  Position:  Location:    [] Estim: []Att    []TENS instruct  []NMES                    []Other:  []w/US   []w/ice   []w/heat  Position:  Location:    []  Traction: [] Cervical       []Lumbar                       [] Prone          []Supine                       []Intermittent   []Continuous Lbs:  [] before manual  [] after manual    []  Ultrasound: []Continuous   [] Pulsed                           []1MHz   []3MHz W/cm2:  Location:    []  Iontophoresis with dexamethasone         Location: [] Take home patch   [] In clinic   15 []  Ice     [x]  heat  []  Ice massage  []  Laser   []  Anodyne Position: long sitting  Location: back and Right thigh    []  Laser with stim  []  Other:  Position:  Location:    []  Vasopneumatic Device Pressure:       [] lo [] med [] hi Temperature: [] lo [] med [] hi   [] Skin assessment post-treatment:  []intact []redness- no adverse reaction    []redness  adverse reaction:     37 min Therapeutic Exercise:  [x] See flow sheet :   Rationale: increase ROM, increase strength, improve coordination, improve balance and increase proprioception to improve the patients ability to amb and perform ADLs with ease    8 min Manual Therapy:  Rolling pin for Right quad and ITB   Rationale: decrease pain, increase ROM, increase tissue extensibility and decrease trigger points to improve ease with ADLs/amb     min Gait Training:  ___ feet with ___ device on level surfaces with ___ level of assist   Rationale: With   [] TE   [] TA   [] neuro   [] other: Patient Education: [x] Review HEP    [] Progressed/Changed HEP based on:   [] positioning   [] body mechanics   [] transfers   [] heat/ice application    [] other:      Other Objective/Functional Measures:    No change of radicular symptoms with Left trunk ext and lat flex Mod soreness with manual   Took extra time for all therex    Pain Level (0-10 scale) post treatment: 4/10    ASSESSMENT/Changes in Function: Initiated treatment as POC, pt demonstrated fair tolerance with mod fatigue and min pain. Will cont to progress stretching and strengthening therex as tolerated. Patient will continue to benefit from skilled PT services to modify and progress therapeutic interventions, address functional mobility deficits, address ROM deficits, address strength deficits, analyze and address soft tissue restrictions, analyze and cue movement patterns, analyze and modify body mechanics/ergonomics, assess and modify postural abnormalities, address imbalance/dizziness and instruct in home and community integration to attain remaining goals. [x]  See Plan of Care  []  See progress note/recertification  []  See Discharge Summary         Progress towards goals / Updated goals:  Short term goals:  To be accomplished within 1 week  1. Pt will be independent with HEP to maintain progression. Met 6-7-19     Long term goals: To be accomplished within 8 weeks  1. Pt will improve FOTO score by 8 points to show improvement with functional mobility performance. 2. Pt will report no more than 1-2/10 pain at worst to improve QOL. 3. Pt will improve B LEs strength at least 4-/5 to improve ease and safety with amb. 4. Pt will reports no falling within 30 days.     PLAN  [x]  Upgrade activities as tolerated     [x]  Continue plan of care  []  Update interventions per flow sheet       []  Discharge due to:_  []  Other:_      Lloyd Knott, PT 6/7/2019  9:53 AM    Future Appointments   Date Time Provider Noy Christianson   6/7/2019  3:00 PM Misael [place] FFZEUSK [facility]   6/10/2019 12:30 PM Elfredia Yc, PTA MMCPTPB [facility]   6/13/2019 12:30 PM Misael [place] GRWBAET [facility]   6/14/2019 12:00 PM Elfredia Cy, PTA MMCPTPB [facility]   6/17/2019 11:00 AM Shaggy Sheridan, PT NEVEVHB [facility]   6/19/2019 12:00 PM Elfredia Cy, PTA MMCPTPB [facility]   6/21/2019 12:00 PM Misael [place] PCGOIHA [facility]   6/24/2019 12:00 PM Shaggy Sheridan, PT MMCPTPB [facility]   6/26/2019 12:00 PM Elfredia Cy, PTA MMCPTPB [facility]   6/28/2019 12:00 PM Misael [place] NVCEXWS [facility]   7/1/2019 12:30 PM Shaggy Sheridan, PT MMCPTPB [facility]   7/3/2019 12:00 PM Elfredia Cy, PTA MMCPTPB [facility]   7/5/2019 12:00 PM Misael DOEDAKENNEY [facility]   7/8/2019 12:00 PM Shaggy Sheridan, PT MMCPTPB [facility]   7/9/2019  3:00 PM Yumiko [place]  [address]

## 2019-06-10 ENCOUNTER — HOSPITAL ENCOUNTER (OUTPATIENT)
Dept: PHYSICAL THERAPY | Age: 69
Discharge: HOME | End: 2019-06-10
Payer: MEDICARE

## 2019-06-10 PROCEDURE — 97140 MANUAL THERAPY 1/> REGIONS: CPT

## 2019-06-10 PROCEDURE — 97110 THERAPEUTIC EXERCISES: CPT

## 2019-06-10 NOTE — PROGRESS NOTES
PT DAILY TREATMENT NOTE 10-18    Patient Name: Gustavo Fung  Date:6/10/2019  : 1950  [x]  Patient  Verified  Payor: Roberta Wallis / Plan: [place] MED[place] PART [place] & B / Product Type: Medicare /    In time: 12:30 Out time: 1:10  Total Treatment Time (min):  40  Visit #: 3 of -    Medicare/BCBS Only   Total Timed Codes (min):  30 1:1 Treatment Time:  23       Treatment Area: Pain in right thigh [M79.651]  Radiculopathy, lumbar region [M54.16]  Other symptoms and signs involving the musculoskeletal system [R29.898]  Other abnormalities of gait and mobility [R26.89]    SUBJECTIVE  Pain Level (0-10 scale): 5/10 back; 3/10 leg  Any medication changes, allergies to medications, adverse drug reactions, diagnosis change, or new procedure performed?: [x] No    [] Yes (see summary sheet for update)  Subjective functional status/changes:   [] No changes reported  Pt reports the pain is constant in the front of her right thigh. She has had multiple falls due to the leg giving way. She continues to have 5 dogs to care for. She states the last imaging shows no change in her back but she feels all the falls have effected it.      OBJECTIVE    Modality rationale: decrease pain and increase tissue extensibility to improve the patients ability to tolerate ADLs/amb   Min Type Additional Details    [] Estim:  []Unatt       []IFC  []Premod                        []Other:  []w/ice   []w/heat  Position:  Location:    [] Estim: []Att    []TENS instruct  []NMES                    []Other:  []w/US   []w/ice   []w/heat  Position:  Location:    []  Traction: [] Cervical       []Lumbar                       [] Prone          []Supine                       []Intermittent   []Continuous Lbs:  [] before manual  [] after manual    []  Ultrasound: []Continuous   [] Pulsed                           []1MHz   []3MHz W/cm2:  Location:    []  Iontophoresis with dexamethasone         Location: [] Take home patch   [] In clinic   10 []  Ice [x]  heat  []  Ice massage  []  Laser   []  Anodyne Position: sitting  Location: back and Right thigh    []  Laser with stim  []  Other:  Position:  Location:    []  Vasopneumatic Device Pressure:       [] lo [] med [] hi   Temperature: [] lo [] med [] hi   [] Skin assessment post-treatment:  []intact []redness- no adverse reaction    []redness  adverse reaction:     20 min Therapeutic Exercise:  [x] See flow sheet :   Rationale: increase ROM, increase strength, improve coordination, improve balance and increase proprioception to improve the patients ability to amb and perform ADLs with ease    10 min Manual Therapy: right upslip; right AI; MET to correct both and shotgun technique     Rationale: decrease pain, increase ROM, increase tissue extensibility and decrease trigger points to improve ease with ADLs/amb            With   [] TE   [] TA   [] neuro   [] other: Patient Education: [x] Review HEP    [] Progressed/Changed HEP based on:   [] positioning   [] body mechanics   [] transfers   [] heat/ice application    [] other:      Other Objective/Functional Measures:   Significant decrease in right glute strength  Compensated trendelenburg to the right during gait  Tight right quadratus lumborum  No change in radicular symptoms during session    Pain Level (0-10 scale) post treatment: 2/10    ASSESSMENT/Changes in Function: Pt is making slow progress with continued radicular symptoms along the L3-L4 dermatome. She hasn't had any recent falls but continues to demonstrate decreased right glute strength with compensated trendelenburg gait. Will continue to progress core and hip strengthening for stability and centralization of symptoms for ease of ambulation and caring for her 5 dogs. Progress towards goals / Updated goals:  Short term goals: To be accomplished within 1 week  1. Pt will be independent with HEP to maintain progression. Met 6-7-19     Long term goals:  To be accomplished within 8 weeks  1. Pt will improve FOTO score by 8 points to show improvement with functional mobility performance. 2. Pt will report no more than 1-2/10 pain at worst to improve QOL. 3. Pt will improve B LEs strength at least 4-/5 to improve ease and safety with amb. 4. Pt will reports no falling within 30 days.     PLAN  [x]  Upgrade activities as tolerated     [x]  Continue plan of care  []  Update interventions per flow sheet       []  Discharge due to:_  []  Other:_      Peggy Jack PTA, PT 6/10/2019  9:53 AM    Future Appointments   Date Time Provider Noy Christianson   6/10/2019 12:30 PM Jose M Zavala PTA MMCPTPB [facility]   6/13/2019 12:30 PM Rajinder Pulido [id number] [facility]   6/14/2019 12:00 PM Jose M Zavala PTA [facility]   6/17/2019 11:00 AM Luly Busby, PT VERGLRM [facility]   6/19/2019 12:00 PM Jose M Zavala PTA [facility]   6/21/2019 12:00 PM Rajinder Pulido [id number] [facility]   6/24/2019 12:00 PM Luly Busby PT [facility]CP[facility]   6/26/2019 12:00 PM Jose M Zavala PTA [facility]   6/28/2019 12:00 PM Rajinder Pulido [id number] [facility]   7/1/2019 12:30 PM Luly Busby PT [facility]CP[facility]   7/3/2019 12:00 PM Jose M Zavala PTA [facility]   7/5/2019 12:00 PM Rajinder Man HSTREDN [facility]   7/8/2019 12:00 PM Luly Busby PT [facility]   7/9/2019  3:00 PM Rios Fuller  [address]

## 2019-06-13 ENCOUNTER — HOSPITAL ENCOUNTER (OUTPATIENT)
Dept: PHYSICAL THERAPY | Age: 69
Discharge: HOME | End: 2019-06-13
Payer: MEDICARE

## 2019-06-13 PROCEDURE — 97110 THERAPEUTIC EXERCISES: CPT

## 2019-06-13 PROCEDURE — 97140 MANUAL THERAPY 1/> REGIONS: CPT

## 2019-06-13 NOTE — PROGRESS NOTES
PT DAILY TREATMENT NOTE 10-18    Patient Name: Ivan Lewis  Date:2019  : 1950  [x]  Patient  Verified  Payor: [place] MEDICARE / Plan: [place] MEDICARE PART A & B / Product Type: Medicare /    In time: 12:23  Out time:1:30  Total Treatment Time (min): 53  Visit #: 4 of     Medicare/BCBS Only   Total Timed Codes (min):  43 1:1 Treatment Time:  40       Treatment Area: Pain in right thigh [M79.651]  Radiculopathy, lumbar region [M54.16]  Other symptoms and signs involving the musculoskeletal system [R29.898]  Other abnormalities of gait and mobility [R26.89]    SUBJECTIVE  Pain Level (0-10 scale): 5/10  Any medication changes, allergies to medications, adverse drug reactions, diagnosis change, or new procedure performed?: [x] No    [] Yes (see summary sheet for update)  Subjective functional status/changes:   [] No changes reported  Pt reports pain locates mostly at Left SI and ant thigh. She also having mod pain with her neck after changing her pillow.      OBJECTIVE    Modality rationale: decrease pain and increase tissue extensibility to improve the patients ability to tolerate ADLs/amb   Min Type Additional Details      [] Estim:  []Unatt       []IFC  []Premod                        []Other:  []w/ice   []w/heat  Position:  Location:      [] Estim: []Att    []TENS instruct  []NMES                    []Other:  []w/US   []w/ice   []w/heat  Position:  Location:      []  Traction: [] Cervical       []Lumbar                       [] Prone          []Supine                       []Intermittent   []Continuous Lbs:  [] before manual  [] after manual      []  Ultrasound: []Continuous   [] Pulsed                           []1MHz   []3MHz W/cm2:  Location:      []  Iontophoresis with dexamethasone         Location: [] Take home patch   [] In clinic    10 []  Ice     [x]  heat  []  Ice massage  []  Laser   []  Anodyne Position: sitting  Location: back and Right thigh      []  Laser with stim  []  Other: Position:  Location:      []  Vasopneumatic Device Pressure:       [] lo [] med [] hi   Temperature: [] lo [] med [] hi    [] Skin assessment post-treatment:  []intact []redness- no adverse reaction    []redness  adverse reaction:      35 min Therapeutic Exercise:  [x] See flow sheet :   Rationale: increase ROM, increase strength, improve coordination, improve balance and increase proprioception to improve the patients ability to amb and perform ADLs with ease     8 min Manual Therapy: right upslip; right AI; MET to correct both and shotgun technique      Rationale: decrease pain, increase ROM, increase tissue extensibility and decrease trigger points to improve ease with ADLs/amb            With   [] TE   [] TA   [] neuro   [] other: Patient Education: [x] Review HEP    [] Progressed/Changed HEP based on:   [] positioning   [] body mechanics   [] transfers   [] heat/ice application    [] other:      Other Objective/Functional Measures:    Less obliquity noticed today but more pain with back during correction, tolerated modified hip ext for correction better    Poor activation of glut with DK, improved with glute #3   Good form with minisquat    Pain Level (0-10 scale) post treatment: 4/10    ASSESSMENT/Changes in Function: pt making slow progress, cont to fluctuate with pain. However, she demonstrated improved tolerance for strengthening therex. Will progress as tolerated next visit. Patient will continue to benefit from skilled PT services to modify and progress therapeutic interventions, address functional mobility deficits, address ROM deficits, address strength deficits, analyze and address soft tissue restrictions, analyze and cue movement patterns, analyze and modify body mechanics/ergonomics, assess and modify postural abnormalities, address imbalance/dizziness and instruct in home and community integration to attain remaining goals.      [x]  See Plan of Care  []  See progress note/recertification  [] See Discharge Summary                Progress towards goals / Updated goals:  Short term goals: To be accomplished within 1 week  1. Pt will be independent with HEP to maintain progression. Met 6-7-19     Long term goals: To be accomplished within 8 weeks  1. Pt will improve FOTO score by 8 points to show improvement with functional mobility performance.    2. Pt will report no more than 1-2/10 pain at worst to improve QOL. Cont to fluctuate in mod range 6-13-19  3. Pt will improve B LEs strength at least 4-/5 to improve ease and safety with amb.   4. Pt will reports no falling within 30 days.      PLAN  [x]  Upgrade activities as tolerated     [x]  Continue plan of care  []  Update interventions per flow sheet       []  Discharge due to:_  []  Other:_      Robbie Tapia PT 6/13/2019  9:48 AM    Future Appointments   Date Time Provider Noy Christianson   6/13/2019 12:30 PM Johanne Flowers [id number] [facility]   6/14/2019 12:00 PM Meagan Patient, PTA [facility]CPTPB [facility]   6/17/2019  8:00 AM Analisa Spar A [facility]CPTPB [facility]   6/26/2019 12:00 PM Meagan Patient, PTA [facility]   6/28/2019 12:00 PM Johanne Flowers [id number] [facility]   7/1/2019 12:30 PM Arelis Henry, PT [facility]CPTPB [facility]   7/3/2019 12:00 PM Meagan Patient, PTA [facility]   7/5/2019 12:00 PM Johanne Flowers [id number] [facility]   7/8/2019 12:00 PM Arelis Henry, PT [facility]CPTPB [facility]   7/9/2019  3:00 PM Consuelo Christensen  [address]

## 2019-06-14 ENCOUNTER — HOSPITAL ENCOUNTER (OUTPATIENT)
Dept: PHYSICAL THERAPY | Age: 69
Discharge: HOME | End: 2019-06-14
Payer: MEDICARE

## 2019-06-14 PROCEDURE — 97110 THERAPEUTIC EXERCISES: CPT

## 2019-06-14 NOTE — PROGRESS NOTES
PT DAILY TREATMENT NOTE 10-18    Patient Name: Loreta Wallace  Date:2019  : 1950  [x]  Patient  Verified  Payor: [place] MEDICARE / Plan: [place] MEDICARE PART A & B / Product Type: Medicare /    In time: 12:01  Out time:12:33  Total Treatment Time (min): 32  Visit #: 5 of     Medicare/BCBS Only   Total Timed Codes (min):  32 1:1 Treatment Time:  25       Treatment Area: Pain in right thigh [M79.651]  Radiculopathy, lumbar region [M54.16]  Other symptoms and signs involving the musculoskeletal system [R29.898]  Other abnormalities of gait and mobility [R26.89]    SUBJECTIVE  Pain Level (0-10 scale): 4/10  Any medication changes, allergies to medications, adverse drug reactions, diagnosis change, or new procedure performed?: [x] No    [] Yes (see summary sheet for update)  Subjective functional status/changes:   [] No changes reported  Pt reports continued pain in her buttocks and low back with numbness down her right thigh. She feels like she needs strength in her hips. OBJECTIVE      35 min Therapeutic Exercise:  [x] See flow sheet :   Rationale: increase ROM, increase strength, improve coordination, improve balance and increase proprioception to improve the patients ability to amb and perform ADLs with ease             With   [] TE   [] TA   [] neuro   [] other: Patient Education: [x] Review HEP    [] Progressed/Changed HEP based on:   [] positioning   [] body mechanics   [] transfers   [] heat/ice application    [] other:      Other Objective/Functional Measures: Focused on correct form with hip strengthening exercises  Significant glute weakness bilaterally  Slight increase in pain by end of session but expect to be from fatigue    Pain Level (0-10 scale) post treatment: 5/10    ASSESSMENT/Changes in Function: Pt is making slow progress towards initial goals with continued moderate pain levels. She continues with back pain and numbness in her right thigh.  She has significant glute and core weakness contributing to instability and pelvic obliquity. Will continue working on strengthening to improve stability for decreased pain and better balance for decreased fall risk. Progress towards goals / Updated goals:  Short term goals: To be accomplished within 1 week  1. Pt will be independent with HEP to maintain progression. Met 6-7-19     Long term goals: To be accomplished within 8 weeks  1. Pt will improve FOTO score by 8 points to show improvement with functional mobility performance.    2. Pt will report no more than 1-2/10 pain at worst to improve QOL. Cont to fluctuate in mod range 6-13-19  3. Pt will improve B LEs strength at least 4-/5 to improve ease and safety with amb.   4. Pt will reports no falling within 30 days.      PLAN  [x]  Upgrade activities as tolerated     [x]  Continue plan of care  []  Update interventions per flow sheet       []  Discharge due to:_  []  Other:_      Arvel Bound, ROB, PT 6/14/2019  9:48 AM    Future Appointments   Date Time Provider Noy Christianson   6/17/2019  8:00 AM Black Hidalgo MMCPTPB [facility]   6/27/2019 12:30 PM Jennifer Quintanilla PTA [facility]   6/28/2019 12:00 PM Florian Gan [id number] [facility]   7/1/2019 12:30 PM Soto Mariano PT [facility]CPTPB [facility]   7/3/2019 12:00 PM Jennifer Quintanilla PTA [facility]   7/5/2019 12:00 PM Florian Gan [id number] [facility]   7/8/2019 12:00 PM Soto Mariano PT [facility]CPTPB [facility]   7/9/2019  3:00 PM Kisha Avery MD [facility]

## 2019-06-17 ENCOUNTER — HOSPITAL ENCOUNTER (OUTPATIENT)
Dept: PHYSICAL THERAPY | Age: 69
Discharge: HOME | End: 2019-06-17
Payer: MEDICARE

## 2019-06-17 PROCEDURE — 97110 THERAPEUTIC EXERCISES: CPT

## 2019-06-17 PROCEDURE — 97140 MANUAL THERAPY 1/> REGIONS: CPT

## 2019-06-17 NOTE — PROGRESS NOTES
PT DAILY TREATMENT NOTE 10-18    Patient Name: Sadaf Lock  Date:2019  : 1950  [x]  Patient  Verified  Payor: Taty Banerjee / Plan: [place] MEDICARE PART A & B / Product Type: Medicare /    In time: 8:00  Out time: 8:45  Total Treatment Time (min): 45  Visit #: 5 of     Medicare/BCBS Only   Total Timed Codes (min):  45 1:1 Treatment Time:  45       Treatment Area: Pain in right thigh [M79.651]  Radiculopathy, lumbar region [M54.16]  Other symptoms and signs involving the musculoskeletal system [R29.898]  Other abnormalities of gait and mobility [R26.89]    SUBJECTIVE  Pain Level (0-10 scale): 4/10  Any medication changes, allergies to medications, adverse drug reactions, diagnosis change, or new procedure performed?: [x] No    [] Yes (see summary sheet for update)  Subjective functional status/changes:   [] No changes reported  Pt reports right sided back pain with radicular sx in right thigh. She reports she is going to [facility] today.     OBJECTIVE           Modality rationale: decrease pain and increase tissue extensibility to improve the patients ability to tolerate ADLs/amb   Min Type Additional Details      [] Estim:  []Unatt       []IFC  []Premod                        []Other:  []w/ice   []w/heat  Position:  Location:      [] Estim: []Att    []TENS instruct  []NMES                    []Other:  []w/US   []w/ice   []w/heat  Position:  Location:      []  Traction: [] Cervical       []Lumbar                       [] Prone          []Supine                       []Intermittent   []Continuous Lbs:  [] before manual  [] after manual      []  Ultrasound: []Continuous   [] Pulsed                           []1MHz   []3MHz W/cm2:  Location:      []  Iontophoresis with dexamethasone         Location: [] Take home patch   [] In clinic    PD []  Ice     [x]  heat  []  Ice massage  []  Laser   []  Anodyne Position: sitting  Location: back and Right thigh      []  Laser with stim  []  Other: Position:  Location:      []  Vasopneumatic Device Pressure:       [] lo [] med [] hi   Temperature: [] lo [] med [] hi    [x] Skin assessment post-treatment:  [x]intact []redness- no adverse reaction    []redness  adverse reaction:      30 min Therapeutic Exercise:  [x] See flow sheet :   Rationale: increase ROM, increase strength, improve coordination, improve balance and increase proprioception to improve the patients ability to amb and perform ADLs with ease     15 min Manual Therapy: right upslip; right AI; MET to correct both and shotgun technique, left on left sacral rotation       Rationale: decrease pain, increase ROM, increase tissue extensibility and decrease trigger points to improve ease with ADLs/amb            With   [] TE   [] TA   [] neuro   [] other: Patient Education: [x] Review HEP    [] Progressed/Changed HEP based on:   [] positioning   [] body mechanics   [] transfers   [] heat/ice application    [] other:      Other Objective/Functional Measures:   - Focus on right LE strengthening to work on keeping patient in [address] with prone right hip ext and donkey kicks  -  assisted with correction of sacral torsion    Pain Level (0-10 scale) post treatment: 5-6/10    ASSESSMENT/Changes in Function:   Patient presents with moderate pelvic obliquity today requiring a few rounds of MET to return to alignment. Patient is limited by right LE weakness, focus on right LE strengthening to help with maintaining pelvic alignment. Good effort by patient with minimal increase in pain at end of session.     Patient will continue to benefit from skilled PT services to modify and progress therapeutic interventions, address functional mobility deficits, address ROM deficits, address strength deficits, analyze and address soft tissue restrictions, analyze and cue movement patterns, analyze and modify body mechanics/ergonomics, assess and modify postural abnormalities, address imbalance/dizziness and instruct in home and community integration to attain remaining goals. [x]  See Plan of Care  []  See progress note/recertification  []  See Discharge Summary                Progress towards goals / Updated goals:   Short term goals: To be accomplished within 1 week  1. Pt will be independent with HEP to maintain progression. Met 6-7-19     Long term goals: To be accomplished within 8 weeks  1. Pt will improve FOTO score by 8 points to show improvement with functional mobility performance.    2. Pt will report no more than 1-2/10 pain at worst to improve QOL. Cont to fluctuate in mod range 6-13-19  3. Pt will improve B LEs strength at least 4-/5 to improve ease and safety with amb. 4. Pt will reports no falling within 30 days. Current: MET, No falls for about 5 weeks.  (6/17/19)     PLAN  [x]  Upgrade activities as tolerated     [x]  Continue plan of care  []  Update interventions per flow sheet       []  Discharge due to:_  []  Other:_      GABINO Tate 6/17/2019  8:45 AM    Future Appointments   Date Time Provider Noy Christianson   6/27/2019 12:30 PM Barby Smith PTA [facility]   6/28/2019 12:00 PM Jose M Dalal [id number] [facility]   7/1/2019 12:30 PM Dearlacie Torres PT [facility]CPTPB [facility]   7/3/2019 12:00 PM Barby Smith PTA [facility]   7/5/2019 12:00 PM Jose M Dalal [id number] [facility]   7/8/2019 12:00 PM Dearlacie Torres PT MMCPTPB [facility]   7/9/2019  3:00 PM Saskia Mckeon  [address]

## 2019-06-19 ENCOUNTER — APPOINTMENT (OUTPATIENT)
Dept: PHYSICAL THERAPY | Age: 69
End: 2019-06-19
Payer: MEDICARE

## 2019-06-21 ENCOUNTER — APPOINTMENT (OUTPATIENT)
Dept: PHYSICAL THERAPY | Age: 69
End: 2019-06-21
Payer: MEDICARE

## 2019-06-24 ENCOUNTER — APPOINTMENT (OUTPATIENT)
Dept: PHYSICAL THERAPY | Age: 69
End: 2019-06-24
Payer: MEDICARE

## 2019-06-25 ENCOUNTER — APPOINTMENT (OUTPATIENT)
Dept: PHYSICAL THERAPY | Age: 69
End: 2019-06-25
Payer: MEDICARE

## 2019-06-26 ENCOUNTER — APPOINTMENT (OUTPATIENT)
Dept: PHYSICAL THERAPY | Age: 69
End: 2019-06-26
Payer: MEDICARE

## 2019-06-27 ENCOUNTER — HOSPITAL ENCOUNTER (OUTPATIENT)
Dept: PHYSICAL THERAPY | Age: 69
Discharge: HOME | End: 2019-06-27
Payer: MEDICARE

## 2019-06-27 PROCEDURE — 97140 MANUAL THERAPY 1/> REGIONS: CPT

## 2019-06-27 PROCEDURE — 97110 THERAPEUTIC EXERCISES: CPT

## 2019-06-27 NOTE — PROGRESS NOTES
PT DAILY TREATMENT NOTE 10-18    Patient Name: Oscar De Anda  Date:2019  : 1950  [x]  Patient  Verified  Payor: Yovany Velasquezpip[employer] / Plan: [place] MED[place] & B / Product Type: Medicare /    In time: 12:30  Out time: 1:40  Total Treatment Time (min):70    Visit #: 7 of     Medicare/BCBS Only   Total Timed Codes (min):  55 1:1 Treatment Time: 30       Treatment Area: Pain in right thigh [M79.651]  Radiculopathy, lumbar region [M54.16]  Other symptoms and signs involving the musculoskeletal system [R29.898]  Other abnormalities of gait and mobility [R26.89]    SUBJECTIVE  Pain Level (0-10 scale): 4/10  Any medication changes, allergies to medications, adverse drug reactions, diagnosis change, or new procedure performed?: [x] No    [] Yes (see summary sheet for update)  Subjective functional status/changes:   [] No changes reported  Pt reports same amount of pain. She still has the numbness down the front of the right thigh and pain in the right buttocks.      OBJECTIVE      15 minutes of heat to the l/s and glutes for decreased pain in sitting post exercises            30 min Therapeutic Exercise:  [x] See flow sheet :   Rationale: increase ROM, increase strength, improve coordination, improve balance and increase proprioception to improve the patients ability to amb and perform ADLs with ease    10 minutes of manual therapy for LAD for a right upslip; MET for a right AI; shotgun technique and TPR to the right piriformis in left side lying for decreased pain with ambulation             With   [] TE   [] TA   [] neuro   [] other: Patient Education: [x] Review HEP    [] Progressed/Changed HEP based on:   [] positioning   [] body mechanics   [] transfers   [] heat/ice application    [] other:      Other Objective/Functional Measures:   Decreased right glute strength performing MET and decreased noted during exercises with atrophy present  TTP and multiple trigger points in the right piriformis  No change in numbness in the right thigh during session    Pain Level (0-10 scale) post treatment: 4/10    ASSESSMENT/Changes in Function: Pt is making limited progress towards goals in therapy. She continues to have a pelvic obliquity. She has decreased right compared to left glute strength with resultant hypertonic right piriformis and sacral torsion. Will work to improve the glute strength for decreased compensation by the piriformis to allow for reduction of pain with ambulation and ADLs. .         Progress towards goals / Updated goals:   Short term goals: To be accomplished within 1 week  1. Pt will be independent with HEP to maintain progression. Met 6-7-19     Long term goals: To be accomplished within 8 weeks  1. Pt will improve FOTO score by 8 points to show improvement with functional mobility performance.    2. Pt will report no more than 1-2/10 pain at worst to improve QOL. Cont to fluctuate in mod range 6-13-19  3. Pt will improve B LEs strength at least 4-/5 to improve ease and safety with amb. 4. Pt will reports no falling within 30 days. Current: MET, No falls for about 5 weeks.  (6/17/19)     PLAN  [x]  Upgrade activities as tolerated     [x]  Continue plan of care  []  Update interventions per flow sheet       []  Discharge due to:_  []  Other:_      Carlie Hayes PTA, GABINO 6/27/2019  8:45 AM    Future Appointments   Date Time Provider Noy Christianson   6/28/2019 12:00 PM Martir Horta [id number]   7/1/2019 12:30 PM Martir Cordero PT [id number]   7/3/2019 12:00 PM Delvin Arora PTA [id number]   7/5/2019 12:00 PM Martir Horta [id number]   7/8/2019 12:00 PM Martir Cordero PT [id number]   7/9/2019  3:00 PM Ree Sherman  [address]

## 2019-07-01 ENCOUNTER — HOSPITAL ENCOUNTER (OUTPATIENT)
Dept: PHYSICAL THERAPY | Age: 69
Discharge: HOME | End: 2019-07-01
Payer: MEDICARE

## 2019-07-01 PROCEDURE — 97110 THERAPEUTIC EXERCISES: CPT

## 2019-07-01 NOTE — PROGRESS NOTES
PT DAILY TREATMENT NOTE 10-18    Patient Name: Tristen Hollis  Date:2019  : 1950  [x]  Patient  Verified  Payor: [place] MEDICARE / Plan: [place] MEDICARE PART A & B / Product Type: Medicare /    In time:1236  Out time:132  Total Treatment Time (min): 56  Visit #: 9 of     Medicare/BCBS Only   Total Timed Codes (min):  56 1:1 Treatment Time:  45       Treatment Area: Pain in right thigh [M79.651]  Radiculopathy, lumbar region [M54.16]  Other symptoms and signs involving the musculoskeletal system [R29.898]  Other abnormalities of gait and mobility [R26.89]    SUBJECTIVE  Pain Level (0-10 scale): 6/10  Any medication changes, allergies to medications, adverse drug reactions, diagnosis change, or new procedure performed?: [x] No    [] Yes (see summary sheet for update)  Subjective functional status/changes:   [] No changes reported  Patient reports her back is hurting because she was vacuuming. OBJECTIVE      51 min Therapeutic Exercise:  [x] See flow sheet :   Rationale: increase ROM and increase strength to improve the patients ability to increase ease of ADL's.    5  min Manual Therapy:  TPR right piriformis    Rationale: decrease pain, increase ROM, increase tissue extensibility and decrease trigger points to increase ease of ambulation. With   [] TE   [] TA   [] neuro   [] other: Patient Education: [x] Review HEP    [] Progressed/Changed HEP based on:   [] positioning   [] body mechanics   [] transfers   [] heat/ice application    [] other:      Other Objective/Functional Measures:   Decreased right hip glute compared to left   FOTO 42/100         Pain Level (0-10 scale) post treatment: 5/10    ASSESSMENT/Changes in Function: See progress note.      Patient will continue to benefit from skilled PT services to modify and progress therapeutic interventions, address functional mobility deficits, address ROM deficits, address strength deficits, analyze and address soft tissue restrictions and address imbalance/dizziness to attain remaining goals. []  See Plan of Care  [x]  See progress note/recertification  []  See Discharge Summary         Progress towards goals / Updated goals:  1. Pt will be independent with HEP to maintain progression. Met 6-7-19     Long term goals: To be accomplished within 8 weeks  1. Pt will improve FOTO score by 8 points to show improvement with functional mobility performance.   Progressing, 2 pt improvement 7/1   2. Pt will report no more than 1-2/10 pain at worst to improve QOL.   Not met 5-6/10 LBP and right hip 7/1  3. Pt will improve B LEs strength at least 4-/5 to improve ease and safety with amb. Progressing,Left hip IR 4-/5, ER 4/5  Right hip IR 4-/5, ER 4-/5  Left hip abduction 4/5  Right hip abduction 4-/5  4. Pt will reports no falling within 30 days. Current: MET, No falls for about 5 weeks.  (6/17/19)           PLAN  []  Upgrade activities as tolerated     [x]  Continue plan of care  []  Update interventions per flow sheet       []  Discharge due to:_  []  Other:_      Jeff Pratt, PT 7/1/2019  10:14 AM    Future Appointments   Date Time Provider Noy Christianson   7/1/2019 12:30 PM Derik Call, PT [id number]   7/3/2019 12:00 PM Vania Rich, PTA [id number]   7/5/2019 12:00 PM Quiana SIERRA [facility]   7/8/2019 12:00 PM Derik Call, PT [id number] [facility]   7/9/2019  3:00 PM Carloz Bowman  [address]

## 2019-07-01 NOTE — PROGRESS NOTES
[facility]  [address] [facility]  [phone] fax    Medicare Progress Report      Patient name: Chase Bill Start of Care: 2019   Referral source: Mary Ferrara MD : 1950               Medical Diagnosis: Pain in right thigh [M79.651]  Radiculopathy, lumbar region [M54.16]  Other symptoms and signs involving the musculoskeletal system [R29.898]  Other abnormalities of gait and mobility [R26.89]  Payor: [place] MEDICARE / Plan: VA MEDICARE PART A & B / Product Type: Medicare /  Onset Date: wrosening about 1 month               Treatment Diagnosis: LBP, radiating pain with Right LE and impaired balance   Prior Hospitalization: see medical history Provider#: [id number]   Medications: Verified on Patient summary List    Comorbidities: osteoporosis, back surgery (l/s fusion) in    Prior Level of Function: mod Ind with all mobility, several steps to enter house, no falling. Visits from Start of Care: 9    Missed Visits: 0    Reporting Period: 2019 to 2019    Subjective Reports: Patient reports slight improvement in symptoms with therapy. She is using tennis ball at home for self piriformis release. She has increased back pain when vacuuming. Current Status/ treatment goals Objective measures   1. Pt will improve FOTO score by 8 points to show improvement with functional mobility performance.        [] met                 [] not met  [x] progressing FOTO score improved by 2 pts   2. Pt will report no more than 1-2/10 pain at worst to improve QOL.        [] met                 [x] not met  [] progressing Continues with 4-6/10 pain in low back and right hip   3.  Pt will improve B LEs strength at least 4-/5 to improve ease and safety with amb       [x] met                 [] not met  [] progressing Left hip IR MMT 4-/5,  Left hip ER MMT 4/5  Right hip IR MMT 4-/5 Right hip ER MMT 4-/5  Left hip abduction MMT 4/5  Right hip abduction MMT 4-/5       4. Pt will reports no falling within 30 days       [x] met                 [] not met  [] progressing Patient no recent falls since starting PT      Key functional changes: improving hip strength      Problems/ barriers to goal attainment: none     Assessment / Recommendations: Patient is making slow, steady progress towards goals. She demonstrates improved B hip strength, but continues with decreased glute strength right> left. Her pain level fluctuates from 5-6/10 in her right hip and low back. Her FOTO score improved by 2 points indicating functional progress. Patient continues to have difficulty with household chores, like vacuuming. Patient will benefit from skilled PT to address hip flexibility, core/hip strength, and alignment to reduce pain when performing ADL's. Problem List: pain affecting function, decrease ROM, decrease strength, impaired gait/ balance, decrease ADL/ functional abilitiies, decrease activity tolerance and decrease flexibility/ joint mobility   Treatment Plan: Therapeutic exercise, Therapeutic activities, Neuromuscular re-education, Physical agent/modality, Manual therapy, Patient education and Self Care training    Patient Goal (s) has been updated and includes: \" decrease pain\"    Updated Goals to be accomplished in 7-15 treatments:  1.  Pt will improve FOTO score by 8 points to show improvement with functional mobility performance.   2.Pt will report no more than 1-2/10 pain at worst to improve QOL.   3. Pt will improve B LEs strength at least 4/5 to improve ease and safety with amb.           Frequency / Duration: Patient to be seen 2-3 times per week for 7-15 treatments:      Blaine Diaz, PT 7/1/2019 1:42 PM

## 2019-07-03 ENCOUNTER — APPOINTMENT (OUTPATIENT)
Dept: PHYSICAL THERAPY | Age: 69
End: 2019-07-03
Payer: MEDICARE

## 2019-07-05 ENCOUNTER — HOSPITAL ENCOUNTER (OUTPATIENT)
Dept: PHYSICAL THERAPY | Age: 69
Discharge: HOME | End: 2019-07-05
Payer: MEDICARE

## 2019-07-05 PROCEDURE — 97140 MANUAL THERAPY 1/> REGIONS: CPT

## 2019-07-05 PROCEDURE — 97110 THERAPEUTIC EXERCISES: CPT

## 2019-07-05 NOTE — PROGRESS NOTES
PT DAILY TREATMENT NOTE 10-18    Patient Name: Aggie Breaux  Date:2019  : 1950  [x]  Patient  Verified  Payor: VA MEDICARE / Plan: [place] MEDICARE PART A & B / Product Type: Medicare /    In time: 12:02  Out time: 1:09  Total Treatment Time (min): 67  Visit #: 10 of     Medicare/BCBS Only   Total Timed Codes (min):  57 1:1 Treatment Time:  30       Treatment Area: Pain in right thigh [M79.651]  Radiculopathy, lumbar region [M54.16]  Other symptoms and signs involving the musculoskeletal system [R29.898]  Other abnormalities of gait and mobility [R26.89]    SUBJECTIVE  Pain Level (0-10 scale): 4/10  Any medication changes, allergies to medications, adverse drug reactions, diagnosis change, or new procedure performed?: [x] No    [] Yes (see summary sheet for update)  Subjective functional status/changes:   [] No changes reported  Pt reports more pain with back during the last several day.  She will see MD considering her headache; schedule for bursitis injection on Monday next week (19)    OBJECTIVE    Modality rationale: decrease pain and increase tissue extensibility to improve the patients ability to tolerate ADLs   Min Type Additional Details    [] Estim:  []Unatt       []IFC  []Premod                        []Other:  []w/ice   []w/heat  Position:  Location:    [] Estim: []Att    []TENS instruct  []NMES                    []Other:  []w/US   []w/ice   []w/heat  Position:  Location:    []  Traction: [] Cervical       []Lumbar                       [] Prone          []Supine                       []Intermittent   []Continuous Lbs:  [] before manual  [] after manual    []  Ultrasound: []Continuous   [] Pulsed                           []1MHz   []3MHz W/cm2:  Location:    []  Iontophoresis with dexamethasone         Location: [] Take home patch   [] In clinic   10 []  Ice     [x]  heat  []  Ice massage  []  Laser   []  Anodyne Position: seated  Location: c/s    []  Laser with stim  [] Other:  Position:  Location:    []  Vasopneumatic Device Pressure:       [] lo [] med [] hi   Temperature: [] lo [] med [] hi   [x] Skin assessment post-treatment:  [x]intact []redness- no adverse reaction    []redness  adverse reaction:     32 min Therapeutic Exercise:  [x] See flow sheet :   Rationale: increase ROM and increase strength to improve the patients ability to increase ease of ADL's.     15 min Manual Therapy:  LAD for Right upslip; MET and shotgun to correct Right AI, TPR for Right piriformis   Rationale: decrease pain, increase ROM, increase tissue extensibility and decrease trigger points to increase ease of ambulation.            With   [] TE   [] TA   [] neuro   [] other: Patient Education: [x] Review HEP    [] Progressed/Changed HEP based on:   [] positioning   [] body mechanics   [] transfers   [] heat/ice application    [] other:      Other Objective/Functional Measures:    Cont to be mod challenged with bridges and glute strengthening therex   Mod tenderness along glut med and piriformis of Right LE     Pain Level (0-10 scale) post treatment: 4/10    ASSESSMENT/Changes in Function: pt cont to present with mod pain of back and mal-alignment of pelvic. She also cont to be challenged with strengthening therex. Pt also c/o persistent headache today. Will cont with strengthening deep hips and core as tolerated. Patient will continue to benefit from skilled PT services to modify and progress therapeutic interventions, address functional mobility deficits, address ROM deficits, address strength deficits, analyze and address soft tissue restrictions and address imbalance/dizziness to attain remaining goals.      []  See Plan of Care  [x]  See progress note/recertification  []  See Discharge Summary         Updated Goals to be accomplished in 7-15 treatments:  1. Pt will improve FOTO score by 8 points to show improvement with functional mobility performance.   2.Pt will report no more than 1-2/10 pain at worst to improve QOL.   3. Pt will improve B LEs strength at least 4/5 to improve ease and safety with amb.      PLAN  []  Upgrade activities as tolerated     [x]  Continue plan of care  []  Update interventions per flow sheet       []  Discharge due to:_  []  Other:_      Ritchie Sever, PT 7/5/2019  9:50 AM    Future Appointments   Date Time Provider Noy Christianson   7/5/2019 12:00 PM Marcie RAMOS [facility]   7/8/2019 12:00 PM Nico Leonardo PT [id number]   7/9/2019  3:00 PM Elizabeth Holland  [address]

## 2019-07-08 ENCOUNTER — HOSPITAL ENCOUNTER (OUTPATIENT)
Dept: PHYSICAL THERAPY | Age: 69
Discharge: HOME | End: 2019-07-08
Payer: MEDICARE

## 2019-07-08 PROCEDURE — 97110 THERAPEUTIC EXERCISES: CPT

## 2019-07-08 PROCEDURE — 97140 MANUAL THERAPY 1/> REGIONS: CPT

## 2019-07-08 NOTE — PROGRESS NOTES
PT DAILY TREATMENT NOTE 10-18    Patient Name: Judye Seip  Date:2019  : 1950  [x]  Patient  Verified  Payor: VA MEDICARE / Plan: VA MEDICARE PART A & B / Product Type: Medicare /    In time:1207  Out time:1045  Total Treatment Time (min): 38  Visit #: 11 of     Medicare/BCBS Only   Total Timed Codes (min):  38 1:1 Treatment Time:  38       Treatment Area: Pain in right thigh [M79.651]  Radiculopathy, lumbar region [M54.16]  Other symptoms and signs involving the musculoskeletal system [R29.898]  Other abnormalities of gait and mobility [R26.89]    SUBJECTIVE  Pain Level (0-10 scale): 4/10  Any medication changes, allergies to medications, adverse drug reactions, diagnosis change, or new procedure performed?: [x] No    [] Yes (see summary sheet for update)  Subjective functional status/changes:   [] No changes reported  Pt reports she sees MD Mason Choudhury, going to make him aware of headaches. OBJECTIVE      26 min Therapeutic Exercise:  [x] See flow sheet :   Rationale: increase ROM and increase strength to improve the patients ability to increase ease of ADL's.    12 min Manual Therapy:  LAD for Right upslip; MET and shotgun to correct Right AI, TPR for Right piriformis   Rationale: decrease pain, increase ROM, increase tissue extensibility and decrease trigger points to increase ease of ambulation. With   [] TE   [] TA   [] neuro   [] other: Patient Education: [x] Review HEP    [] Progressed/Changed HEP based on:   [] positioning   [] body mechanics   [] transfers   [] heat/ice application    [] other:      Other Objective/Functional Measures:   Challenged with SB dead bug  Continues with pelvic obliquity      Pain Level (0-10 scale) post treatment: 4/10      ASSESSMENT/Changes in Function: Patient continues to present with pelvic obliquity addressed manually, but little no improvement in pain with correction.  Progressed core strengthening exercises, was challenged with SB dead bug. Educated pt on using tennis ball against the wall for self piriformis release. Patient will continue to benefit from skilled PT services to modify and progress therapeutic interventions, address ROM deficits, address strength deficits and analyze and address soft tissue restrictions to attain remaining goals. [x]  See Plan of Care  []  See progress note/recertification  []  See Discharge Summary         Progress towards goals / Updated goals:  1. Pt will improve FOTO score by 8 points to show improvement with functional mobility performance.   2.Pt will report no more than 1-2/10 pain at worst to improve QOL.    3. Pt will improve B LEs strength at least 4/5 to improve ease and safety with amb.         PLAN  []  Upgrade activities as tolerated     [x]  Continue plan of care  []  Update interventions per flow sheet       []  Discharge due to:_  []  Other:_      Aliyah Zamorano, PT 7/8/2019  12:10 PM    Future Appointments   Date Time Provider Noy Christianson   7/9/2019  3:00 PM Cr Leach  [address]

## 2019-07-09 ENCOUNTER — OFFICE VISIT (OUTPATIENT)
Dept: ORTHOPEDIC SURGERY | Age: 69
End: 2019-07-09

## 2019-07-09 VITALS
SYSTOLIC BLOOD PRESSURE: 119 MMHG | TEMPERATURE: 98.1 F | RESPIRATION RATE: 16 BRPM | OXYGEN SATURATION: 99 % | BODY MASS INDEX: 29.32 KG/M2 | HEIGHT: 65 IN | DIASTOLIC BLOOD PRESSURE: 84 MMHG | WEIGHT: 176 LBS | HEART RATE: 101 BPM

## 2019-07-09 DIAGNOSIS — M25.551 RIGHT HIP PAIN: ICD-10-CM

## 2019-07-09 DIAGNOSIS — Z98.1 S/P LUMBAR FUSION: ICD-10-CM

## 2019-07-09 DIAGNOSIS — M53.3 SACROILIAC PAIN: Primary | ICD-10-CM

## 2019-07-09 DIAGNOSIS — M54.50 LUMBAR SPINE PAIN: ICD-10-CM

## 2019-07-09 DIAGNOSIS — M54.2 NECK PAIN: ICD-10-CM

## 2019-07-09 DIAGNOSIS — M79.18 CERVICAL MYOFASCIAL PAIN SYNDROME: ICD-10-CM

## 2019-07-09 DIAGNOSIS — F07.81 POST CONCUSSIVE SYNDROME: ICD-10-CM

## 2019-07-09 NOTE — PATIENT INSTRUCTIONS
Neck: Exercises  Your Care Instructions  Here are some examples of typical rehabilitation exercises for your condition. Start each exercise slowly. Ease off the exercise if you start to have pain. Your doctor or physical therapist will tell you when you can start these exercises and which ones will work best for you. How to do the exercises  Neck stretch    1. This stretch works best if you keep your shoulder down as you lean away from it. To help you remember to do this, start by relaxing your shoulders and lightly holding on to your thighs or your chair. 2. Tilt your head toward your shoulder and hold for 15 to 30 seconds. Let the weight of your head stretch your muscles. 3. If you would like a little added stretch, use your hand to gently and steadily pull your head toward your shoulder. For example, keeping your right shoulder down, lean your head to the left. 4. Repeat 2 to 4 times toward each shoulder. Diagonal neck stretch    1. Turn your head slightly toward the direction you will be stretching, and tilt your head diagonally toward your chest and hold for 15 to 30 seconds. 2. If you would like a little added stretch, use your hand to gently and steadily pull your head forward on the diagonal.  3. Repeat 2 to 4 times toward each side. Dorsal glide stretch    1. Sit or stand tall and look straight ahead. 2. Slowly tuck your chin as you glide your head backward over your body  3. Hold for a count of 6, and then relax for up to 10 seconds. 4. Repeat 8 to 12 times. Chest and shoulder stretch    1. Sit or stand tall and glide your head backward as in the dorsal glide stretch. 2. Raise both arms so that your hands are next to your ears. 3. Take a deep breath, and as you breathe out, lower your elbows down and behind your back. You will feel your shoulder blades slide down and together, and at the same time you will feel a stretch across your chest and the front of your shoulders.   4. Hold for about 6 seconds, and then relax for up to 10 seconds. 5. Repeat 8 to 12 times. Strengthening: Hands on head    1. Move your head backward, forward, and side to side against gentle pressure from your hands, holding each position for about 6 seconds. 2. Repeat 8 to 12 times. Follow-up care is a key part of your treatment and safety. Be sure to make and go to all appointments, and call your doctor if you are having problems. It's also a good idea to know your test results and keep a list of the medicines you take. Where can you learn more? Go to http://jag-michelle.info/. Enter P975 in the search box to learn more about \"Neck: Exercises. \"  Current as of: September 20, 2018  Content Version: 11.9  © 7125-1425 [employer]. Care instructions adapted under license by [employer] (which disclaims liability or warranty for this information). If you have questions about a medical condition or this instruction, always ask your healthcare professional. [place] [address] any warranty or liability for your use of this information. Shoulder Blade: Exercises  Your Care Instructions  Here are some examples of typical exercises for your condition. Start each exercise slowly. Ease off the exercise if you start to have pain. Your doctor or physical therapist will tell you when you can start these exercises and which ones will work best for you. How to do the exercises  Shoulder roll    1. Stand tall with your chin slightly tucked. Imagine that a string at the top of your head is pulling you straight up. 2. Keep your arms relaxed. All motion will be in your shoulders. 3. Shrug your shoulders up toward your ears, then up and back. [place] your shoulders down and back, like you're sliding your hands down into your back pants pockets. 4. Repeat the circles at least 2 to 4 times. 5. This exercise is also helpful anytime you want to relax.     Lower neck and upper back stretch    1. With your arms about shoulder height, clasp your hands in front of you. 2. Drop your chin toward your chest.  3. Reach straight forward so you are rounding your upper back. Think about pulling your shoulder blades apart. Dionne Dent feel a stretch across your upper back and shoulders. Hold for at least 6 seconds. 4. Repeat 2 to 4 times. Triceps stretch    1. Reach your arm straight up. 2. Keeping your elbow in place, bend your arm and reach your hand down behind your back. 3. With your other hand, apply gentle pressure to the bent elbow. [place]ne [place] feel a stretch at the back of your upper arm and shoulder. Hold about 6 seconds. 4. Repeat 2 to 4 times with each arm. Shoulder stretch    1. Relax your shoulders. 2. Raise one arm to shoulder height, and reach it across your chest.  3. Pull the arm slightly toward you with your other arm. This will help you get a gentle stretch. Hold for about 6 seconds. 4. Repeat 2 to 4 times. Shoulder blade squeeze    1. Sit or stand up tall with your arms at your sides. 2. Keep your shoulders relaxed and down, not shrugged. 3. Squeeze your shoulder blades together. Hold for 6 seconds, then relax. 4. Repeat 8 to 12 times. Straight-arm shoulder blade squeeze    1. Sit or stand tall. Relax your shoulders. 2. With palms down, hold your elastic tubing or band straight out in front of you. 3. Start with slight tension in the tubing or band, with your hands about shoulder-width apart. 4. Slowly pull straight out to the sides, squeezing your shoulder blades together. Keep your arms straight and at shoulder height. Slowly release. 5. Repeat 8 to 12 times. Rowing    1. [place] your elastic tubing or band at about waist height. Take one end in each hand. 2. Sit or stand with your feet hip-width apart. 3. Hold your arms straight in front of you. Adjust your distance to create slight tension in the tubing or band. 4. Slightly tuck your chin.  Relax your shoulders. 5. Without shrugging your shoulders, pull straight back. Your elbows will pass alongside your waist.    Pull-downs    1. [place] your elastic tubing or band in the top of a closed door. Take one end in each hand. 2. Either sit or stand, depending on what is more comfortable. If you feel unsteady, sit on a chair. 3. Start with your arms up and comfortably apart, elbows straight. There should be a slight tension in the tubing or band. 4. Slightly tuck your chin, and look straight ahead. 5. Keeping your back straight, slowly pull down and back, bending your elbows. 6. Stop where your hands are level with your chin, in a \"goalpost\" position. 7. Repeat 8 to 12 times. Chest T stretch    1. Lie on your back. Raise your knees so they are bent. Plant your feet on the floor, hip-width apart. 2. Tuck your chin, and relax your shoulders. 3. Reach your arms straight out to the sides. If you don't feel a mild stretch in your shoulders and across your chest, use a foam roll or a tightly rolled blanket under your spine, from your tailbone to your head. 4. Relax in this position for at least 15 to 30 seconds while you breathe normally. Repeat 2 to 4 times. 5. As you get used to this stretch, keep adding a little more time until you are able relax in this position for 2 or 3 minutes. When you can relax for at least 2 minutes, you only need to do the exercise 1 time per session. Chest goalpost stretch    1. Lie on your back. Raise your knees so they are bent. Plant your feet on the floor, hip-width apart. 2. Tuck your chin, and relax your shoulders. 3. Reach your arms straight out to the sides. 4. Bend your arms at the elbows, with your hands pointed toward the top of your head. Your arms should make an L on either side of your head. Your palms should be facing up.   5. If you don't feel a mild stretch in your shoulders and across your chest, use a foam roll or tightly rolled blanket under your spine, from your tailbone to your head. 6. Relax in this position for at least 15 to 30 seconds while you breathe normally. Repeat 2 to 4 times. 7. Each day you do this exercise, add a little more time until you can relax in this position for 2 or 3 minutes. When you can relax for at least 2 minutes, you only need to do the exercise 1 time per session. Follow-up care is a key part of your treatment and safety. Be sure to make and go to all appointments, and call your doctor if you are having problems. It's also a good idea to know your test results and keep a list of the medicines you take. Where can you learn more? Go to http://jagBigTipmichelle.info/. Enter [phone] in the search box to learn more about \"Shoulder Blade: Exercises. \"  Current as of: September 20, 2018  Content Version: 11.9  © 0727-7727 [employer]. Care instructions adapted under license by [employer] (which disclaims liability or warranty for this information). If you have questions about a medical condition or this instruction, always ask your healthcare professional. [address] any warranty or liability for your use of this information. Neck Spasm: Exercises  Your Care Instructions  Here are some examples of typical rehabilitation exercises for your condition. Start each exercise slowly. Ease off the exercise if you start to have pain. Your doctor or physical therapist will tell you when you can start these exercises and which ones will work best for you. How to do the exercises  Levator scapula stretch    1. Sit in a firm chair, or stand up straight. 2. Gently tilt your head toward your left shoulder. 3. Turn your head to look down into your armpit, bending your head slightly forward. Let the weight of your head stretch your neck muscles. 4. Hold for 15 to 30 seconds. 5. Return to your starting position.   6. Follow the same instructions above, but tilt your head toward your right shoulder. 7. Repeat 2 to 4 times toward each shoulder. Upper trapezius stretch    1. Sit in a firm chair, or stand up straight. 2. This stretch works best if you keep your shoulder down as you lean away from it. To help you remember to do this, start by relaxing your shoulders and lightly holding on to your thighs or your chair. 3. Tilt your head toward your shoulder and hold for 15 to 30 seconds. Let the weight of your head stretch your muscles. 4. If you would like a little added stretch, place your arm behind your back. Use the arm opposite of the direction you are tilting your head. For example, if you are tilting your head to the left, place your right arm behind your back. 5. Repeat 2 to 4 times toward each shoulder. Neck rotation    1. Sit in a firm chair, or stand up straight. 2. Keeping your chin level, turn your head to the right, and hold for 15 to 30 seconds. 3. Turn your head to the left, and hold for 15 to 30 seconds. 4. Repeat 2 to 4 times to each side. Chin tuck    1. Lie on the floor with a rolled-up towel under your neck. Your head should be touching the floor. 2. Slowly bring your chin toward the front of your neck. 3. Hold for a count of 6, and then relax for up to 10 seconds. 4. Repeat 8 to 12 times. Forward neck flexion    1. Sit in a firm chair, or stand up straight. 2. Bend your head forward. 3. Hold for 15 to 30 seconds, then return to your starting position. 4. Repeat 2 to 4 times. Follow-up care is a key part of your treatment and safety. Be sure to make and go to all appointments, and call your doctor if you are having problems. It's also a good idea to know your test results and keep a list of the medicines you take. Where can you learn more? Go to http://jag-michelle.info/. Enter P962 in the search box to learn more about \"Neck Spasm: Exercises. \"  Current as of: September 20, 2018  Content Version: 11.9  © 6763-6294 [employer]. Care instructions adapted under license by [facility] (which disclaims liability or warranty for this information). If you have questions about a medical condition or this instruction, always ask your healthcare professional. [address]  any warranty or liability for your use of this information. Neck Strain or Sprain: Rehab Exercises  Your Care Instructions  Here are some examples of typical rehabilitation exercises for your condition. Start each exercise slowly. Ease off the exercise if you start to have pain. Your doctor or physical therapist will tell you when you can start these exercises and which ones will work best for you. How to do the exercises  Neck rotation    1. Sit in a firm chair, or stand up straight. 2. Keeping your chin level, turn your head to the right, and hold for 15 to 30 seconds. 3. Turn your head to the left and hold for 15 to 30 seconds. 4. Repeat 2 to 4 times to each side. Neck stretches    1. Look straight ahead, and tip your right ear to your right shoulder. Do not let your left shoulder rise up as you tip your head to the right. 2. Hold for 15 to 30 seconds. 3. Tilt your head to the left. Do not let your right shoulder rise up as you tip your head to the left. 4. Hold for 15 to 30 seconds. 5. Repeat 2 to 4 times to each side. Forward neck flexion    1. Sit in a firm chair, or stand up straight. 2. Bend your head forward. 3. Hold for 15 to 30 seconds. 4. Repeat 2 to 4 times. Lateral (side) bend strengthening    1. With your right hand, place your first two fingers on your right temple. 2. Start to bend your head to the side while using gentle pressure from your fingers to keep your head from bending. 3. Hold for about 6 seconds. 4. Repeat 8 to 12 times. 5. Switch hands and repeat the same exercise on your left side. Forward bend strengthening    1.  Place your first two fingers of either hand on your forehead. 2. Start to bend your head forward while using gentle pressure from your fingers to keep your head from bending. 3. Hold for about 6 seconds. 4. Repeat 8 to 12 times. Neutral position strengthening    1. Using one hand, place your fingertips on the back of your head at the top of your neck. 2. Start to bend your head backward while using gentle pressure from your fingers to keep your head from bending. 3. Hold for about 6 seconds. 4. Repeat 8 to 12 times. Chin tuck    1. Lie on the floor with a rolled-up towel under your neck. Your head should be touching the floor. 2. Slowly bring your chin toward your chest.  3. Hold for a count of 6, and then relax for up to 10 seconds. 4. Repeat 8 to 12 times. Follow-up care is a key part of your treatment and safety. Be sure to make and go to all appointments, and call your doctor if you are having problems. It's also a good idea to know your test results and keep a list of the medicines you take. Where can you learn more? Go to http://jag-michelle.info/. Enter M679 in the search box to learn more about \"Neck Strain or Sprain: Rehab Exercises. \"  Current as of: September 20, 2018  Content Version: 11.9  © 2742-1666 [employer]. Care instructions adapted under license by [employer] (which disclaims liability or warranty for this information). If you have questions about a medical condition or this instruction, always ask your healthcare professional. [place] [address] any warranty or liability for your use of this information.

## 2019-07-09 NOTE — PROGRESS NOTES
[address]  [address], [address]  [facility], [address]  Phone: [phone]  Fax: [phone]        Deepthi Renee  : 1950  PCP: Izabel Ren MD  2019    NEW PATIENT      HISTORY OF PRESENT ILLNESS  Tsering Arana is a 77 y.o. female c/o neck, low back, right buttock, and right thigh pain with frequent falls. Pt notes that her RLE feels \"like a wet noodle,\" and she will fall without warning. She has seen Dr. Marychuy Delgadillo for c/o left knee pain and low back pain, and he referred her to PT (19-[facility]; [facility] She had a lumbar fusion L2-S1 in 2015 after she slipped on ice while walking into work in 2015. She is also s/p right SI joint fixation/fusion. She also has a dx of osteoporosis and h/o parathyroidectomy. She reports a recent fall that she fell when she went to answer her door and suffered abrasions to her right arm and she hit her head. She notes that she has noted headaches, photophobia, and difficulty sleeping since this fall. Lumbar MRI 3/26/19: L2-S1: postoperative findings. L1-2: Mild diffuse disc bulging with 2 mm retrolisthesis. She sees Dr. Genesis Huitron for pain management, and he provided a right trochanteric injection yesterday (19). She is an RN (previously a school nurse). She rates her pain as a 4/10 today. ASSESSMENT  Her pain appears due to sacroiliac pain on the right and pelvic pains compensatory for a potential right hip pathology. Her neck pain is likely myofascial in nature. She is neurologically intact. PLAN  1. Advised she f/u with Dr. Marychuy Delgadillo for evaluate a right hip pathology as she had R thigh pain reproduced with internal rotation of the R hip. 2. I advised she consult Dr. Genesis Huitron on option of right SI joint RFA. 3. I advised she f/u with neurology for potential post-concussive symptoms.   4. Referral to PT for neck pain with dry needling [facility])    Pt will f/u in 6 weeks or sooner if needed. Diagnoses and all orders for this visit:    1. Sacroiliac pain  -     REFERRAL TO PHYSICAL THERAPY    2. Right hip pain  -     REFERRAL TO PHYSICAL THERAPY    3. Neck pain  -     AMB POC XRAY, SPINE, CERVICAL; 2 OR 3  -     REFERRAL TO PHYSICAL THERAPY    4. S/P lumbar fusion  -     REFERRAL TO PHYSICAL THERAPY    5. Lumbar spine pain  -     REFERRAL TO PHYSICAL THERAPY    6. Cervical myofascial pain syndrome  -     REFERRAL TO PHYSICAL THERAPY    7. Post concussive syndrome             CHIEF COMPLAINT  Britt Leon is seen today in consultation at the request of Ava Mills MD for complaints of neck, low back, right buttock, right thigh pain. PAST MEDICAL HISTORY   Past Medical History:   Diagnosis Date    Cancer [facility])     SKIN    Neuropathy     Psychiatric disorder     DEPRESSION    Sacroiliitis ([facility] [address]        Past Surgical History:   Procedure Laterality Date    ABDOMEN SURGERY PROC UNLISTED      GASTROPLASTY    BREAST SURGERY PROCEDURE UNLISTED      MASTOPLASTY    HX ABDOMINOPLASTY      HX BACK SURGERY      HX CHOLECYSTECTOMY      HX HEENT      BLEPHAROPLASTY    HX HEENT      FOREHEAD LIFT    HX HYSTERECTOMY      HX KNEE ARTHROSCOPY      HX OTHER SURGICAL      EXC OF MELANOMA    HX TONSILLECTOMY         MEDICATIONS    Current Outpatient Medications   Medication Sig Dispense Refill    DULoxetine (CYMBALTA) 60 mg capsule Take 60 mg by mouth.  tiZANidine (ZANAFLEX) 2 mg tablet Take 4 mg by mouth.  HYDROcodone-acetaminophen (NORCO)  mg tablet Take 1 Tab by mouth.  Lactobacillus Acidoph & Bulgar (FLORANEX) 1 million cell tab tablet Take 2 Tabs by mouth two (2) times a day. 30 Tab 0    calcium-cholecalciferol, d3, (CALCIUM 600 + D) 600-125 mg-unit tab Take 1 Tab by mouth daily.  PYRIDOXINE HCL (VITAMIN B-6 PO) Take 1 Tab by mouth daily.       gabapentin (NEURONTIN) 300 mg capsule TAKE 1 CAPSULE BY MOUTH EVERY MORNING, 2 CAPSULES EVERY AFTERNOON, AND 3 CAPSULES AT NIGHT  4    citalopram (CELEXA) 40 mg tablet Take 40 mg by mouth daily.  melatonin tab tablet Take  by mouth nightly.  famotidine (PEPCID) 20 mg tablet Take 1 Tab by mouth two (2) times a day. (Patient not taking: Reported on 7/9/2019) 30 Tab 0    amLODIPine (NORVASC) 2.5 mg tablet Take 2 Tabs by mouth daily. (Patient not taking: Reported on 7/9/2019) 30 Tab 0    alendronate (FOSAMAX) 70 mg tablet Take 70 mg by mouth every seven (7) days. ALLERGIES  No Known Allergies       SOCIAL HISTORY    Social History     Socioeconomic History    Marital status:      Spouse name: Not on file    Number of children: Not on file    Years of education: Not on file    Highest education level: Not on file   Tobacco Use    Smoking status: Never Smoker    Smokeless tobacco: Never Used   Substance and Sexual Activity    Alcohol use: No    Drug use: No       FAMILY HISTORY  Family History   Problem Relation Age of Onset    Diabetes Mother     Heart Disease Mother          REVIEW OF SYSTEMS  Review of Systems   Musculoskeletal: Positive for back pain, falls and neck pain. Right thigh pain   Neurological: Positive for headaches. PHYSICAL EXAMINATION  Visit Vitals  /84   Pulse (!) 101   Temp 98.1 °F (36.7 °C)   Resp 16   Ht 5' 5\" (1.651 m)   Wt 176 lb (79.8 kg)   SpO2 99%   BMI 29.29 kg/m²         Pain Assessment  7/9/2019   Location of Pain Back   Location Modifiers Inferior   Severity of Pain 4   Quality of Pain Throbbing; Sharp   Duration of Pain Persistent   Frequency of Pain Constant   Aggravating Factors Bending;Standing;Walking;Stretching   Limiting Behavior Yes   Relieving Factors NSAID   Result of Injury Yes   Work-Related Injury No   Type of Injury Fall         Constitutional:  Well developed, well nourished, in no acute distress. Psychiatric: Affect and mood are appropriate. HEENT: Normocephalic, atraumatic. Extraocular movements intact. Integumentary: No rashes or abrasions noted on exposed areas. Cardiovascular: Regular rate and rhythm. Pulmonary: Clear to auscultation bilaterally. SPINE/MUSCULOSKELETAL EXAM    Cervical spine:  Neck is midline. Normal muscle tone. No focal atrophy is noted. ROM pain free. Shoulder ROM intact. Tenderness to palpation of cervical paraspinals. Negative Spurling's sign. Negative Tinel's sign. Negative Valverde's sign. Sensation in the bilateral arms grossly intact to light touch. Lumbar spine:  No rash, ecchymosis, or gross obliquity. No fasciculations. No focal atrophy is noted. No pain with hip ROM. Full range of motion. Tenderness to palpation. No tenderness to palpation at the sciatic notch. SI joints tender bilaterally (R>L). Trochanters non tender. Piriformis tender on the R.     Sensation in the bilateral legs grossly intact to light touch. R thigh pain reproduced with internal rotation of the R hip. Negative Babinski. MOTOR:      Biceps  Triceps Deltoids Wrist Ext Wrist Flex Hand Intrin   Right 5/5 5/5 5/5 5/5 5/5 5/5   Left 5/5 5/5 5/5 5/5 5/5 5/5             Hip Flex  Quads Hamstrings Ankle DF EHL Ankle PF   Right 5/5 5/5 5/5 5/5 5/5 5/5   Left 5/5 5/5 5/5 5/5 4+/5 5/5     DTRs are 2+ biceps, triceps, brachioradialis, patella, and Achilles. Negative Straight Leg raise. Squat not tested. No difficulty with tandem gait. Ambulation without assistive device. FWB. RADIOGRAPHS  Cervical XR images taken on 7/9/19 personally reviewed with patient:  Disc space narrowing C5-6, C6-7  Straightening of the cervical lordosis  No obvious compression fractures or instabilities     Lumbar MRI images taken on 3/26/19 personally reviewed with patient:  General observations: Postoperative findings. Metallic artifact related to posterior pedicle screw fixation and connecting rods L2-S1.  Fluid collection at the laminectomy sites measuring approximately 9 mm from superior to inferior, 1.5 cm from anterior to posterior, and 3 cm from medial to lateral.  Alignment: L2-3: 3 mm anterior subluxation versus posterior angulation of the posterior-superior margin of L3. Vertebral bodies: L3 anterior wedge deformity, chronic. Approximately 50% loss of height anteriorly. L1 subtle anterior wedge deformity, considered within normal limits at this level. No bone marrow edema is evident to indicate recent compression fracture. L5-S1: Normal disc height. Mild disc desiccation. Minimal disc bulging. Wide posterior decompression. L4-5: Moderately severe disc narrowing. Mild disc desiccation. Wide posterior decompression. Minimal disc bulging. L3-4: Normal disc height and hydration. Mild diffuse annular bulging. Wide posterior decompression. L2-3: Normal disc height and hydration. Mild-moderate diffuse disc bulging. Wide posterior decompression. L1-2: Normal disc height and hydration. Mild diffuse disc bulging. 2 mm retrolisthesis. Conus termination: L1-2. Lower thoracic spine: Within normal limits. Upper sacrum: right sacroiliac joint fixation device. Comparison: little interval change. IMPRESSION:  L2-S1: postoperative findings. L1-2: Mild diffuse disc bulging with 2 mm retrolisthesis. Little interval change compared to the most recent previous MRI study performed here March 24., 2016.  reviewed    Ms. Julien Flores has a reminder for a \"due or due soon\" health maintenance. I have asked that she contact her primary care provider for follow-up on this health maintenance. 43 minutes of face-to-face contact were spent with the patient during today's visit extensively discussing symptoms and treatment plan. All questions were answered. More than half of this visit today was spent on counseling. Written by Saira Machado, as dictated by Dr. Tomeka Gayle.      I, Dr. Tomeka Gayle, confirm that all documentation is accurate.

## 2019-07-10 ENCOUNTER — APPOINTMENT (OUTPATIENT)
Dept: PHYSICAL THERAPY | Age: 69
End: 2019-07-10
Payer: MEDICARE

## 2019-07-11 ENCOUNTER — HOSPITAL ENCOUNTER (OUTPATIENT)
Dept: PHYSICAL THERAPY | Age: 69
Discharge: HOME | End: 2019-07-11
Payer: MEDICARE

## 2019-07-11 PROCEDURE — 97110 THERAPEUTIC EXERCISES: CPT

## 2019-07-11 PROCEDURE — 97140 MANUAL THERAPY 1/> REGIONS: CPT

## 2019-07-11 NOTE — PROGRESS NOTES
PT DAILY TREATMENT NOTE 10-18    Patient Name: Franco Saleh  Date:2019  : 1950  [x]  Patient  Verified  [place]or: [place] MED[place]ARE / Plan: [place] MEDICARE PART A & B / Product Type: Medicare /    In time:1030  Out time:1120  Total Treatment Time (min): 50  Visit #: 11 of     Medicare/BCBS Only   Total Timed Codes (min):  40 1:1 Treatment Time:  31       Treatment Area: Pain in right thigh [M79.651]  Radiculopathy, lumbar region [M54.16]  Other symptoms and signs involving the musculoskeletal system [R29.898]  Other abnormalities of gait and mobility [R26.89]    SUBJECTIVE  Pain Level (0-10 scale): 4/10 low back, 3/10 neck  Any medication changes, allergies to medications, adverse drug reactions, diagnosis change, or new procedure performed?: [x] No    [] Yes (see summary sheet for update)  Subjective functional status/changes:   [] No changes reported  Pt stated that she is about the same    OBJECTIVE    Modality rationale: decrease pain and increase tissue extensibility to improve the patients ability to increase ease with ADLs   Min Type Additional Details    [] Estim:  []Unatt       []IFC  []Premod                        []Other:  []w/ice   []w/heat  Position:  Location:    [] Estim: []Att    []TENS instruct  []NMES                    []Other:  []w/US   []w/ice   []w/heat  Position:  Location:    []  Traction: [] Cervical       []Lumbar                       [] Prone          []Supine                       []Intermittent   []Continuous Lbs:  [] before manual  [] after manual    []  Ultrasound: []Continuous   [] Pulsed                           []1MHz   []3MHz W/cm2:  Location:    []  Iontophoresis with dexamethasone         Location: [] Take home patch   [] In clinic   10 []  Ice     [x]  heat  []  Ice massage  []  Laser   []  Anodyne Position:SL  Location:right hip and low back    []  Laser with stim  []  Other:  Position:  Location:    []  Vasopneumatic Device Pressure:       [] lo [] med [] hi   Temperature: [] lo [] med [] hi   [x] Skin assessment post-treatment:  [x]intact []redness- no adverse reaction    []redness  adverse reaction:     14 min Therapeutic Exercise:  [x] See flow sheet :   Rationale: increase ROM and increase strength to improve the patients ability to increase ease with ADLs    17 min Manual Therapy:  Pelvic alignment check, shot gun and MET to correct left AI, left on left sacral rotation and L4-5 right rotation, BY ATC for 9 minutes, NO CHARGE, 8 minutes of TPR to piriformis by LPTA   Rationale: decrease pain, increase ROM and increase tissue extensibility to improve walking tolerance    With   [x] TE   [] TA   [] neuro   [] other: Patient Education: [x] Review HEP    [] Progressed/Changed HEP based on:   [] positioning   [] body mechanics   [] transfers   [] heat/ice application    [] other:      Other Objective/Functional Measures:   Pt had no difficulty with exercises  Needed cueing to perform TRX squats correctly   Cont with tenderness in right piriformis    Pain Level (0-10 scale) post treatment: 4/10    ASSESSMENT/Changes in Function:   Pt is making slow progress toward goals. Pt cont with pain in the low back and neck. Strength is improving in B LE's    Patient will continue to benefit from skilled PT services to modify and progress therapeutic interventions, address functional mobility deficits, address ROM deficits and address strength deficits to attain remaining goals. []  See Plan of Care  [x]  See progress note/recertification  []  See Discharge Summary         Progress towards goals / Updated goals:  . Pt will improve FOTO score by 8 points to show improvement with functional mobility performance.   2.Pt will report no more than 1-2/10 pain at worst to improve QOL.   Not met. Pt cont with 3-4/10 pain.  7/11/19  3. Pt will improve B LEs strength at least 4/5 to improve ease and safety with amb.     PLAN  []  Upgrade activities as tolerated     [x]  Continue plan of care  []  Update interventions per flow sheet       []  Discharge due to:_  []  Other:_      Radha Groves, PTA 7/11/2019  10:23 AM    Future Appointments   Date Time Provider Noy Christianson   7/11/2019 10:30 AM Mary Crain, PTA [facility]   7/17/2019 12:30 PM Earlean Early, PTA [facility]   7/19/2019 12:00 PM Bre Legato [facility]   7/22/2019 12:30 PM Earlean Early, PTA [facility]   7/26/2019 12:00 PM Bre Legato [facility]   7/29/2019 12:30 PM Earlean Early, PTA [facility]   8/2/2019 12:00 PM Bre Legato [facility]   8/26/2019  1:45 PM Pablo Pope  [id number]

## 2019-07-12 ENCOUNTER — APPOINTMENT (OUTPATIENT)
Dept: PHYSICAL THERAPY | Age: 69
End: 2019-07-12
Payer: MEDICARE

## 2019-07-16 ENCOUNTER — TELEPHONE (OUTPATIENT)
Dept: ORTHOPEDIC SURGERY | Age: 69
End: 2019-07-16

## 2019-07-17 ENCOUNTER — HOSPITAL ENCOUNTER (EMERGENCY)
Age: 69
Discharge: HOME | End: 2019-07-17
Attending: EMERGENCY MEDICINE
Payer: MEDICARE

## 2019-07-17 ENCOUNTER — APPOINTMENT (OUTPATIENT)
Dept: CT IMAGING | Age: 69
End: 2019-07-17
Attending: PHYSICIAN ASSISTANT
Payer: MEDICARE

## 2019-07-17 ENCOUNTER — HOSPITAL ENCOUNTER (OUTPATIENT)
Dept: PHYSICAL THERAPY | Age: 69
Discharge: HOME | End: 2019-07-17
Payer: MEDICARE

## 2019-07-17 ENCOUNTER — APPOINTMENT (OUTPATIENT)
Dept: MRI IMAGING | Age: 69
End: 2019-07-17
Attending: EMERGENCY MEDICINE
Payer: MEDICARE

## 2019-07-17 VITALS
HEART RATE: 86 BPM | BODY MASS INDEX: 26.68 KG/M2 | DIASTOLIC BLOOD PRESSURE: 74 MMHG | RESPIRATION RATE: 14 BRPM | HEIGHT: 67 IN | WEIGHT: 170 LBS | SYSTOLIC BLOOD PRESSURE: 140 MMHG | TEMPERATURE: 98.3 F | OXYGEN SATURATION: 98 %

## 2019-07-17 DIAGNOSIS — R51.9 NONINTRACTABLE HEADACHE, UNSPECIFIED CHRONICITY PATTERN, UNSPECIFIED HEADACHE TYPE: Primary | ICD-10-CM

## 2019-07-17 LAB
ANION GAP SERPL CALC-SCNC: 4 MMOL/L (ref 3–18)
ATRIAL RATE: 94 BPM
BASOPHILS # BLD: 0 K/UL (ref 0–0.1)
BASOPHILS NFR BLD: 0 % (ref 0–2)
BUN SERPL-MCNC: 24 MG/DL (ref 7–18)
BUN/CREAT SERPL: 27 (ref 12–20)
CALCIUM SERPL-MCNC: 9.6 MG/DL (ref 8.5–10.1)
CALCULATED P AXIS, ECG09: 78 DEGREES
CALCULATED R AXIS, ECG10: 60 DEGREES
CALCULATED T AXIS, ECG11: 34 DEGREES
CHLORIDE SERPL-SCNC: 107 MMOL/L (ref 100–108)
CK MB CFR SERPL CALC: NORMAL % (ref 0–4)
CK MB SERPL-MCNC: <1 NG/ML (ref 5–25)
CK SERPL-CCNC: 59 U/L (ref 26–192)
CO2 SERPL-SCNC: 30 MMOL/L (ref 21–32)
CREAT SERPL-MCNC: 0.88 MG/DL (ref 0.6–1.3)
DIAGNOSIS, 93000: NORMAL
DIFFERENTIAL METHOD BLD: ABNORMAL
EOSINOPHIL # BLD: 0.1 K/UL (ref 0–0.4)
EOSINOPHIL NFR BLD: 1 % (ref 0–5)
ERYTHROCYTE [DISTWIDTH] IN BLOOD BY AUTOMATED COUNT: 13.6 % (ref 11.6–14.5)
GLUCOSE SERPL-MCNC: 101 MG/DL (ref 74–99)
HCT VFR BLD AUTO: 42.2 % (ref 35–45)
HGB BLD-MCNC: 13.8 G/DL (ref 12–16)
LYMPHOCYTES # BLD: 2.1 K/UL (ref 0.9–3.6)
LYMPHOCYTES NFR BLD: 20 % (ref 21–52)
MCH RBC QN AUTO: 30.5 PG (ref 24–34)
MCHC RBC AUTO-ENTMCNC: 32.7 G/DL (ref 31–37)
MCV RBC AUTO: 93.2 FL (ref 74–97)
MONOCYTES # BLD: 0.7 K/UL (ref 0.05–1.2)
MONOCYTES NFR BLD: 7 % (ref 3–10)
NEUTS SEG # BLD: 7.2 K/UL (ref 1.8–8)
NEUTS SEG NFR BLD: 72 % (ref 40–73)
P-R INTERVAL, ECG05: 140 MS
PLATELET # BLD AUTO: 259 K/UL (ref 135–420)
PMV BLD AUTO: 10.9 FL (ref 9.2–11.8)
POTASSIUM SERPL-SCNC: 4.8 MMOL/L (ref 3.5–5.5)
Q-T INTERVAL, ECG07: 340 MS
QRS DURATION, ECG06: 70 MS
QTC CALCULATION (BEZET), ECG08: 425 MS
RBC # BLD AUTO: 4.53 M/UL (ref 4.2–5.3)
SODIUM SERPL-SCNC: 141 MMOL/L (ref 136–145)
TROPONIN I SERPL-MCNC: <0.02 NG/ML (ref 0–0.04)
VENTRICULAR RATE, ECG03: 94 BPM
WBC # BLD AUTO: 10.1 K/UL (ref 4.6–13.2)

## 2019-07-17 PROCEDURE — 80048 BASIC METABOLIC PNL TOTAL CA: CPT

## 2019-07-17 PROCEDURE — 82550 ASSAY OF CK (CPK): CPT

## 2019-07-17 PROCEDURE — 72125 CT NECK SPINE W/O DYE: CPT

## 2019-07-17 PROCEDURE — 85025 COMPLETE CBC W/AUTO DIFF WBC: CPT

## 2019-07-17 PROCEDURE — 99284 EMERGENCY DEPT VISIT MOD MDM: CPT

## 2019-07-17 PROCEDURE — 70450 CT HEAD/BRAIN W/O DYE: CPT

## 2019-07-17 PROCEDURE — 93005 ELECTROCARDIOGRAM TRACING: CPT

## 2019-07-17 PROCEDURE — 72141 MRI NECK SPINE W/O DYE: CPT

## 2019-07-17 NOTE — ED TRIAGE NOTES
Pt arrived through triage with c/o fall one month s/p leg giving out. Pt states she fell backwards and hit her head. Denies LOC. Still having headaches.

## 2019-07-17 NOTE — ED NOTES
Received nursing report from SANDEEP, [address] [place] [address]. Patient is presently at MRI at this time. Awaiting return in order to assess patient.

## 2019-07-17 NOTE — ED NOTES
I performed a brief evaluation, including history and physical, of the patient here in triage and I have determined that pt will need further treatment and evaluation from the main side ER physician. I have placed initial orders to help in expediting patients care.      July 17, 2019 at 3:23 PM - Aileen Opitz, PA        Visit Vitals  /88 (BP 1 Location: Left arm, BP Patient Position: At rest)   Pulse 99   Temp 98.5 °F (36.9 °C)   Resp 16   Ht 5' 7\" (1.702 m)   Wt 77.1 kg (170 lb)   SpO2 99%   BMI 26.63 kg/m²

## 2019-07-17 NOTE — TELEPHONE ENCOUNTER
Patient came to office, she was given information on the neurologist and also she stated that Dr. Gail Pereyra want her to mention to Dr. Rochelle Contreras that maybe a RFA would help. MD was notified. Patient signed KIANA for dee loomis, dr Joel Rey and another. Also patient mentioned that the physical therapist stated she didn't have a order that include her neck. Called PT [facility] and the PSR stated she will look into whats going on. Unsure as to why the order was closed. Nurse name and nbr was given for a return call. Patient informed.

## 2019-07-18 NOTE — ED NOTES
Received patient back from MRI. Patient resting comfortably on stretcher, denies any new complaints. Awaiting further orders from provider.

## 2019-07-18 NOTE — DISCHARGE INSTRUCTIONS

## 2019-07-18 NOTE — ED PROVIDER NOTES
EMERGENCY DEPARTMENT HISTORY AND PHYSICAL EXAM    8:08 PM  Date: 7/17/2019  Patient Name: Oscar De Anda    History of Presenting Illness     Chief Complaint   Patient presents with    Headache    Head Injury        History Provided By: Patient    HPI: Oscar De Anda is a 77 y.o. female with history of degenerative disc disease. Patient is presenting with headache for about 1 month now as well as neck pain. Denies any arm weakness tingling or numbness. Patient states that her symptoms started after. Her right leg felt weak and she fell down. She reports that the right leg weakness has been going on for over 6 months and she had an MRI for it with her neurosurgeon and she has a plan for surgical procedure but there is no nerve compression noted on her MRI. Denies nausea vomiting or vision changes. Patient did not go to the hospital after the fall but had been having on and off headache for a month. Does not use blood thinners    Location:  Severity:  Timing/course:    Onset/Duration:     PCP: Jann Stauffer MD    Past History     Past Medical History:  Past Medical History:   Diagnosis Date    Cancer ([facility] [address]     SKIN    Neuropathy     Psychiatric disorder     DEPRESSION    Sacroiliitis ([facility] [address]        Past Surgical History:  Past Surgical History:   Procedure Laterality Date    ABDOMEN SURGERY PROC UNLISTED      GASTROPLASTY    BREAST SURGERY PROCEDURE UNLISTED      MASTOPLASTY    HX ABDOMINOPLASTY      HX BACK SURGERY      HX CHOLECYSTECTOMY      HX HEENT      BLEPHAROPLASTY    HX HEENT      FOREHEAD LIFT    HX HYSTERECTOMY      HX KNEE ARTHROSCOPY      HX OTHER SURGICAL      EXC OF MELANOMA    HX TONSILLECTOMY         Family History:  Family History   Problem Relation Age of Onset    Diabetes Mother     Heart Disease Mother        Social History:  Social History     Tobacco Use    Smoking status: Never Smoker    Smokeless tobacco: Never Used   Substance Use Topics    Alcohol use: No    Drug use: No       Allergies:  No Known Allergies    Review of Systems   Review of Systems   Musculoskeletal: Positive for back pain and neck pain. Negative for neck stiffness. Neurological: Positive for headaches. Physical Exam     Patient Vitals for the past 12 hrs:   Temp Pulse Resp BP SpO2   07/17/19 1959 98.3 °F (36.8 °C) 73 18 123/85 99 %   07/17/19 1458 98.5 °F (36.9 °C) 99 16 128/88 99 %       Physical Exam   Constitutional: She is oriented to person, place, and time. She appears well-developed and well-nourished. HENT:   Head: Normocephalic and atraumatic. Eyes: Conjunctivae and EOM are normal.   Neck: Normal range of motion and full passive range of motion without pain. Neck supple. Spinous process tenderness present. Cardiovascular: Normal rate and intact distal pulses. Pulmonary/Chest: Effort normal. No respiratory distress. Musculoskeletal: Normal range of motion. She exhibits no edema or deformity. Neurological: She is alert and oriented to person, place, and time. She exhibits normal muscle tone. Skin: Skin is warm and dry. Psychiatric: She has a normal mood and affect.  Her behavior is normal. Judgment and thought content normal.       Diagnostic Study Results     Labs -  Recent Results (from the past 12 hour(s))   EKG, 12 LEAD, INITIAL    Collection Time: 07/17/19  3:28 PM   Result Value Ref Range    Ventricular Rate 94 BPM    Atrial Rate 94 BPM    P-R Interval 140 ms    QRS Duration 70 ms    Q-T Interval 340 ms    QTC Calculation (Bezet) 425 ms    Calculated P Axis 78 degrees    Calculated R Axis 60 degrees    Calculated T Axis 34 degrees    Diagnosis       Normal sinus rhythm  Possible Left atrial enlargement  Borderline ECG  When compared with ECG of 19-FEB-2019 14:18,  premature atrial complexes are no longer present  Confirmed by Isra Goss ([id number]) on 7/17/2019 5:31:50 PM     CBC WITH AUTOMATED DIFF    Collection Time: 07/17/19  3:45 PM   Result Value Ref Range    WBC 10.1 4.6 - 13.2 K/uL    RBC 4.53 4.20 - 5.30 M/uL    HGB 13.8 12.0 - 16.0 g/dL    HCT 42.2 35.0 - 45.0 %    MCV 93.2 74.0 - 97.0 FL    MCH 30.5 24.0 - 34.0 PG    MCHC 32.7 31.0 - 37.0 g/dL    RDW 13.6 11.6 - 14.5 %    PLATELET 079 074 - 606 K/uL    MPV 10.9 9.2 - 11.8 FL    NEUTROPHILS 72 40 - 73 %    LYMPHOCYTES 20 (L) 21 - 52 %    MONOCYTES 7 3 - 10 %    EOSINOPHILS 1 0 - 5 %    BASOPHILS 0 0 - 2 %    ABS. NEUTROPHILS 7.2 1.8 - 8.0 K/UL    ABS. LYMPHOCYTES 2.1 0.9 - 3.6 K/UL    ABS. MONOCYTES 0.7 0.05 - 1.2 K/UL    ABS. EOSINOPHILS 0.1 0.0 - 0.4 K/UL    ABS. BASOPHILS 0.0 0.0 - 0.1 K/UL    DF AUTOMATED     METABOLIC PANEL, BASIC    Collection Time: 07/17/19  3:45 PM   Result Value Ref Range    Sodium 141 136 - 145 mmol/L    Potassium 4.8 3.5 - 5.5 mmol/L    Chloride 107 100 - 108 mmol/L    CO2 30 21 - 32 mmol/L    Anion gap 4 3.0 - 18 mmol/L    Glucose 101 (H) 74 - 99 mg/dL    BUN 24 (H) 7.0 - 18 MG/DL    Creatinine 0.88 0.6 - 1.3 MG/DL    BUN/Creatinine ratio 27 (H) 12 - 20      GFR est AA >60 >60 ml/min/1.73m2    GFR est non-AA >60 >60 ml/min/1.73m2    Calcium 9.6 8.5 - 10.1 MG/DL   CARDIAC PANEL,(CK, CKMB & TROPONIN)    Collection Time: 07/17/19  3:45 PM   Result Value Ref Range    CK 59 26 - 192 U/L    CK - MB <1.0 <3.6 ng/ml    CK-MB Index  0.0 - 4.0 %     CALCULATION NOT PERFORMED WHEN RESULT IS BELOW LINEAR LIMIT    Troponin-I, QT <0.02 0.0 - 0.045 NG/ML       Radiologic Studies -   Ct Head Wo Cont    Result Date: 7/17/2019  IMPRESSION: 1. No evidence of acute traumatic intracranial process. 2.  Mild to moderate generalized volume loss and minimal sequela of chronic microvascular ischemic disease. Ct Spine Cerv Wo Cont    Result Date: 7/17/2019  IMPRESSION: 1. No acute cervical spine fracture.  2.  Elevation of the anterior longitudinal ligament from C4/C5 through C5/C6 which may be degenerative in nature however on the basis of this imaging appearance it is difficult to exclude an epidural hematoma. Suggest further evaluation with cervical spine MRI. Findings were discussed with Julia Elizabeth on 7/17/19 at 1752 hours. 3.  Moderate degenerative changes as above. 4.  Multinodular thyroid gland. Suggest further evaluation with nonemergent thyroid ultrasound if not performed recently. Medical Decision Making     ED Course: Progress Notes, Reevaluation, and Consults:    8:08 PM Initial assessment performed. The patients presenting problems have been discussed, and they/their family are in agreement with the care plan formulated and outlined with them. I have encouraged them to ask questions as they arise throughout their visit. Provider Notes (Medical Decision Making): 70-year-old female presenting with headache and neck pain after fall. Even though the symptoms been going on for 1 is unlikely to be an acute process but could be a chronic subdural versus spinal hematoma versus ligamentous injury. Head CT and C-spine CT were ordered did not show any acute abnormalities however there was spinal hematoma so an MRI was ordered. She does not have any neuro deficits on exam so she is low risk of her MRI is negative I feel comfortable discharging her with follow-up with her neurologist.  Patient refused to take any pain medicine at this point    Procedures:     Critical Care Time:     Vital Signs-Reviewed the patient's vital signs. Reviewed pt's pulse ox reading. EKG: Interpreted by the EP. Time Interpreted:    Rate:    Rhythm:    Interpretation:   Comparison:     Records Reviewed: Nursing Notes (Time of Review: 8:08 PM)  -I am the first provider for this patient.  -I reviewed the vital signs, available nursing notes, past medical history, past surgical history, family history and social history. Current Outpatient Medications   Medication Sig Dispense Refill    DULoxetine (CYMBALTA) 60 mg capsule Take 60 mg by mouth.       tiZANidine (ZANAFLEX) 2 mg tablet Take 4 mg by mouth.      HYDROcodone-acetaminophen (NORCO)  mg tablet Take 1 Tab by mouth.  famotidine (PEPCID) 20 mg tablet Take 1 Tab by mouth two (2) times a day. (Patient not taking: Reported on 7/9/2019) 30 Tab 0    Lactobacillus Acidoph & Bulgar (FLORANEX) 1 million cell tab tablet Take 2 Tabs by mouth two (2) times a day. 30 Tab 0    amLODIPine (NORVASC) 2.5 mg tablet Take 2 Tabs by mouth daily. (Patient not taking: Reported on 7/9/2019) 30 Tab 0    calcium-cholecalciferol, d3, (CALCIUM 600 + D) 600-125 mg-unit tab Take 1 Tab by mouth daily.  PYRIDOXINE HCL (VITAMIN B-6 PO) Take 1 Tab by mouth daily.  gabapentin (NEURONTIN) 300 mg capsule TAKE 1 CAPSULE BY MOUTH EVERY MORNING, 2 CAPSULES EVERY AFTERNOON, AND 3 CAPSULES AT NIGHT  4    citalopram (CELEXA) 40 mg tablet Take 40 mg by mouth daily.  melatonin tab tablet Take  by mouth nightly.  alendronate (FOSAMAX) 70 mg tablet Take 70 mg by mouth every seven (7) days. Clinical Impression     Clinical Impression: No diagnosis found. Disposition: Discharge      DISCHARGE NOTE:     Pt has been reexamined. Patient has no new complaints, changes, or physical findings. Care plan outlined and precautions discussed. Results of imaging and labs were reviewed with the patient. All medications were reviewed with the patient; will d/c home with return precautions. All of pt's questions and concerns were addressed. Patient was instructed and agrees to follow up with primary care doctor and neurosurgeon, as well as to return to the ED upon further deterioration. Patient is ready to go home. This note was dictated utilizing voice recognition software which may lead to typographical errors. I apologize in advance if the situation occurs. If questions arise please do not hesitate to contact me or call our department.     Pardeep Easley MD  8:08 PM

## 2019-07-19 ENCOUNTER — APPOINTMENT (OUTPATIENT)
Dept: PHYSICAL THERAPY | Age: 69
End: 2019-07-19
Payer: MEDICARE

## 2019-07-26 ENCOUNTER — HOSPITAL ENCOUNTER (OUTPATIENT)
Dept: PHYSICAL THERAPY | Age: 69
Discharge: HOME | End: 2019-07-26
Payer: MEDICARE

## 2019-07-26 PROCEDURE — 97110 THERAPEUTIC EXERCISES: CPT

## 2019-07-26 PROCEDURE — 97140 MANUAL THERAPY 1/> REGIONS: CPT
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
## 2019-07-29 ENCOUNTER — HOSPITAL ENCOUNTER (OUTPATIENT)
Dept: PHYSICAL THERAPY | Age: 69
Discharge: HOME | End: 2019-07-29
Payer: MEDICARE

## 2019-07-29 PROCEDURE — 97140 MANUAL THERAPY 1/> REGIONS: CPT

## 2019-07-29 PROCEDURE — 97110 THERAPEUTIC EXERCISES: CPT
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
## 2019-08-02 ENCOUNTER — HOSPITAL ENCOUNTER (OUTPATIENT)
Dept: PHYSICAL THERAPY | Age: 69
Discharge: HOME | End: 2019-08-02
Payer: MEDICARE

## 2019-08-02 PROCEDURE — 97162 PT EVAL MOD COMPLEX 30 MIN: CPT

## 2019-08-02 PROCEDURE — 97110 THERAPEUTIC EXERCISES: CPT

## 2019-08-02 NOTE — PROGRESS NOTES
[facility]  [facility]  [address] [place] [address]  [phone] fax    Continued Plan of Care/ Re-certification for Physical Therapy Services    Patient name: Eric Gudino Start of Care: 2019   Referral source: Taisha Roldan MD : 1950               Medical Diagnosis: Pain in right thigh [M79.651]  Radiculopathy, lumbar region [M54.16]  Other symptoms and signs involving the musculoskeletal system [R29.898]  Other abnormalities of gait and mobility [R26.89]  Payor: [place] MEDICARE / Plan: [place] MEDICARE PART A & B / Product Type: Medicare /  Onset Date: wrosening about 1 month               Treatment Diagnosis: LBP, radiating pain with Right LE and impaired balance   Prior Hospitalization: see medical history Provider#: [id number]   Medications: Verified on Patient summary List    Comorbidities: osteoporosis, back surgery (l/s fusion) in    Prior Level of Function: mod Ind with all mobility, several steps to enter house, no falling. Visits from Start of Care: 15    Missed Visits: 0    The Plan of Care and following information is based on the patient's current status:  Goal:  Pt will improve FOTO score by 8 points to show improvement with functional mobility   Status at last note/certification:performance. increased 4 points 19  Current Status: not met    Goal: Pt will report no more than 1-2/10 pain at worst to improve QOL.     Status at last note/certification: Not met. Pt cont with 3-4/10 pain. 19.    Current Status: not met    Goal: Pt will improve B LEs strength at least 4/5 to improve ease and safety with amb. not met,   Status at last note/certification:3-/5 with hips ext 19  Current Status: not met    Key functional changes: reported about 40% improvement overall, significant improvement with numbness/tingling but no change with overall pain     Problems/ barriers to goal attainment: mal-alignment of pelvic, fair flexibility and strength of deep hips, neck pain     Problem List: pain affecting function, decrease ROM, decrease strength, edema affecting function, impaired gait/ balance, decrease ADL/ functional abilitiies, decrease activity tolerance, decrease flexibility/ joint mobility and decrease transfer abilities    Treatment Plan: Therapeutic exercise, Therapeutic activities, Neuromuscular re-education, Physical agent/modality, Gait/balance training, Manual therapy, Patient education, Self Care training, Functional mobility training, Home safety training and Stair training     Patient Goal (s) has been updated and includes: improve pain     Goals for this certification period to be accomplished in 4 weeks:  1. Pt will improve FOTO score by 8 points to show improvement with functional mobility performance. increased 4 points 8-2-19  2. Pt will report no more than 1-2/10 pain at worst to improve QOL.   Not met. Pt cont with 3-4/10 pain. 7/11/19. 3. Pt will improve B LEs strength at least 4/5 to improve ease and safety with amb. not met, 3-/5 with hips ext 8-2-19  4. Pt will be independent with HEP to maximize ease with amb/ADLs    Frequency / Duration: Patient to be seen 2 times per week for 4 weeks:    Assessment / Recommendations:pt making limited progress with PT. She noticed improve numbness/tingling and overall strength. However, pt cont to report mod pain with lower back and Right hip. Noticed trigger points with Right piriformis, poor glute strength, mal-alignment and mod tightness of hips flexors. Pt was also limited with tolerance for ADLs/sleep due to neck pain. She would cont to benefit form skilled PT with focus on deep hips flexibility and strength for more comfortable ADLs/amb. Refer back to MD if cont to demonstrate limited progress after 2-3 more weeks.      Certification Period: 8-5-2019 to 9-4-2019    Katelyn Varela, PT 8/2/2019 1:02 PM    ________________________________________________________________________  I certify that the above Therapy Services are being furnished while the patient is under my care. I agree with the treatment plan and certify that this therapy is necessary. [] I have read the above and request that my patient continue as recommended.   [] I have read the above report and request that my patient continue therapy with the following changes/special instructions: _______________________________________  [] I have read the above report and request that my patient be discharged from therapy    Physician's Signature:____________Date:_________TIME:________    ** Signature, Date and Time must be completed for valid certification **    Please sign and return to [facility]  [facility]  [address] [place] [address]  [phone] fax

## 2019-08-02 NOTE — PROGRESS NOTES
[facility]  [place] [facility]  [address] [place] [address]  [phone] fax    Plan of Care/ Statement of Necessity for Physical Therapy Services    Patient name: Tristen Hollis Start of Care: 2019   Referral source: Raysa Pappas MD : 1950    Medical Diagnosis: Neck pain [M54.2]  Payor: Emilia Hameed / Plan: [place] MED[place] PART A & B / Product Type: Medicare /  Onset Date:7 weeks ago    Treatment Diagnosis: CS Pain   Prior Hospitalization: see medical history Provider#: [id number]   Medications: Verified on Patient summary List    Comorbidities: Depression,  osteoporosis, back surgery (l/s fusion) in 2015, DDD. Prior Level of Function: mod Ind with all mobility, several steps to enter house, no falling. Was previously working as a nurse. The Plan of Care and following information is based on the information from the initial evaluation. Assessment/ key information: Pt is a 77 yr old female who reported he had a fall ( her right leg gave out ) backward and hit her head at home while answering the door approx 7 weeks ago. She did not LOC. Pt reports onset of headaches to the top and temporal regions as well as intermittent CS pain. Per CT scan , there was no evidence of intracranial trauma. Pt reports 4/10 pain that in intermittent and throbbing. She denies any paresthesias to her UE's. Pt has most difficulty with CS rotation especially during driving and with CS extension. TTP to CS paraspinals and UT bilaterally. There is hypertonicity to upper back musculature and shoulders. Pt denies any dizziness. Spurlings Test Negative, Alar ligament test inconclusive due to longitudinal ligament.     Patient will benefit from skilled PT services to modify and progress therapeutic interventions, address functional mobility deficits, address ROM deficits, address strength deficits, analyze and address soft tissue restrictions, analyze and cue movement patterns, analyze and modify body mechanics/ergonomics and assess and modify postural abnormalities to attain functional goals. Evaluation Complexity History MEDIUM  Complexity : 1-2 comorbidities / personal factors will impact the outcome/ POC ; Examination MEDIUM Complexity : 3 Standardized tests and measures addressing body structure, function, activity limitation and / or participation in recreation  ;Presentation MEDIUM Complexity : Evolving with changing characteristics  ; Clinical Decision Making MEDIUM Complexity : FOTO score of 26-74  Overall Complexity Rating: MEDIUM  Problem List: pain affecting function, decrease ROM, decrease strength, decrease ADL/ functional abilitiies, decrease activity tolerance, decrease flexibility/ joint mobility and decrease transfer abilities   Treatment Plan may include any combination of the following: Therapeutic exercise, Therapeutic activities, Neuromuscular re-education, Physical agent/modality, Gait/balance training, Manual therapy, Patient education and Self Care training  Patient / Family readiness to learn indicated by: asking questions, trying to perform skills and interest  Persons(s) to be included in education: patient (P)  Barriers to Learning/Limitations: None  Patient Goal (s): Back to Normal  Patient Self Reported Health Status: fair  Rehabilitation Potential: fair    Short Term Goals: To be accomplished in 1 weeks:   1. Pt will be compliant with a HEP to improve CS function. Long Term Goals: To be accomplished in 8 weeks:   1. Pt will increase FOTO score by 12 pts to improve CS function. 2. Pt will increase CS Rotation >55 deg bilaterally to be able to improve with driving ability. 3. Pt will report CS pain <3/10 to ease with ADL's.   4. Pt will report >70% improvement is sx to ease with ADL's. Frequency / Duration: Patient to be seen 2 times per week for 8 weeks.     Patient/ Caregiver education and instruction: Diagnosis, prognosis, self care, activity modification and exercises   [x]  Plan of care has been reviewed with PTA    Certification Period: 8/2/19-9/30/19  Caleb Landin, PT 8/2/2019 2:47 PM    ________________________________________________________________________    I certify that the above Therapy Services are being furnished while the patient is under my care. I agree with the treatment plan and certify that this therapy is necessary.     Physician's Signature:____________Date:_________TIME:________    ** Signature, Date and Time must be completed for valid certification **    Please sign and return to [facility]  Fariba Fitzpatrick  [facility]  [phone] fax

## 2019-08-02 NOTE — PROGRESS NOTES
PT DAILY TREATMENT NOTE 10-18    Patient Name: Roya Landaverde  Date:2019  : 1950  [x]  Patient  Verified  Payor: Mary Lou Ogden / Plan: [place] MEDICARE PART A & B / Product Type: Medicare /    In time: 12:05  Out time: 1:00  Total Treatment Time (min): 55  Visit #: 15 of     Medicare/BCBS Only   Total Timed Codes (min):  40 1:1 Treatment Time:  40       Treatment Area: Pain in right thigh [M79.651]  Radiculopathy, lumbar region [M54.16]  Other symptoms and signs involving the musculoskeletal system [R29.898]  Other abnormalities of gait and mobility [R26.89]    SUBJECTIVE  Pain Level (0-10 scale): 4/10  Any medication changes, allergies to medications, adverse drug reactions, diagnosis change, or new procedure performed?: [x] No    [] Yes (see summary sheet for update)  Subjective functional status/changes:   [] No changes reported  Pt reports unable to sleep and continue to have mod pain    OBJECTIVE    Modality rationale: decrease pain and increase tissue extensibility to improve the patients ability to tolerate ADLs/amb   Min Type Additional Details    [] Estim:  []Unatt       []IFC  []Premod                        []Other:  []w/ice   []w/heat  Position:  Location:    [] Estim: []Att    []TENS instruct  []NMES                    []Other:  []w/US   []w/ice   []w/heat  Position:  Location:    []  Traction: [] Cervical       []Lumbar                       [] Prone          []Supine                       []Intermittent   []Continuous Lbs:  [] before manual  [] after manual    []  Ultrasound: []Continuous   [] Pulsed                           []1MHz   []3MHz W/cm2:  Location:    []  Iontophoresis with dexamethasone         Location: [] Take home patch   [] In clinic   15 []  Ice     [x]  heat  []  Ice massage  []  Laser   []  Anodyne Position: supine  Location: neck and back    []  Laser with stim  []  Other:  Position:  Location:    []  Vasopneumatic Device Pressure:       [] lo [] med [] hi Temperature: [] lo [] med [] hi   [] Skin assessment post-treatment:  []intact []redness- no adverse reaction    []redness  adverse reaction:        40 min Therapeutic Exercise:  [x] See flow sheet :   Rationale: increase ROM and increase strength to improve the patients ability to increase ease with ADLs    With   [] TE   [] TA   [] neuro   [] other: Patient Education: [x] Review HEP    [] Progressed/Changed HEP based on:   [] positioning   [] body mechanics   [] transfers   [] heat/ice application    [] other:      Other Objective/Functional Measures:    No change with hips strength   FOTO: 44   Able to maintain form for glute strengthening       Pain Level (0-10 scale) post treatment: 3/10    ASSESSMENT/Changes in Function: See progress note/recertification    Patient will continue to benefit from skilled PT services to modify and progress therapeutic interventions, address functional mobility deficits, address ROM deficits, address strength deficits, analyze and address soft tissue restrictions, analyze and cue movement patterns, analyze and modify body mechanics/ergonomics, assess and modify postural abnormalities, address imbalance/dizziness and instruct in home and community integration to attain remaining goals. -  See Plan of Care  x-  See progress note/recertification  -  See Discharge Summary            Progress towards goals / Updated goals:  1. Pt will improve FOTO score by 8 points to show improvement with functional mobility performance. increased 4 points 8-2-19  2. Pt will report no more than 1-2/10 pain at worst to improve QOL.   Not met. Pt cont with 3-4/10 pain. 7/11/19.    3. Pt will improve B LEs strength at least 4/5 to improve ease and safety with amb. not met, 3-/5 with hips ext 8-2-19     PLAN  [x]  Upgrade activities as tolerated     [x]  Continue plan of care  []  Update interventions per flow sheet       []  Discharge due to:_  []  Other:_      Maryuri Conde, PT 8/2/2019  10:55 AM    Future Appointments   Date Time Provider Noy Meghan   8/2/2019 11:00 AM Davis Solis, PT [id number]   8/2/2019 12:00 PM Anna NICHOLSON [facility]   8/26/2019  1:45 PM Sarina Bonilla  [address]

## 2019-08-02 NOTE — PROGRESS NOTES
PT DAILY TREATMENT NOTE 10-18    Patient Name: Paul Hoffman  Date:2019DOB: 1950  [x]  Patient  Verified  Payor: [place] MEDICARE / Plan: [place] MEDICARE PART A & B / Product Type: Medicare /    In time:1110  Out time:1200  Total Treatment Time (min): 50  Visit #: 1 of 12    Medicare/BCBS Only   Total Timed Codes (min):  50 1:1 Treatment Time:  25       Treatment Area: Neck pain [M54.2]    SUBJECTIVE  Pain Level (0-10 scale): 4/10  Any medication changes, allergies to medications, adverse drug reactions, diagnosis change, or new procedure performed?: [x] No    [] Yes (see summary sheet for update)  Subjective functional status/changes:   [] No changes reported   falls  X 3 got to answer [place] [employer]. Leg gave out and fell backward and hit head. 7 weeks ago   Mri and CT last week   Usually dont get HA's bu more to temporal area. Since fall. Was working a [employer]. Slipped on black ice . CS pain   trobbing and intermittent  No dizziness. [place] went into[place] a ditch.    OBJECTIVE    Modality rationale: decrease pain to improve the patients ability to ease with ADL's   Min Type Additional Details    [] Estim:  []Unatt       []IFC  []Premod                        []Other:  []w/ice   []w/heat  Position:  Location:    [] Estim: []Att    []TENS instruct  []NMES                    []Other:  []w/US   []w/ice   []w/heat  Position:  Location:    []  Traction: [] Cervical       []Lumbar                       [] Prone          []Supine                       []Intermittent   []Continuous Lbs:  [] before manual  [] after manual    []  Ultrasound: []Continuous   [] Pulsed                           []1MHz   []3MHz W/cm2:  Location:    []  Iontophoresis with dexamethasone         Location: [] Take home patch   [] In clinic   10 []  Ice     [x]  heat  []  Ice massage  []  Laser   []  Anodyne Position:seated  Location:CS    []  Laser with stim  []  Other:  Position:  Location:    []  Vasopneumatic Device Pressure:       [] lo [] med [] hi   Temperature: [] lo [] med [] hi   [] Skin assessment post-treatment:  []intact []redness- no adverse reaction    []redness  adverse reaction:     15 min []Eval                  []Re-Eval       25 min Therapeutic Exercise:  [] See flow sheet :   Rationale: increase ROM and increase strength to improve the patients ability to ease with ADLs'        With   [] TE   [] TA   [] neuro   [] other: Patient Education: [x] Review HEP    [] Progressed/Changed HEP based on:   [] positioning   [] body mechanics   [] transfers   [] heat/ice application    [] other:      Other Objective/Functional Measures: CS =40 deg rot left, 50 deg rotation right. Gentle TTP to cs paraspinals with  Pain. Guarded posture. Forward head. UE ROM =WNL. Denies any paresthesias. Hyper tonic Posterior longitudinal ligament and will always have limited CS flexion. Ct Head Wo Cont     Result Date: 7/17/2019  IMPRESSION: 1. No evidence of acute traumatic intracranial process. 2.  Mild to moderate generalized volume loss and minimal sequela of chronic microvascular ischemic disease.      Ct Spine Cerv Wo Cont     Result Date: 7/17/2019  IMPRESSION: 1. No acute cervical spine fracture. 2.  Elevation of the anterior longitudinal ligament from C4/C5 through C5/C6 which may be degenerative in nature however on the basis of this imaging appearance it is difficult to exclude an epidural hematoma. Suggest further evaluation with cervical spine MRI. Findings were discussed with Maribell Colby on 7/17/19 at 1752 hours. 3.  Moderate degenerative changes as above. 4.  Multinodular thyroid gland. Suggest further evaluation with nonemergent thyroid ultrasound if not performed recently.   Pain Level (0-10 scale) post treatment: 3/10    ASSESSMENT/Changes in Function: see poc    Patient will continue to benefit from skilled PT services to modify and progress therapeutic interventions, address functional mobility deficits, address ROM deficits, address strength deficits, analyze and address soft tissue restrictions, analyze and cue movement patterns, analyze and modify body mechanics/ergonomics and assess and modify postural abnormalities to attain remaining goals.      [x]  See Plan of Care  []  See progress note/recertification  []  See Discharge Summary         Progress towards goals / Updated goals:  See poc    PLAN  []  Upgrade activities as tolerated     []  Continue plan of care  []  Update interventions per flow sheet       []  Discharge due to:_  []  Other:_      Kenneth Pal, PT 8/2/2019  11:14 AM    Future Appointments   Date Time Provider Noy Christianson   8/2/2019 12:00 PM Kamar Michele [id number]   8/26/2019  1:45 PM Dari Schroeder  [address]

## 2019-08-06 ENCOUNTER — HOSPITAL ENCOUNTER (OUTPATIENT)
Dept: PHYSICAL THERAPY | Age: 69
Discharge: HOME | End: 2019-08-06
Payer: MEDICARE

## 2019-08-06 PROCEDURE — 97110 THERAPEUTIC EXERCISES: CPT

## 2019-08-06 PROCEDURE — 97140 MANUAL THERAPY 1/> REGIONS: CPT

## 2019-08-06 PROCEDURE — 97530 THERAPEUTIC ACTIVITIES: CPT

## 2019-08-06 NOTE — PROGRESS NOTES
PT DAILY TREATMENT NOTE 10-18    Patient Name: Aggie Breaux  Date:2019  : 1950  [x]  Patient  Verified  Payor: [place] MEDICARE / Plan: [place] MEDICARE PART A & B / Product Type: Medicare /    In time:1:05  Out time:1:58  Total Treatment Time (min): 53  Visit #: 2 of 12    Medicare/BCBS Only   Total Timed Codes (min):  38 1:1 Treatment Time:  36       Treatment Area: Neck pain [M54.2]    SUBJECTIVE  Pain Level (0-10 scale): 4/10 neck and headache   Any medication changes, allergies to medications, adverse drug reactions, diagnosis change, or new procedure performed?: [x] No    [] Yes (see summary sheet for update)  Subjective functional status/changes:   [] No changes reported  Pt reports that she has never had dry needling and she's not sure how it works. The left side of her neck is very tight and sore. She has osteoporosis. She has been doing the exercises at home.          OBJECTIVE    Modality rationale: decrease inflammation and decrease pain to improve the patients ability to reduce post-needling soreness    Min Type Additional Details    [] Estim:  []Unatt       []IFC  []Premod                        []Other:  []w/ice   []w/heat  Position:  Location:    [] Estim: []Att    []TENS instruct  []NMES                    []Other:  []w/US   []w/ice   []w/heat  Position:  Location:    []  Traction: [] Cervical       []Lumbar                       [] Prone          []Supine                       []Intermittent   []Continuous Lbs:  [] before manual  [] after manual    []  Ultrasound: []Continuous   [] Pulsed                           []1MHz   []3MHz W/cm2:  Location:    []  Iontophoresis with dexamethasone         Location: [] Take home patch   [] In clinic   10 [x]  Ice     [x]  heat  []  Ice massage  []  Laser   []  Anodyne Position: seated  Location:heat l/s, ice left shoulder     []  Laser with stim  []  Other:  Position:  Location:    []  Vasopneumatic Device Pressure:       [] lo [] med [] hi Temperature: [] lo [] med [] hi   [x] Skin assessment post-treatment:  [x]intact []redness- no adverse reaction    []redness  adverse reaction:       5 min Therapeutic Exercise:  [x] See flow sheet :   Rationale: increase ROM and increase strength to improve the patients ability to reduce post-needling soreness and improve ease of ADLs    13 min Therapeutic Activity:  []  See flow sheet :   Rationale: Educated patient regarding purpose of dry needling, what to expect, importance of movement and ice/heat use to reduce post-needling soreness, and how to respond to potential adverse effects of dry needling in order to ensure pt understanding     20 min Manual Therapy:  Dry needling (see attached note), TPR left infraspinatus, DTM B cervical paraspinals and UT, gentle SOR, TPR left sternocleidomastoid    Rationale: decrease pain, increase ROM, increase tissue extensibility and decrease trigger points to improve ease of head movements with ADLs    Dry Needling Procedure Note    Procedure: An intramuscular manual therapy (dry needling) and a neuro-muscular re-education treatment was done to deactivate myofascial trigger points with a 30 gauge filament needle under aseptic technique. Indications:  [x] Myofascial pain and dysfunction [] Muscled spasms  [] Myalgia/myositis   [] Muscle cramps  [] Muscle imbalances  [] Temporomandibular Dysfunction  [] Other:    Chart reviewed for the following:  I, Rena Sever, PT, have reviewed the medical history, summary list and precautions/contraindications for [employer].   TIME OUT performed immediately prior to start of procedure:  I, Rena Sever, PT, have performed the following reviews on [employer] prior to the start of the session:      [x] Verified patient identification by name and date of birth    [x] Agreement on all muscles being treated was verified   [x] Purpose of dry needling, side effects, possible complications, risks and benefits were explained to the patient   [x] Procedure site(s) verified  [x] Patient was positioned for comfort and draped for privacy  [x] Informed Consent was signed (initial visit) and verified verbally (subsequent visits)  [x] Patient was instructed on the need to report the use of blood thinners and/or immunosuppressant medications  [x] How to respond to possible adverse effects of treatment  [x] Self treatment of post needling soreness: ice, heat (moist heat, heat wraps) and stretching  [x] Opportunity was given to ask any questions, all questions were answered            Time: 1:12-1:19 (4 minutes of needling)   Date of procedure: 8/6/2019  Treatment: The following muscles were treated today with intramuscular dry needling  [] Left [] Right Masster  [] Left [] Right Temporalis  [] Left [] Right Zygomaticus Major / Minor  [] Left [] Right Lateral Pterygoid  [] Left [] Right Medial Pterygoid  [] Left [] Right Digastric Post / Anterior Belly  [] Left [] Right Sternocleidomastoid  [] Left [] Right Scalene Anterior / Medial / Posterior  [] Left [] Right Extra Laryngeal Muscles  [x] Left [] Right Upper Trapezius  [] Left [] Right Middle Trapezius  [] Left [] Right Lower Trapezius  [] Left [] Right Oblique Capitis Inferior  [] Left [] Right Splenius Capitis / Cervicis  [] Left [] Right Semispinalis: Capitis / Cervicis  [] Left [] Right Multifidi / Rotatores Cervicis / Thoracic  [] Left [] Right Longissimus Thoracis / Illiocostalis  [] Left [] Right Levator Scapulae  [x] Left [] Right Infraspinatus  [] Left [] Right Teres Major / Minor  [] Left [] Right Rhomboids / Serratus posterior superior  [] Left [] Right Pectoralis Major / Minor  [] Left [] Right Serratus Anterior  [] Left [] Right Latissimus Dorsi  [] Left [] Right Subscapularis  [] Left [] Right Coracobrachialis  [] Left [] Right Biceps Brachii  [] Left [] Right Deltoid: Anterior / Medial / Posterior  [] Left [] Right Brachialis  [] Left [] Right Triceps  [] Left [] Right Brachioradialis  [] Left [] Right Extensor Carpi Radialis Brevis / Extensor Carpi Radialis Longus    [] Left [] Right  Extensor digitorum  [] Left [] Right Supinator / Pronator Teres  [] Left [] Right Flexor Carpi Radialis/ Flexor Carpi Ulnaris   [] Left [] Right  Flexor Digitorum Superficialis/ Flexor Digitorum Profundus  [] Left [] Right Flexor Pollicis Longus / Flexor Pollicis Brevis / Palmaris Longus  [] Left [] Right Abductor Pollicis Longus / Abductor Pollicis Brevis  [] Left [] Right Opponens Pollicis / Adductor Pollicis  [] Left [] Right Dorsal / Palmar Interossei / Lumbricalis  [] Left [] Right Abductor Digiti Minimi / Opponens Digiti Minimi    Patient's response to today's treatment:  [x] Latent Twitch Response     [x] Muscle relaxation [x] Pain Relief  [] Post needling soreness    [x] without complications  [] Increased Range of Motion   [] Decreased headaches    [] Decreased Tinnitus  [] Other:     Performed by: Shiv Earl, PT             With   [] TE   [] TA   [] neuro   [] other: Patient Education: [x] Review HEP    [] Progressed/Changed HEP based on:   [] positioning   [] body mechanics   [] transfers   [] heat/ice application    [] other:      Other Objective/Functional Measures:     Neck pain relieved with manual  TPR of left sternocleidomastoid referred pain to left elbow, jaw, and top of head  Headache slightly reduced with suboccipital release  Dry needling of infraspinatus was superficial d/t osteoporosis        Pain Level (0-10 scale) post treatment: 0/10 neck, 2/10 headache     ASSESSMENT/Changes in Function:     Initiated treatment per POC. Pt was motivated in therapy and put forth good effort with interventions. She reported relief with dry needling and manual interventions. Will continue to address strength, ROM, flexibility, and postural deficits in order to reduce pain with daily tasks and improve QOL.         Patient will continue to benefit from skilled PT services to modify and progress therapeutic interventions, address ROM deficits, address strength deficits, analyze and address soft tissue restrictions, analyze and cue movement patterns, assess and modify postural abnormalities and instruct in home and community integration to attain remaining goals. Progress towards goals / Updated goals:  Short Term Goals: To be accomplished in 1 weeks:              1. Pt will be compliant with a HEP to improve CS function. Goal met. 8/7/19  Long Term Goals: To be accomplished in 8 weeks:              1. Pt will increase FOTO score by 12 pts to improve CS function. 2. Pt will increase CS Rotation >55 deg bilaterally to be able to improve with driving ability. 3. Pt will report CS pain <3/10 to ease with ADL's.              4. Pt will report >70% improvement is sx to ease with ADL's.     PLAN  []  Upgrade activities as tolerated     [x]  Continue plan of care  []  Update interventions per flow sheet       []  Discharge due to:_  []  Other:_      Rena Sever, PT 8/6/2019  1:01 PM    Future Appointments   Date Time Provider Noy Christianson   8/8/2019 10:00 AM Derrick Purcell, PTA MMCPTPB [facility]   8/8/2019 10:30 AM Clarke Mazariegos, PT [id number] [facility]   8/13/2019 10:30 AM Dayan Leon, PTA [facility]CPTPB [facility]   8/13/2019 11:00 AM Clarke Mazariegos, PT MMCPTPB [facility]   8/16/2019 11:00 AM Dayan Leon, PTA MMCPTPB [facility]   8/16/2019 11:30 AM Derrick Purcell, PTA [facility]CPTPB [facility]   8/20/2019 10:30 AM Clarke Mazariegos, PT MMCPTPB [facility]   8/20/2019 11:00 AM Yumi Arreola, PT [id number] [facility]   8/22/2019 10:30 AM Yumi Arreola, PT [id number] [facility]   8/22/2019 11:00 AM Laura Sumner, PT [id number] [facility]   8/26/2019  1:45 PM Vonzella Schlatter,  [address] 8/27/2019 12:30 PM Derrick Purcell, PTA MMCPTPB [facility]   8/27/2019  1:00 PM Clarke Mazariegos, PT [id number] [facility]   8/29/2019 11:30 AM Yumi Arreola, PT MMCPTPB [facility]   8/29/2019 12:00 PM Yumi Arreola, PT MMCPTPB [facility]   9/3/2019 10:00 AM Orpha Monday, PT [id number] [facility]   9/3/2019 10:30 AM Devora Bishop, PTA MMCPTPB [facility]   9/5/2019 10:00 AM Jaime Helm, PT MMCPTPB [facility]   9/5/2019 10:30 AM Devora Bishop, PTA MMCPTPB [facility]   9/10/2019 10:00 AM Blanca Moody, PT MMCPTPB [facility]   9/10/2019 10:30 AM Orpha Monday, PT MMCPTPB [facility]   9/12/2019 10:00 AM Jaime Helm, PT MMCPTPB [facility]   9/12/2019 10:30 AM Katey Gomez, PT [id number] [facility]

## 2019-08-06 NOTE — PROGRESS NOTES
PT DAILY TREATMENT NOTE 10-18    Patient Name: Roya Landaverde  Date:2019  : 1950  [x]  Patient  Verified  Payor: [place] MEDICARE / Plan: [place] MEDICARE PART A & B / Product Type: Medicare /    In time: 12:35  Out time:1:05  Total Treatment Time (min): 30  Visit #: 16 of -    Medicare/BCBS Only   Total Timed Codes (min):  30 1:1 Treatment Time:  23       Treatment Area: Pain in right thigh [M79.651]  Radiculopathy, lumbar region [M54.16]  Other symptoms and signs involving the musculoskeletal system [R29.898]  Other abnormalities of gait and mobility [R26.89]    SUBJECTIVE  Pain Level (0-10 scale): 5/10 on left side of back  Any medication changes, allergies to medications, adverse drug reactions, diagnosis change, or new procedure performed?: [x] No    [] Yes (see summary sheet for update)  Subjective functional status/changes:   [] No changes reported  Pt reports having pain on her back, no pain with her thigh today.  She did a lot of vacuuming for her house     OBJECTIVE  Modality rationale: decrease pain and increase tissue extensibility to improve the patients ability to tolerate ADLs/amb   Min Type Additional Details      [] Estim:  []Unatt       []IFC  []Premod                        []Other:  []w/ice   []w/heat  Position:  Location:      [] Estim: []Att    []TENS instruct  []NMES                    []Other:  []w/US   []w/ice   []w/heat  Position:  Location:      []  Traction: [] Cervical       []Lumbar                       [] Prone          []Supine                       []Intermittent   []Continuous Lbs:  [] before manual  [] after manual      []  Ultrasound: []Continuous   [] Pulsed                           []1MHz   []3MHz W/cm2:  Location:      []  Iontophoresis with dexamethasone         Location: [] Take home patch   [] In clinic    15 after PT session for neck pain []  Ice     [x]  heat  []  Ice massage  []  Laser   []  Anodyne Position: supine  Location: neck and back      [] Laser with stim  []  Other:  Position:  Location:      []  Vasopneumatic Device Pressure:       [] lo [] med [] hi   Temperature: [] lo [] med [] hi    [] Skin assessment post-treatment:  []intact []redness- no adverse reaction    []redness  adverse reaction:         22 min Therapeutic Exercise:  [x] See flow sheet :   Rationale: increase ROM and increase strength to improve the patients ability to increase ease with ADLs    8 min Manual rolling pin and TPR for Right piriformis to improve muscles' flexibility and strength for comfortable ADLs/amb      With   [] TE   [] TA   [] neuro   [] other: Patient Education: [x] Review HEP    [] Progressed/Changed HEP based on:   [] positioning   [] body mechanics   [] transfers   [] heat/ice application    [] other:      Other Objective/Functional Measures:     No mal-alignment noticed    Mod challenged with glute strengthening with GTB; will add hip depression for improved activation of glute    Pleased with manual     Pain Level (0-10 scale) post treatment: 5/10    ASSESSMENT/Changes in Function: pt present with no alignment but mod pain. She demonstrates mod challenged with glute strengthening, improved TA activation after cuing and very pleased with TPR for piriformis. Will cont strengthening as tolerated. Patient will continue to benefit from skilled PT services to modify and progress therapeutic interventions, address functional mobility deficits, address ROM deficits, address strength deficits, analyze and address soft tissue restrictions, analyze and cue movement patterns, analyze and modify body mechanics/ergonomics, assess and modify postural abnormalities, address imbalance/dizziness and instruct in home and community integration to attain remaining goals.      []  See Plan of Care  [x]  See progress note/recertification  []  See Discharge Summary         Progress towards goals / Updated goals:  1. Pt will improve FOTO score by 8 points to show improvement with functional mobility performance. increased 4 points 8-2-19  2. Pt will report no more than 1-2/10 pain at worst to improve QOL.   Not met. Pt cont with 3-4/10 pain. 7/11/19.    3. Pt will improve B LEs strength at least 4/5 to improve ease and safety with amb. not met, 3-/5 with hips ext 8-2-19    PLAN  [x]  Upgrade activities as tolerated     [x]  Continue plan of care  []  Update interventions per flow sheet       []  Discharge due to:_  []  Other:_      Sonny Timmons, PT 8/6/2019  9:49 AM    Future Appointments   Date Time Provider Noy Christianson   8/6/2019 12:30 PM Nelson Snyder [id number] [facility]   8/6/2019  1:00 PM Juan Nieto, PT [id number] [facility]   8/8/2019 10:00 AM Waleska Andrea, PTA MMCPTPB [facility]   8/8/2019 10:30 AM Juan Nieto, PT MMCPTPB [facility]   8/13/2019 10:30 AM Cecil Gil, PTA MMCPTPB [facility]   8/13/2019 11:00 AM Juan Nieto, PT MMCPTPB [facility]   8/16/2019 11:00 AM Cecil Gil, PTA MMCPTPB [facility]   8/16/2019 11:30 AM Waleska Andrea, PTA MMCPTPB [facility]   8/20/2019 10:30 AM Juan Nieto, PT MMCPTPB [facility]   8/20/2019 11:00 AM Nai Fix, PT MMCPTPB [facility]   8/22/2019 10:30 AM Benay Fix, PT MMCPTPB [facility]   8/22/2019 11:00 AM Ellen Chapman, PT [id number] [facility]   8/26/2019  1:45 PM Phuong Aguayo  [address]   8/27/2019 12:30 PM Waleska Andrea, PTA MMCPTPB [facility]   8/27/2019  1:00 PM Juan Nieto, PT [id number] [facility]   8/29/2019 11:30 AM Benay Fix, PT MMCPTPB [facility]   8/29/2019 12:00 PM Benay Fix, PT MMCPTPB [facility]   9/3/2019 10:00 AM Ellen Chapman, PT [id number] [facility]   9/3/2019 10:30 AM Waleska Andrea, PTA MMCPTPB [facility]   9/5/2019 10:00 AM Divya Davies, PT MMCPTPB [facility]   9/5/2019 10:30 AM Waleska Andrea, PTA MMCPTPB [facility]   9/10/2019 10:00 AM Benay Fix, PT MMCPTPB [facility]   9/10/2019 10:30 AM Ellen Chapman, PT MMCPTPB [facility]   9/12/2019 10:00 AM Divya Davies, PT MMCPTPB [facility]   9/12/2019 10:30 AM Fred Vyas Render Blocker, PT MMCPTPB [facility]

## 2019-08-07 ENCOUNTER — APPOINTMENT (OUTPATIENT)
Dept: PHYSICAL THERAPY | Age: 69
End: 2019-08-07
Payer: MEDICARE

## 2019-08-08 ENCOUNTER — HOSPITAL ENCOUNTER (OUTPATIENT)
Dept: PHYSICAL THERAPY | Age: 69
Discharge: HOME | End: 2019-08-08
Payer: MEDICARE

## 2019-08-08 ENCOUNTER — HOSPITAL ENCOUNTER (OUTPATIENT)
Dept: PHYSICAL THERAPY | Age: 69
End: 2019-08-08
Payer: MEDICARE

## 2019-08-08 PROCEDURE — 97140 MANUAL THERAPY 1/> REGIONS: CPT

## 2019-08-08 PROCEDURE — 97110 THERAPEUTIC EXERCISES: CPT

## 2019-08-08 NOTE — PROGRESS NOTES
PT DAILY TREATMENT NOTE 10-18    Patient Name: Theodore Reich  Date:2019  : 1950  [x]  Patient  Verified  Payor: VA MEDICARE / Plan: VA MEDICARE PART A & B / Product Type: Medicare /    In time:10:35  Out time:11:02  Total Treatment Time (min): 27  Visit #: 3 of 12    Medicare/BCBS Only   Total Timed Codes (min):  27 1:1 Treatment Time:  27       Treatment Area: Neck pain [M54.2]    SUBJECTIVE  Pain Level (0-10 scale): 310   Any medication changes, allergies to medications, adverse drug reactions, diagnosis change, or new procedure performed?: [x] No    [] Yes (see summary sheet for update)  Subjective functional status/changes:   [] No changes reported  Pt reports that she thought her appts were at 10:30am and 11:00am, which is why she was running late and missed her first appointment. She was sore after the dry needling for a couple hours and then she felt better than she had been feeling once the soreness subsided. She has pain in her left cervical region and right shoulder blade today.       OBJECTIVE    18 min Therapeutic Exercise:  [x] See flow sheet :   Rationale: increase ROM and increase strength to improve the patients ability to improve ease of head movements with ADLs    9 min Manual Therapy:  TPR right latissimus dorsi, DTM B UT and cervical paraspinals, gentle SOR   Rationale: decrease pain, increase ROM, increase tissue extensibility and decrease trigger points to improve ease of reaching and head movements with ADLs        With   [] TE   [] TA   [] neuro   [] other: Patient Education: [x] Review HEP    [] Progressed/Changed HEP based on:   [] positioning   [] body mechanics   [] transfers   [] heat/ice application    [] other:      Other Objective/Functional Measures:     Pt 5 minutes late to therapy   Cues to avoid cervical flexion with cervical retraction, improved form with cuing  Pain relief reported with scapular retraction  Pt reported relief of headache and reduced pain following manual    Pt left to run errands and returned for r/s 2nd appointment at 12pm today, she had head following second appointment      Pain Level (0-10 scale) post treatment: 2/10    ASSESSMENT/Changes in Function:     Pt is making slow progress towards initial goals in therapy. She reported post-needling soreness but then pain reduction following resolution of soreness after dry needling last session. Will continue to address strength, ROM, flexibility, and postural deficits in order to reduce pain/improve ease with daily tasks. Patient will continue to benefit from skilled PT services to modify and progress therapeutic interventions, address functional mobility deficits, address ROM deficits, address strength deficits, analyze and address soft tissue restrictions, analyze and cue movement patterns, analyze and modify body mechanics/ergonomics, assess and modify postural abnormalities and instruct in home and community integration to attain remaining goals. Progress towards goals / Updated goals:  Short Term Goals: To be accomplished in 1 weeks:              1. Pt will be compliant with a HEP to improve CS function. Goal met. 8/7/19  Long Term Goals: To be accomplished in 8 weeks:              6. Pt will increase FOTO score by 12 pts to improve CS function.              2. Pt will increase CS Rotation >55 deg bilaterally to be able to improve with driving ability.             3. Pt will report CS pain <3/10 to ease with ADL's. Progressing.   3/10 prior to therapy this visit 8/8/19              4. Pt will report >70% improvement is sx to ease with ADL's.     PLAN  [x]  Upgrade activities as tolerated     [x]  Continue plan of care  []  Update interventions per flow sheet       []  Discharge due to:_  []  Other:_      Fatemeh Moe PT 8/8/2019  10:45 AM    Future Appointments   Date Time Provider Noy Christianson   8/13/2019 10:30 AM Abner Negrete PTA MMCPTPB [facility]   8/13/2019 11:00 AM Ragini Keene, PT MMCPTPB [facility]   8/16/2019 11:00 AM Chaparrita Tavarez MMCPTPB [facility]   8/16/2019 11:30 AM Karen Fletcher, PTA MMCPTPB [facility]   8/20/2019 10:30 AM Ragini Keene, PT MMCPTPB [facility]   8/20/2019 11:00 AM Ezra Marin, PT MMCPTPB [facility]   8/22/2019 10:30 AM Ezra Marin, PT MMCPTPB [facility]   8/22/2019 11:00 AM Twanna Goldmann, PT [facility]   8/26/2019  1:45 PM Cristhian Hayes  [address]   8/27/2019 12:30 PM Karen Fletcher, PTA MMCPTPB  [facility]   8/27/2019  1:00 PM Ragini Keene, PT [id number] [facility]   9/3/2019 10:00 AM Twanna Goldmann, PT [id number] [facility]   9/3/2019 10:30 AM Karen Fletcher, PTA MMCPTPB [facility]   9/5/2019 10:00 AM Nirali Steven, PT MMCPTPB [facility]   9/5/2019 10:30 AM Karen Fletcher, PTA MMCPTPB [facility]   9/10/2019 10:00 AM Ezra Marin, PT MMCPTPB [facility]   9/10/2019 10:30 AM Twanna Goldmann, PT MMCPTPB [facility]   9/12/2019 10:30 AM Ezra Marin, PT [id number] [facility]   9/12/2019 11:00 AM Kassidy Roberts, PT MMCPTPB [facility]

## 2019-08-08 NOTE — PROGRESS NOTES
PT DAILY TREATMENT NOTE 10-18    Patient Name: Oscar D eAnda  Date:2019  : 1950  [x]  Patient  Verified  Payor: [place] MEDICARE / Plan: [place] MEDICARE PART A & B / Product Type: Medicare /    In time: 12:00  Out time:1:06  Total Treatment Time (min): 66  Visit #: 17 of     Medicare/BCBS Only   Total Timed Codes (min):  51 1:1 Treatment Time:  41       Treatment Area: Pain in right thigh [M79.651]  Radiculopathy, lumbar region [M54.16]  Other symptoms and signs involving the musculoskeletal system [R29.898]  Other abnormalities of gait and mobility [R26.89]    SUBJECTIVE  Pain Level (0-10 scale): 2/10  Any medication changes, allergies to medications, adverse drug reactions, diagnosis change, or new procedure performed?: [x] No    [] Yes (see summary sheet for update)  Subjective functional status/changes:   [] No changes reported  Pt reports continued pain and tightness in the right buttocks region. She notes the massage helps some but she continues to have pain.        OBJECTIVE  Modality rationale: decrease pain and increase tissue extensibility to improve the patients ability to tolerate ADLs/amb   Min Type Additional Details      [] Estim:  []Unatt       []IFC  []Premod                        []Other:  []w/ice   []w/heat  Position:  Location:      [] Estim: []Att    []TENS instruct  []NMES                    []Other:  []w/US   []w/ice   []w/heat  Position:  Location:      []  Traction: [] Cervical       []Lumbar                       [] Prone          []Supine                       []Intermittent   []Continuous Lbs:  [] before manual  [] after manual      []  Ultrasound: []Continuous   [] Pulsed                           []1MHz   []3MHz W/cm2:  Location:      []  Iontophoresis with dexamethasone         Location: [] Take home patch   [] In clinic    15  []  Ice     [x]  heat  []  Ice massage  []  Laser   []  Anodyne Position: supine  Location: neck and back      []  Laser with stim  [] Other:  Position:  Location:      []  Vasopneumatic Device Pressure:       [] lo [] med [] hi   Temperature: [] lo [] med [] hi    [x] Skin assessment post-treatment:  [x]intact []redness- no adverse reaction    []redness  adverse reaction:         41 min Therapeutic Exercise:  [x] See flow sheet :   Rationale: increase ROM and increase strength to improve the patients ability to increase ease with ADLs    10 minutes by ATC NC for manual to correct pelvic alignment right/left sacral rotation and right upslip          With   [] TE   [] TA   [] neuro   [] other: Patient Education: [x] Review HEP    [] Progressed/Changed HEP based on:   [] positioning   [] body mechanics   [] transfers   [] heat/ice application    [] other:      Other Objective/Functional Measures:    Multiple TPs in glute musculature on the right  Fatigue with hip strengthening exercises  Educated on tennis ball rolling to glutes and possible trial of dry needling if she has good results in the c/s      Pain Level (0-10 scale) post treatment: 2/10    ASSESSMENT/Changes in Function: Pt making slow progress at reducing pain levels in the low back and right glutes. She continues to have hypertonicity of the piriformis and multiple TPs in the glutes due to weakness and compensation for fused l/s and SI joint. Will work on core/hip stability to reduce pain and refer back to MD if no further progress is made. Progress towards goals / Updated goals:  1. Pt will improve FOTO score by 8 points to show improvement with functional mobility performance. increased 4 points 8-2-19  2. Pt will report no more than 1-2/10 pain at worst to improve QOL.   Not met. Pt cont with 3-4/10 pain. 7/11/19.    3. Pt will improve B LEs strength at least 4/5 to improve ease and safety with amb. not met, 3-/5 with hips ext 8-2-19    PLAN  [x]  Upgrade activities as tolerated     [x]  Continue plan of care  []  Update interventions per flow sheet       []  Discharge due to:_  []  Other:_      Abby Sutherland, PTA, PT 8/8/2019  9:49 AM    Future Appointments   Date Time Provider Noy Christianson   8/8/2019 12:00 PM Kunal Samson [facility]   8/13/2019 10:30 AM Lenore Denison [facility]   8/13/2019 11:00 AM Omi Brown, PT [facility]   8/16/2019 11:00 AM Lenore Denison [facility]   8/16/2019 11:30 AM Clemariza Raymundo, PTA [facility]   8/20/2019 10:30 AM Omi Brown, PT [facility]   8/20/2019 11:00 AM Wilfredo Huang, PT [facility]   8/22/2019 10:30 AM Wilfredo Huang, PT [facility]   8/22/2019 11:00 AM Raymundo Laurent, PT [facility]   8/26/2019  1:45 PM Maria Guadalupe Bass  [address]   8/27/2019 12:30 PM Cledilshads Zackary, PTA [facility]   8/27/2019  1:00 PM Omi Brown, PT [id number] [facility]   9/3/2019 10:00 AM Raymundo Laurent, PT ECEFSME [facility]   9/3/2019 10:30 AM Cleotis Drum, PTA [facility]   9/5/2019 10:00 AM Michelle Kirkpatrick, PT [facility]   9/5/2019 10:30 AM Cleotis Drum, PTA [facility]   9/10/2019 10:00 AM Wilfredo Huang, PT [facility]   9/10/2019 10:30 AM Raymundo Laurent, PT [facility]   9/12/2019 10:30 AM Wilfredo Huang, PT LUISXCHERRY [facility]   9/12/2019 11:00 AM Wilfredo Huang, PT [facility]

## 2019-08-09 ENCOUNTER — APPOINTMENT (OUTPATIENT)
Dept: PHYSICAL THERAPY | Age: 69
End: 2019-08-09
Payer: MEDICARE

## 2019-08-13 ENCOUNTER — HOSPITAL ENCOUNTER (OUTPATIENT)
Dept: PHYSICAL THERAPY | Age: 69
Discharge: HOME | End: 2019-08-13
Payer: MEDICARE

## 2019-08-13 PROCEDURE — 97140 MANUAL THERAPY 1/> REGIONS: CPT

## 2019-08-13 PROCEDURE — 97110 THERAPEUTIC EXERCISES: CPT

## 2019-08-13 NOTE — PROGRESS NOTES
PT DAILY TREATMENT NOTE 10-18    Patient Name: Mejia Reaves  Date:2019  : 1950  [x]  Patient  Verified  Payor: VA MEDICARE / Plan: VA MEDICARE PART A & B / Product Type: Medicare /    In time:11:05  Out time 12:01  Total Treatment Time (min): 56  Visit #: 19 of     Medicare/BCBS Only   Total Timed Codes (min):  36 1:1 Treatment Time:  30       Treatment Area: Neck pain [M54.2]    SUBJECTIVE  Pain Level (0-10 scale): 3/10  Any medication changes, allergies to medications, adverse drug reactions, diagnosis change, or new procedure performed?: [x] No    [] Yes (see summary sheet for update)  Subjective functional status/changes:   [] No changes reported  Pt reports that the dry needling really helped. She had less pain the next day after the needling and the pain has remained less.       OBJECTIVE    Modality rationale: decrease pain and increase tissue extensibility to improve the patients ability to tolerate daily tasks   Min Type Additional Details    [] Estim:  []Unatt       []IFC  []Premod                        []Other:  []w/ice   []w/heat  Position:  Location:    [] Estim: []Att    []TENS instruct  []NMES                    []Other:  []w/US   []w/ice   []w/heat  Position:  Location:    []  Traction: [] Cervical       []Lumbar                       [] Prone          []Supine                       []Intermittent   []Continuous Lbs:  [] before manual  [] after manual    []  Ultrasound: []Continuous   [] Pulsed                           []1MHz   []3MHz W/cm2:  Location:    []  Iontophoresis with dexamethasone         Location: [] Take home patch   [] In clinic   20 []  Ice     [x]  heat  []  Ice massage  []  Laser   []  Anodyne Position: supine  Location:l/s    []  Laser with stim  []  Other:  Position:  Location:    []  Vasopneumatic Device Pressure:       [] lo [] med [] hi   Temperature: [] lo [] med [] hi   [x] Skin assessment post-treatment:  [x]intact []redness- no adverse reaction    []redness  adverse reaction:     Heat for lumbar chart performed after this session    15 min Therapeutic Exercise:  [x] See flow sheet :   Rationale: increase ROM and increase strength to improve the patients ability to reduce post needling soreness and improve ease of ADLs      15 min Manual Therapy:  Dry needling (see note below), TPR left levator, DTM B UT/levator/cervical paraspinals   Rationale: decrease pain, increase ROM, increase tissue extensibility and decrease trigger points to tolerate daily tasks    Dry Needling Procedure Note    Procedure: An intramuscular manual therapy (dry needling) and a neuro-muscular re-education treatment was done to deactivate myofascial trigger points with a 30 gauge filament needle under aseptic technique. Indications:  [x] Myofascial pain and dysfunction [] Muscled spasms  [] Myalgia/myositis   [] Muscle cramps  [] Muscle imbalances  [] Temporomandibular Dysfunction  [] Other:    Chart reviewed for the following:  Abilio OREILLY, PT, have reviewed the medical history, summary list and precautions/contraindications for [employer].   TIME OUT performed immediately prior to start of procedure:  Abilio OREILLY PT, have performed the following reviews on [employer] prior to the start of the session:      [x] Verified patient identification by name and date of birth    [x] Agreement on all muscles being treated was verified   [x] Purpose of dry needling, side effects, possible complications, risks and benefits were explained to the patient   [x] Procedure site(s) verified  [x] Patient was positioned for comfort and draped for privacy  [x] Informed Consent was signed (initial visit) and verified verbally (subsequent visits)  [x] Patient was instructed on the need to report the use of blood thinners and/or immunosuppressant medications  [x] How to respond to possible adverse effects of treatment  [x] Self treatment of post needling soreness: ice, heat (moist heat, heat wraps) and stretching  [x] Opportunity was given to ask any questions, all questions were answered            Time: 11:10-11:23  Date of procedure: 8/13/2019  Treatment: The following muscles were treated today with intramuscular dry needling  [] Left [] Right Masster  [] Left [] Right Temporalis  [] Left [] Right Zygomaticus Major / Minor  [] Left [] Right Lateral Pterygoid  [] Left [] Right Medial Pterygoid  [] Left [] Right Digastric Post / Anterior Belly  [] Left [] Right Sternocleidomastoid  [] Left [] Right Scalene Anterior / Medial / Posterior  [] Left [] Right Extra Laryngeal Muscles  [x] Left [] Right Upper Trapezius  [] Left [] Right Middle Trapezius  [] Left [] Right Lower Trapezius  [] Left [] Right Oblique Capitis Inferior  [] Left [] Right Splenius Capitis / Cervicis  [] Left [] Right Semispinalis: Capitis / Cervicis  [] Left [] Right Multifidi / Rotatores Cervicis / Thoracic  [] Left [] Right Longissimus Thoracis / Illiocostalis  [] Left [] Right Levator Scapulae  [x] Left [] Right Infraspinatus  [] Left [] Right Teres Major / Minor  [] Left [] Right Rhomboids / Serratus posterior superior  [] Left [] Right Pectoralis Major / Minor  [] Left [] Right Serratus Anterior  [] Left [] Right Latissimus Dorsi  [] Left [] Right Subscapularis  [] Left [] Right Coracobrachialis  [] Left [] Right Biceps Brachii  [] Left [] Right Deltoid: Anterior / Medial / Posterior  [] Left [] Right Brachialis  [] Left [] Right Triceps  [] Left [] Right Brachioradialis  [] Left [] Right Extensor Carpi Radialis Brevis / Extensor Carpi Radialis Longus    [] Left [] Right  Extensor digitorum  [] Left [] Right Supinator / Pronator Teres  [] Left [] Right Flexor Carpi Radialis/ Flexor Carpi Ulnaris   [] Left [] Right  Flexor Digitorum Superficialis/ Flexor Digitorum Profundus  [] Left [] Right Flexor Pollicis Longus / Flexor Pollicis Brevis / Palmaris Longus  [] Left [] Right Abductor Pollicis Longus / Abductor Pollicis Brevis  [] Left [] Right Opponens Pollicis / Adductor Pollicis  [] Left [] Right Dorsal / Palmar Interossei / Lumbricalis  [] Left [] Right Abductor Digiti Minimi / Opponens Digiti Minimi    Patient's response to today's treatment:  [] Latent Twitch Response     [x] Muscle relaxation [x] Pain Relief  [] Post needling soreness    [x] without complications  [] Increased Range of Motion   [] Decreased headaches    [] Decreased Tinnitus  [] Other:     Performed by: Rex Kemp, PT          With   [] TE   [] TA   [] neuro   [] other: Patient Education: [x] Review HEP    [] Progressed/Changed HEP based on:   [] positioning   [] body mechanics   [] transfers   [] heat/ice application    [] other:      Other Objective/Functional Measures:     Pt reported significant reduction in soreness following manual  Required cues for scapular retraction and keeping arms at side with TB rows  Reduced pain following session      Pt requested 15 minutes of heat and then requested 5 more minutes following completion     Pain Level (0-10 scale) post treatment: 3/10    ASSESSMENT/Changes in Function:     Pt is making slow, steady progress towards initial goals in therapy. She reports pain reduction with dry needling during previous session, and pain levels remained decreased. Decreased trigger points/hypertonicity noted in left infraspinatus this session compared to previous session. Will continue to address strength, ROM, flexibility, and postural deficits in order to reduce pain with daily tasks and improve QOL.     Patient will continue to benefit from skilled PT services to modify and progress therapeutic interventions, address functional mobility deficits, address ROM deficits, address strength deficits, analyze and address soft tissue restrictions, analyze and cue movement patterns, analyze and modify body mechanics/ergonomics, assess and modify postural abnormalities and instruct in home and community integration to attain remaining goals. Progress towards goals / Updated goals:  Short Term Goals: To be accomplished in 1 weeks:              1. Pt will be compliant with a HEP to improve CS function. Goal met. 8/7/19  Long Term Goals: To be accomplished in 8 weeks:              1. Pt will increase FOTO score by 12 pts to improve CS function.              2. Pt will increase CS Rotation >55 deg bilaterally to be able to improve with driving ability.             3. Pt will report CS pain <3/10 to ease with ADL's. Progressing.   3/10 prior to therapy this visit 8/8/19              4. Pt will report >70% improvement is sx to ease with ADL's.       PLAN  []  Upgrade activities as tolerated     [x]  Continue plan of care  []  Update interventions per flow sheet       []  Discharge due to:_  []  Other:_      Kena Khan, PT 8/13/2019  11:33 AM    Future Appointments   Date Time Provider Noy Christianson   8/16/2019 11:00 AM Willadean Ormond, PTA [facility]   8/16/2019 11:30 AM Delvin Arora PTA [facility]   8/20/2019 10:30 AM Kirk Robles PT [facility]   8/20/2019 11:00 AM Martir Cordero, PT [facility]   8/22/2019 10:30 AM Martir Cordero PT [facility]   8/22/2019 11:00 AM Branodn Campo, PT [id number] [facility]   8/26/2019  1:45 PM Ree Sherman  [address]   8/27/2019 12:30 PM Delvin Arora PTA [facility]   8/27/2019  1:00 PM Kirk Robles PT [facility]   9/3/2019 10:00 AM Brandon Campo, PT [id number] [facility]   9/3/2019 10:30 AM Delvin Arora PTA [facility]   9/5/2019 10:00 AM Marcella Luis, PT [facility]   9/5/2019 10:30 AM Delvin Arora, PTA [facility]   9/10/2019 10:00 AM Martir Cordero, PT [facility]   9/10/2019 10:30 AM Brandon Campo, PT [facility]   9/12/2019 10:30 AM Martir Cordero, PT [id number] [facility]   9/12/2019 11:00 AM Dre Hester, PT [facility]

## 2019-08-13 NOTE — PROGRESS NOTES
PT DAILY TREATMENT NOTE 10-18    Patient Name: Savita Dye  Date:2019  : 1950  [x]  Patient  Verified  Payor: [place] MEDICARE / Plan: VA MEDICARE PART A & B / Product Type: Medicare /    In time:10:30  Out time:11:05  Total Treatment Time (min): 35  Visit #: 18 of     Medicare/BCBS Only   Total Timed Codes (min):  35 1:1 Treatment Time:  35       Treatment Area: Pain in right thigh [M79.651]  Radiculopathy, lumbar region [M54.16]  Other symptoms and signs involving the musculoskeletal system [R29.898]  Other abnormalities of gait and mobility [R26.89]    SUBJECTIVE  Pain Level (0-10 scale): 3  Any medication changes, allergies to medications, adverse drug reactions, diagnosis change, or new procedure performed?: [x] No    [] Yes (see summary sheet for update)  Subjective functional status/changes:   [] No changes reported  Pt reports feeling like her pain is a little better after PT appts but she still has pain    OBJECTIVE      27 min Therapeutic Exercise:  [x] See flow sheet :   Rationale: increase ROM and increase strength to improve the patients ability to perform ADLs      8 min Manual Therapy:  Right LE LAD to correct right upslip, PA SI mobs, DTM to carmelo piri and L/S paraspinals   Rationale: decrease pain, increase ROM and increase tissue extensibility to improve functional mobility            With   [] TE   [] TA   [] neuro   [] other: Patient Education: [x] Review HEP    [] Progressed/Changed HEP based on:   [] positioning   [] body mechanics   [] transfers   [] heat/ice application    [] other:      Other Objective/Functional Measures:   Right upslip, pain with left SI jt mobs  TTP right piri     Pain Level (0-10 scale) post treatment: 3    ASSESSMENT/Changes in Function: Pt is able to perform all prescribed therex but cont's to report bilateral low back and buttock pain. No change in pain following treatment today.      Patient will continue to benefit from skilled PT services to modify and progress therapeutic interventions, address functional mobility deficits, address ROM deficits, address strength deficits, analyze and address soft tissue restrictions, analyze and cue movement patterns, analyze and modify body mechanics/ergonomics and assess and modify postural abnormalities to attain remaining goals. []  See Plan of Care  []  See progress note/recertification  []  See Discharge Summary         Progress towards goals / Updated goals:  1. Pt will improve FOTO score by 8 points to show improvement with functional mobility performance. increased 4 points 8-2-19  2. Pt will report no more than 1-2/10 pain at worst to improve QOL.   Not met. Pt cont with 3-4/10 pain.  7/11/19.   3. Pt will improve B LEs strength at least 4/5 to improve ease and safety with amb. not met, 3-/5 with hips ext 8-2-19    PLAN  []  Upgrade activities as tolerated     [x]  Continue plan of care  []  Update interventions per flow sheet       []  Discharge due to:_  []  Other:_      Jose Angel Raya PTA 8/13/2019  10:27 AM    Future Appointments   Date Time Provider Noy Christianson   8/13/2019 10:30 AM Hemant Ann PTA [facility]   8/13/2019 11:00 AM Jose Luis Killian PT [facility]   8/16/2019 11:00 AM Hemant Ann PTA [facility]   8/16/2019 11:30 AM David Slona PTA [facility]   8/20/2019 10:30 AM Jose Luis Killian PT [facility]   8/20/2019 11:00 AM Arielle Elena, PT [id number] [facility]   8/22/2019 10:30 AM Arielle Elena, PT [facility]   8/22/2019 11:00 AM Juliana Mcgovern, PT PAOLA [facility]   8/26/2019  1:45 PM Pablo Pope  [address]    8/27/2019 12:30 PM Earlean Early, PTA [facility]   8/27/2019  1:00 PM Jose Luis Killian, PT [facility]   9/3/2019 10:00 AM Juliana Mcgovern, PT GBIMUDD [facility]   9/3/2019 10:30 AM David Early, PTA [facility]   9/5/2019 10:00 AM Tila Ziegler, PT [facility]   9/5/2019 10:30 AM David Sloan, PTA [facility]   9/10/2019 10:00 AM Daphne Brandee Bustillos, PT [facility]   9/10/2019 10:30 AM Saman Bowles, PT [facility]   9/12/2019 10:30 AM Mellisa Amor, PT [id number] [facility]   9/12/2019 11:00 AM Josesito Irvin Marrow, PT [facility]

## 2019-08-16 ENCOUNTER — HOSPITAL ENCOUNTER (OUTPATIENT)
Dept: PHYSICAL THERAPY | Age: 69
Discharge: HOME | End: 2019-08-16
Payer: MEDICARE

## 2019-08-16 PROCEDURE — 97140 MANUAL THERAPY 1/> REGIONS: CPT

## 2019-08-16 PROCEDURE — 97110 THERAPEUTIC EXERCISES: CPT

## 2019-08-16 NOTE — PROGRESS NOTES
PT DAILY TREATMENT NOTE 10-18    Patient Name: Yariel St  Date:2019  : 1950  [x]  Patient  Verified  Payor: Wyatt Rowe / Plan: [place] MED[place]ARE PART A & B / Product Type: Medicare /    In time:11:07  Out time:11:36  Total Treatment Time (min): 29  Visit #: 19 of     Medicare/BCBS Only   Total Timed Codes (min):  29 1:1 Treatment Time:  8       Treatment Area: Neck pain [M54.2]    SUBJECTIVE  Pain Level (0-10 scale): 2  Any medication changes, allergies to medications, adverse drug reactions, diagnosis change, or new procedure performed?: [x] No    [] Yes (see summary sheet for update)  Subjective functional status/changes:   [] No changes reported  Pt reports DN seems to be helping    OBJECTIVE    29 min Therapeutic Exercise:  [x] See flow sheet :   Rationale: increase ROM and increase strength to improve the patients ability to perform ADLs        With   [] TE   [] TA   [] neuro   [] other: Patient Education: [x] Review HEP    [] Progressed/Changed HEP based on:   [] positioning   [] body mechanics   [] transfers   [] heat/ice application    [] other:      Other Objective/Functional Measures:      Pain Level (0-10 scale) post treatment: 2    ASSESSMENT/Changes in Function: Min seated postural cues. Notes Decr'd ms tension following stretches and good results following DN appts. Cont progressing toward improved ROM and strength as tolerated. Patient will continue to benefit from skilled PT services to modify and progress therapeutic interventions, address functional mobility deficits, address ROM deficits, address strength deficits, analyze and address soft tissue restrictions, analyze and cue movement patterns and assess and modify postural abnormalities to attain remaining goals.      []  See Plan of Care  []  See progress note/recertification  []  See Discharge Summary         Progress towards goals / Updated goals:  Short Term Goals: To be accomplished in 1 weeks:              6. Pt will be compliant with a HEP to improve CS function. Goal met. 8/7/19  Long Term Goals: To be accomplished in 8 weeks:              2. Pt will increase FOTO score by 12 pts to improve CS function.              2. Pt will increase CS Rotation >55 deg bilaterally to be able to improve with driving ability.             3. Pt will report CS pain <3/10 to ease with ADL's. Progressing.  3/10 prior to therapy this visit 8/8/19              4. Pt will report >70% improvement is sx to ease with ADL's.     PLAN  []  Upgrade activities as tolerated     [x]  Continue plan of care  []  Update interventions per flow sheet       []  Discharge due to:_  []  Other:_      Brendan Bergman PTA 8/16/2019  11:35 AM    Future Appointments   Date Time Provider Noy Christianson   8/20/2019 10:30 AM Stacy Kimble, PT [email or web address] [facility]   8/20/2019 11:00 AM Marianne Diggs, PT [id number] [facility]   8/22/2019 10:30 AM Marianne Diggs, PT [id number] [facility]   8/22/2019 11:00 AM David Trimble, PT [id number] [facility]   8/26/2019  1:45 PM Hailey Kaufman  [address]   8/27/2019 12:30 PM Elvira Leiva, PTA MMCPTPB [facility]   8/27/2019  1:00 PM Stacy Kimble, PT [id number] [facility]   9/3/2019 10:00 AM Davdi Trimble, PT [id number] [facility]   9/3/2019 10:30 AM Elvira Balloon, PTA MMCPTPB [facility]   9/5/2019 10:00 AM Shree Acuna, PT MMCPTPB [facility]   9/5/2019 10:30 AM Brodnax Balloon, PTA MMCPTPB [facility]   9/10/2019 10:00 AM Marianne Diggs, PT MMCPTPB [facility]   9/10/2019 10:30 AM David Trimble, TAISHA MMCPTPB [facility]   9/12/2019 10:30 AM Marianne Diggs, PT [id number] [facility]   9/12/2019 11:00 AM Gray Lamy Mae Duane, PT MMCPTPB [facility]

## 2019-08-16 NOTE — PROGRESS NOTES
PT DAILY TREATMENT NOTE 10-18    Patient Name: Franco Saleh  Date:2019  : 1950  [x]  Patient  Verified  Payor: [place] MEDICARE / Plan: [place] MEDICARE PART A & B / Product Type: Medicare /    In time: 11:37 Out time:12:20  Total Treatment Time (min): 43  Visit #: 19 of     Medicare/BCBS Only   Total Timed Codes (min):  33 1:1 Treatment Time:  33       Treatment Area: Pain in right thigh [M79.651]  Radiculopathy, lumbar region [M54.16]  Other symptoms and signs involving the musculoskeletal system [R29.898]  Other abnormalities of gait and mobility [R26.89]    SUBJECTIVE  Pain Level (0-10 scale): 3/10  Any medication changes, allergies to medications, adverse drug reactions, diagnosis change, or new procedure performed?: [x] No    [] Yes (see summary sheet for update)  Subjective functional status/changes:   [] No changes reported  Pt reports less numbness in her right thigh. She continues to have pain in her butt on the right. She goes to the MD next week.      OBJECTIVE      33 min Manual Therapy:  MET for right AI; shotgun technique; piriformis muscle pump; TPR to multifidi   Rationale: decrease pain, increase ROM and increase tissue extensibility to improve functional mobility            With   [] TE   [] TA   [] neuro   [] other: Patient Education: [x] Review HEP    [] Progressed/Changed HEP based on:   [] positioning   [] body mechanics   [] transfers   [] heat/ice application    [] other:      Other Objective/Functional Measures:   No upslip noted but continues to have rotation  TTP right piriformis and right side paraspinals  Discussed myofascial pain due to limited ability to stretch l/s due to fusion and may benefit from dry needling/massage for muscle tension relaxation  Continues with right glute atrophy compared to left  Educated on use of theracane      Pain Level (0-10 scale) post treatment: 0/10    ASSESSMENT/Changes in Function: Pt with reducing right thigh symptoms but continues with right glute pain. Due to levels of fusion and SI fusion expect pt to be dealing with myofascial pain but unsure if this is causing nerve compression reducing ability to gait strength in the buttocks region. She does have hypertonicity of the right piriformis but once again expect this to be a symptom and compensation versus a cause of her pain. Will work on core strengthening focus with reducing l/s tightness to see if that reduces her pain in the glute. Progress towards goals / Updated goals:  1. Pt will improve FOTO score by 8 points to show improvement with functional mobility performance. increased 4 points 8-2-19  2. Pt will report no more than 1-2/10 pain at worst to improve QOL.   Not met. Pt cont with 3-4/10 pain.  7/11/19.   3. Pt will improve B LEs strength at least 4/5 to improve ease and safety with amb. not met, 3-/5 with hips ext 8-2-19    PLAN  [x]  Upgrade activities as tolerated     [x]  Continue plan of care  []  Update interventions per flow sheet       []  Discharge due to:_  []  Other:_      Desean Mahoney PTA 8/16/2019  10:27 AM    Future Appointments   Date Time Provider Noy Christianson   8/16/2019 11:00 AM Rama Kirkpatrick PTA [email or web address] [facility]   8/16/2019 11:30 AM Vania Rich PTA MMCPTPB [facility]   8/20/2019 10:30 AM Chauncey Amaya, TAISHA [id number] [facility]   8/20/2019 11:00 AM Derik Call, PT [id number] [facility]   8/22/2019 10:30 AM Derik Call, PT [id number] [facility]   8/22/2019 11:00 AM Colletta Snooks, PT [id number] [facility]   8/26/2019  1:45 PM Carloz Bowman  [address]   8/27/2019 12:30 PM Vania Rich PTA [email or web address] [facility]   8/27/2019  1:00 PM Chauncey Amaya, PT [id number] [facility]   9/3/2019 10:00 AM Colletta Snooks, PT [id number] [facility]   9/3/2019 10:30 AM Vania Corolla, PTA MMCPTPB [facility]   9/5/2019 10:00 AM Bea Quintanilla, PT MMCPTPB [facility]   9/5/2019 10:30 AM Vania Corolla, PTA MMCPTPB [facility]   9/10/2019 10:00 AM Derik Dalal, PT MMCPTPB [facility]   9/10/2019 10:30 AM Colletta Snooks, PT [id number] [facility]   9/12/2019 10:30 AM Janessa Ly, PT [id number] [facility]   9/12/2019 11:00 AM Chiquita Estrada, PT MMCPTPB [facility]

## 2019-08-20 ENCOUNTER — HOSPITAL ENCOUNTER (OUTPATIENT)
Dept: PHYSICAL THERAPY | Age: 69
Discharge: HOME | End: 2019-08-20
Payer: MEDICARE

## 2019-08-20 PROCEDURE — 97110 THERAPEUTIC EXERCISES: CPT

## 2019-08-20 PROCEDURE — 97140 MANUAL THERAPY 1/> REGIONS: CPT

## 2019-08-20 NOTE — PROGRESS NOTES
PT DAILY TREATMENT NOTE 10-18    Patient Name: Jet Varela  Date:2019  : 1950  [x]  Patient  Verified  Payor: [place] MEDICARE / Plan: [place] MEDICARE PART A & B / Product Type: Medicare /    In time:10:30  Out time:11:01  Total Treatment Time (min): 31  Visit #: 21 of 24    Medicare/BCBS Only   Total Timed Codes (min):  31 1:1 Treatment Time:  29       Treatment Area: Neck pain [M54.2]    SUBJECTIVE  Pain Level (0-10 scale): 2/10 c/s, 3/10 l/s   Any medication changes, allergies to medications, adverse drug reactions, diagnosis change, or new procedure performed?: [x] No    [] Yes (see summary sheet for update)  Subjective functional status/changes:   [] No changes reported  Pt reports that the dry needling is helping. It makes her feel better, and then the pain remains decreased. She wants to try dry needling for her back. OBJECTIVE    16 min Therapeutic Exercise:  [x] See flow sheet :   Rationale: increase ROM and increase strength to improve the patients ability to improve ease of ADLs    15 min Manual Therapy:  Dry needling (see below), TPR B levator, DTM B levator/UT/cervical paraspinals   Rationale: decrease pain, increase ROM, increase tissue extensibility and decrease trigger points to improve ease of head movements with daily tasks    Dry Needling Procedure Note    Procedure: An intramuscular manual therapy (dry needling) and a neuro-muscular re-education treatment was done to deactivate myofascial trigger points with a 30 gauge filament needle under aseptic technique. Indications:  [x] Myofascial pain and dysfunction [] Muscled spasms  [] Myalgia/myositis   [] Muscle cramps  [] Muscle imbalances  [] Temporomandibular Dysfunction  [] Other:    Chart reviewed for the following:  Eileen OREILLY, PT, have reviewed the medical history, summary list and precautions/contraindications for Jet Chin.   TIME OUT performed immediately prior to start of procedure:  DENILSON Valentino Kingdom, PT, have performed the following reviews on Razia De La Garza prior to the start of the session:      [x] Verified patient identification by name and date of birth    [x] Agreement on all muscles being treated was verified   [x] Purpose of dry needling, side effects, possible complications, risks and benefits were explained to the patient   [x] Procedure site(s) verified  [x] Patient was positioned for comfort and draped for privacy  [x] Informed Consent was signed (initial visit) and verified verbally (subsequent visits)  [x] Patient was instructed on the need to report the use of blood thinners and/or immunosuppressant medications  [x] How to respond to possible adverse effects of treatment  [x] Self treatment of post needling soreness: ice, heat (moist heat, heat wraps) and stretching  [x] Opportunity was given to ask any questions, all questions were answered            Time: 10:40 - 10:50 (2 minutes needling time)  Date of procedure: 8/20/2019  Treatment: The following muscles were treated today with intramuscular dry needling  [] Left [] Right Masster  [] Left [] Right Temporalis  [] Left [] Right Zygomaticus Major / Minor  [] Left [] Right Lateral Pterygoid  [] Left [] Right Medial Pterygoid  [] Left [] Right Digastric Post / Anterior Belly  [] Left [] Right Sternocleidomastoid  [] Left [] Right Scalene Anterior / Medial / Posterior  [] Left [] Right Extra Laryngeal Muscles  [x] Left [] Right Upper Trapezius  [] Left [] Right Middle Trapezius  [] Left [] Right Lower Trapezius  [] Left [] Right Oblique Capitis Inferior  [] Left [] Right Splenius Capitis / Cervicis  [] Left [] Right Semispinalis: Capitis / Cervicis  [] Left [] Right Multifidi / Rotatores Cervicis / Thoracic  [] Left [] Right Longissimus Thoracis / Illiocostalis  [] Left [] Right Levator Scapulae  [x] Left [] Right Infraspinatus  [] Left [] Right Teres Major / Minor  [] Left [] Right Rhomboids / Serratus posterior superior  [] Left [] Right Pectoralis Major / Minor  [] Left [] Right Serratus Anterior  [] Left [] Right Latissimus Dorsi  [] Left [] Right Subscapularis  [] Left [] Right Coracobrachialis  [] Left [] Right Biceps Brachii  [] Left [] Right Deltoid: Anterior / Medial / Posterior  [] Left [] Right Brachialis  [] Left [] Right Triceps  [] Left [] Right Brachioradialis  [] Left [] Right Extensor Carpi Radialis Brevis / Extensor Carpi Radialis Longus    [] Left [] Right  Extensor digitorum  [] Left [] Right Supinator / Pronator Teres  [] Left [] Right Flexor Carpi Radialis/ Flexor Carpi Ulnaris   [] Left [] Right  Flexor Digitorum Superficialis/ Flexor Digitorum Profundus  [] Left [] Right Flexor Pollicis Longus / Flexor Pollicis Brevis / Palmaris Longus  [] Left [] Right Abductor Pollicis Longus / Abductor Pollicis Brevis  [] Left [] Right Opponens Pollicis / Adductor Pollicis  [] Left [] Right Dorsal / Palmar Interossei / Lumbricalis  [] Left [] Right Abductor Digiti Minimi / Opponens Digiti Minimi    Patient's response to today's treatment:  [] Latent Twitch Response     [x] Muscle relaxation [x] Pain Relief  [x] Post needling soreness    [] without complications  [] Increased Range of Motion   [] Decreased headaches    [] Decreased Tinnitus  [] Other:     Performed by: Abilio Lei, PT          With   [] TE   [] TA   [] neuro   [] other: Patient Education: [x] Review HEP    [] Progressed/Changed HEP based on:   [] positioning   [] body mechanics   [] transfers   [] heat/ice application    [] other:      Other Objective/Functional Measures:     Reducing trigger points in left UT compared to previous sessions  Reduced pain following manual      Pain Level (0-10 scale) post treatment: 1/10 c/s, 3/10 l/s     ASSESSMENT/Changes in Function:     Pt is making slow, steady progress towards initial goals in therapy. She continues to report relief with dry needling and reducing pain levels.  Will continue to address strength, ROM, flexibility, and postural deficits in order to reduce pain with daily tasks and improve QOL. Patient will continue to benefit from skilled PT services to modify and progress therapeutic interventions, address ROM deficits, address strength deficits, analyze and address soft tissue restrictions, analyze and cue movement patterns, assess and modify postural abnormalities and instruct in home and community integration to attain remaining goals. Progress towards goals / Updated goals:  Short Term Goals: To be accomplished in 1 weeks:              1. Pt will be compliant with a HEP to improve CS function. Goal met. 8/7/19  Long Term Goals: To be accomplished in 8 weeks:              2. Pt will increase FOTO score by 12 pts to improve CS function.              2. Pt will increase CS Rotation >55 deg bilaterally to be able to improve with driving ability.             3. Pt will report CS pain <3/10 to ease with ADL's. Progressing.  3/10 prior to therapy this visit 8/8/19              4. Pt will report >70% improvement is sx to ease with ADL's.   PLAN  []  Upgrade activities as tolerated     [x]  Continue plan of care  []  Update interventions per flow sheet       []  Discharge due to:_  []  Other:_      Diandra Hogan, PT 8/20/2019  10:57 AM    Future Appointments   Date Time Provider Noy Meghan   8/20/2019 11:00 AM Srinivas Eduardo, PT [id number] [facility]   8/22/2019 10:30 AM Srinivas Eduardo PT [id number] [facility]   8/22/2019 11:00 AM Nadeen Montes, TAISHA [id number] [facility]   8/26/2019  1:45 PM Emely Siddiqui  [address]   8/27/2019 12:30 PM Chantelle Gonzalez PTA [id number] [facility]   8/27/2019  1:00 PM Sherif Whitaker PT [id number] [facility]   9/3/2019 10:00 AM Nadeen Montes, PT [id number] [facility]   9/3/2019 10:30 AM Chantelle Gonzalez PTA MMCPTPB [facility]   9/5/2019 10:00 AM Zuly Loomis, PT MMCPTPB [facility]   9/5/2019 10:30 AM Chantelle Gonzalez, PTA MMCPTPB [facility]   9/10/2019 10:00 AM Srinivas Eduardo, PT MMCPTPB [facility]   9/10/2019 10:30 AM Durand Closs, PT [id number] [address]   9/12/2019 10:30 AM Sheyla Neely, PT [id number] [address]   9/12/2019 11:00 AM Suma Wooten, PT MMCPTPB [address]

## 2019-08-20 NOTE — PROGRESS NOTES
PT DAILY TREATMENT NOTE 10-18    Patient Name: Moncho Montes De Oca  Date:2019  : 1950  [x]  Patient  Verified  Payor: [place] MEDICARE / Plan: [place] MEDICARE PART A & B / Product Type: Medicare /    In time:1100  Out time:1200  Total Treatment Time (min): 60  Visit #: 20 of 24    Medicare/BCBS Only   Total Timed Codes (min):  40 1:1 Treatment Time:  38       Treatment Area: Pain in right thigh [M79.651]  Radiculopathy, lumbar region [M54.16]  Other symptoms and signs involving the musculoskeletal system [R29.898]  Other abnormalities of gait and mobility [R26.89]    SUBJECTIVE  Pain Level (0-10 scale): 3/10  Any medication changes, allergies to medications, adverse drug reactions, diagnosis change, or new procedure performed?: [x] No    [] Yes (see summary sheet for update)  Subjective functional status/changes:   [] No changes reported  Patient states she is interested in dry needling for her low back as it is helping a lot with her heck symptoms. She is going to see neuro surgeon this week to discuss nerve ablation and further treatment options. OBJECTIVE    Modality rationale: decrease pain and increase tissue extensibility to improve the patients ability to increase ease of functional mobility.     Min Type Additional Details    [] Estim:  []Unatt       []IFC  []Premod                        []Other:  []w/ice   []w/heat  Position:  Location:    [] Estim: []Att    []TENS instruct  []NMES                    []Other:  []w/US   []w/ice   []w/heat  Position:  Location:    []  Traction: [] Cervical       []Lumbar                       [] Prone          []Supine                       []Intermittent   []Continuous Lbs:  [] before manual  [] after manual    []  Ultrasound: []Continuous   [] Pulsed                           []1MHz   []3MHz W/cm2:  Location:    []  Iontophoresis with dexamethasone         Location: [] Take home patch   [] In clinic   20 []  Ice     [x]  heat  []  Ice massage  []  Laser   [] Anodyne Position: sitting  Location: l/s and neck    []  Laser with stim  []  Other:  Position:  Location:    []  Vasopneumatic Device Pressure:       [] lo [] med [] hi   Temperature: [] lo [] med [] hi   [] Skin assessment post-treatment:  []intact []redness- no adverse reaction    []redness  adverse reaction:     25 min Therapeutic Exercise:  [x] See flow sheet :   Rationale: increase ROM and increase strength to improve the patients ability to increase ease of ambulation and daily tasks. 15 min Manual Therapy:  MFR/TPR right > left l/s paraspinals, left> right piriformis, shotgun technique to correct right AI   Rationale: decrease pain, increase ROM, increase tissue extensibility and decrease trigger points to improve ease of ADL's. With   [] TE   [] TA   [] neuro   [] other: Patient Education: [x] Review HEP    [] Progressed/Changed HEP based on:   [] positioning   [] body mechanics   [] transfers   [] heat/ice application    [] other:      Other Objective/Functional Measures:   Slight right AI corrected with MET   TTP right > left ls paraspinals and left piriformis> right  initiated modified plank per flow sheet; pt needing visual, tactile, and verbal cues to obtain correct form with PPT      Pain Level (0-10 scale) post treatment: 3/10 soreness    ASSESSMENT/Changes in Function: Patient is slowly progressing, but continues with mild pain in l/s. Pt is TTP in l/s paraspinals and piriformis. She reports relief, but increased soreness following manual intervention. Patient may benefit from dry needling to address trigger points. Patient with core weakness, challenged with modified plank.     Patient will continue to benefit from skilled PT services to modify and progress therapeutic interventions, address functional mobility deficits, address ROM deficits, address strength deficits, analyze and address soft tissue restrictions, analyze and cue movement patterns and analyze and modify body mechanics/ergonomics to attain remaining goals. [x]  See Plan of Care  []  See progress note/recertification  []  See Discharge Summary         Progress towards goals / Updated goals:  1. Pt will improve FOTO score by 8 points to show improvement with functional mobility performance. increased 4 points 8-2-19  2. Pt will report no more than 1-2/10 pain at worst to improve QOL.   Not met. Pt cont with 3-4/10 pain. 7/11/19.   3. Pt will improve B LEs strength at least 4/5 to improve ease and safety with amb. not met, 3-/5 with hips ext 8-2-19  4.  Pt will be independent with HEP to maximize ease with amb/ADLs    PLAN  []  Upgrade activities as tolerated     [x]  Continue plan of care  []  Update interventions per flow sheet       []  Discharge due to:_  []  Other:_      Mary Anders, PT 8/20/2019  10:46 AM    Future Appointments   Date Time Provider Noy Curryi   8/20/2019 11:00 AM Adrianne Whyte, PT [id number] [facility]   8/22/2019 10:30 AM Adrianne Whyte, PT [id number] [facility]   8/22/2019 11:00 AM Arden Smiley, PT [id number] [facility]   8/26/2019  1:45 PM Truman Peña  [address]   8/27/2019 12:30 PM Santiago Harrington PTA MMCPTPB [facility]   8/27/2019  1:00 PM Lois Low, PT [id number] [facility]   9/3/2019 10:00 AM Arden Smiley, PT [id number] [facility]   9/3/2019 10:30 AM Santiago Harrington PTA MMCPTPB [facility]   9/5/2019 10:00 AM Karina Alfredo, PT MMCPTPB [facility]   9/5/2019 10:30 AM Santiago Harrington PTA MMCPTPB [facility]   9/10/2019 10:00 AM Adrianne Whyte, PT MMCPTPB [facility]   9/10/2019 10:30 AM Arden Smiley, PT MMCPTPB [facility]   9/12/2019 10:30 AM Adrianne Whyte, PT [id number] [facility]   9/12/2019 11:00 AM Chele Saez, PT MMCPTPB [facility]

## 2019-08-21 NOTE — PROGRESS NOTES
Request for use of Dry Needling/Intramuscular Manual Therapy  Patient: Roya Landaverde     Referral Source: Jocelin Zarate MD  Diagnosis: Pain in right thigh [M79.651]  Radiculopathy, lumbar region [M54.16]  Other symptoms and signs involving the musculoskeletal system [R29.898]  Other abnormalities of gait and mobility [R26.89]      : 1950  Date of initial visit: 19  Attended visits: 20          Missed Visits: 3    Based on findings from the physical therapy examination and evaluation, the evaluating therapist believes the patientRoya  would benefit from including Dry Needling as part of the plan of care. Dry needling is a treatment technique utilized in conjunction with other PT interventions to inactivate myofascial trigger points and the pain and dysfunction they cause. Dry Needling is an advanced procedure that requires additional training including greater than 54 hours of intensive course work. Physical Therapists at [address] [place] [address] are trained and/or certified through [employer] for their education. PROCEDURE:   Solid filament sterile needle (typically 0.3mm/30 gauge) inserted into a trigger point   Repeated movements inactivate the trigger points, taking 30-60 seconds per site   Typically consists of 1 dry needling session per week and a possible second treatment including muscle re-education, flexibility, strengthening and other manual techniques to facilitate the benefits of dry needling     BENEFITS:   Inactivation of trigger points   Decreased pain   Increased muscle length   Improved movement patterns   Restoration of function POTENTIAL RISKS:   Post-needling soreness   Infection   Bruising/bleeding   Penetration of a nerve   Pneumothorax   All treating PTs have been thoroughly educated in avoiding adverse reactions    If you agree with this recommendation, please sign this form and fax it to us at [phone].   If you have questions or concerns regarding dry needling or any other treatment we may be providing, please contact us at [phone]    Thank you for allowing us to assist in the care of your patient. Kena Khan, PT    8/21/2019 1:51 PM     NOTE TO PHYSICIAN:  PLEASE COMPLETE THE ORDERS BELOW AND   FAX TO [facility]: [phone]  If you are unable to process this request in 24 hours please contact our office:   [phone]    I have read the above request and AGREE to the recommendation of including dry needling as part of the plan of care.     [de-identified] Signature:____________Date:_________TIME:________    [employer], Date and Time must be completed for valid certification **

## 2019-08-22 ENCOUNTER — HOSPITAL ENCOUNTER (OUTPATIENT)
Dept: PHYSICAL THERAPY | Age: 69
Discharge: HOME | End: 2019-08-22
Payer: MEDICARE

## 2019-08-22 PROCEDURE — 97110 THERAPEUTIC EXERCISES: CPT

## 2019-08-22 PROCEDURE — 97140 MANUAL THERAPY 1/> REGIONS: CPT

## 2019-08-22 NOTE — PROGRESS NOTES
PT DAILY TREATMENT NOTE 10-18    Patient Name: Yariel St  Date:2019  : 1950  [x]  Patient  Verified  Payor: [place] MEDICARE / Plan: [place] MEDICARE PART A & B / Product Type: Medicare /    In time:1030  Out time:1100  Total Treatment Time (min): 30  Visit #: 21 of 24    Medicare/BCBS Only   Total Timed Codes (min):  30 1:1 Treatment Time:  30       Treatment Area: Pain in right thigh [M79.651]  Radiculopathy, lumbar region [M54.16]  Other symptoms and signs involving the musculoskeletal system [R29.898]  Other abnormalities of gait and mobility [R26.89]    SUBJECTIVE  Pain Level (0-10 scale): 5/10 back  Any medication changes, allergies to medications, adverse drug reactions, diagnosis change, or new procedure performed?: [x] No    [] Yes (see summary sheet for update)  Subjective functional status/changes:   [] No changes reported  Pt reports she is having GI issues and cannot perform any exercises where she has to squeeze her butt today. OBJECTIVE        30 min Therapeutic Exercise:  [] See flow sheet :   Rationale: increase ROM and increase strength to improve the patients ability to increase ease of ADL's. With   [] TE   [] TA   [] neuro   [] other: Patient Education: [x] Review HEP    [] Progressed/Changed HEP based on:   [] positioning   [] body mechanics   [] transfers   [] heat/ice application    [] other:      Other Objective/Functional Measures:   Pt needing rest breaks and moving slowly due to GI issue  Increased reps for s/l abduction   Initiated TB IR/ER     Pain Level (0-10 scale) post treatment: 3/10    ASSESSMENT/Changes in Function: Patient making limited progress towards goals with fluctuating low back pain. She had decreased tolerance to there-ex today due to GI issue.      Patient will continue to benefit from skilled PT services to modify and progress therapeutic interventions, address ROM deficits, address strength deficits, analyze and address soft tissue restrictions and analyze and cue movement patterns to attain remaining goals. [x]  See Plan of Care  []  See progress note/recertification  []  See Discharge Summary         Progress towards goals / Updated goals:  1. Pt will improve FOTO score by 8 points to show improvement with functional mobility performance. increased 4 points 8-2-19  2. Pt will report no more than 1-2/10 pain at worst to improve QOL.   Not met. Pt cont with 3-4/10 pain. 7/11/19.   3. Pt will improve B LEs strength at least 4/5 to improve ease and safety with amb. not met, 3-/5 with hips ext 8-2-19  4.  Pt will be independent with HEP to maximize ease with amb/ADLs       PLAN  []  Upgrade activities as tolerated     [x]  Continue plan of care  []  Update interventions per flow sheet       []  Discharge due to:_  []  Other:_      Cliff Garrett, PT 8/22/2019  9:58 AM    Future Appointments   Date Time Provider Noy Curryi   8/22/2019 10:30 AM Ky Ser, PT MMCPTPB [facility]   8/22/2019 11:00 AM Apollo Johnson, PT AUFUZOV [facility]   8/26/2019  1:45 PM Brigitte Lind MD [facility]   8/27/2019 12:30 PM Lewis Rock, PTA MMCPTPB [facility]   8/27/2019  1:00 PM Ahmet Corado PT XOCCVBS [facility]   9/3/2019 10:00 AM Apollo Johnson, PT NLRNRQF [facility]   9/3/2019 10:30 AM Lewis Rock, PTA MMCPTPB [facility]   9/5/2019 10:00 AM Roseanna Zhong, PT MMCPTPB [facility]   9/5/2019 10:30 AM Lewis Rock, PTA MMCPTPB [facility]   9/10/2019 10:00 AM Ky Ser, PT MMCPTPB [facility]   9/10/2019 10:30 AM Apollo Johnson, PT MMCPTPB [facility]   9/12/2019 10:30 AM Ky Pereira, PT [id number] [facility]   9/12/2019 11:00 AM Juan Jose Styles, PT MMCPTPB [facility]

## 2019-08-22 NOTE — PROGRESS NOTES
PT DAILY TREATMENT NOTE 10-18    Patient Name: Chase Bill  Date:2019  : 1950  [x]  Patient  Verified   Payor: [place] MEDICARE / Plan: [place] MEDICARE PART A & B / Product Type: Medicare /    In time:1101  Out time:1136  Total Treatment Time (min): 33  Visit #: 7 of     Medicare/BCBS Only   Total Timed Codes (min):  33 1:1 Treatment Time:  33       Treatment Area: Neck pain [M54.2]    SUBJECTIVE  Pain Level (0-10 scale): 3/10  Any medication changes, allergies to medications, adverse drug reactions, diagnosis change, or new procedure performed?: [x] No    [] Yes (see summary sheet for update)  Subjective functional status/changes:   [] No changes reported  Reports Dry Neediing is heliping a lot. OBJECTIVE    25 min Therapeutic Exercise:  [] See flow sheet :   Rationale: increase ROM and increase strength to improve the patients ability to ease with ADL's    8 min Manual Therapy:  DTM, TPR to right UT and periscapula   Rationale: decrease pain, increase ROM and decrease trigger points to ease with driving       With   [] TE   [] TA   [] neuro   [] other: Patient Education: [x] Review HEP    [] Progressed/Changed HEP based on:   [] positioning   [] body mechanics   [] transfers   [] heat/ice application    [] other:      Other Objective/Functional Measures:  Before Manual: CS 48 right rotation, left=52 deg  After manual: CS rotation right= 68, left= 66 deg    Palpable UT/LS trigger point on the right >left. Pt requires cues to stay on task with Ex's. Pain Level (0-10 scale) post treatment: 1/10    ASSESSMENT/Changes in Function: Progressing well in therapy. CS measurements improved at end of session. Pt to continue with SNAGS for HEP.      Patient will continue to benefit from skilled PT services to modify and progress therapeutic interventions, address functional mobility deficits, address ROM deficits, address strength deficits, analyze and address soft tissue restrictions, analyze and cue movement patterns, analyze and modify body mechanics/ergonomics and assess and modify postural abnormalities to attain remaining goals. [x]  See Plan of Care  []  See progress note/recertification  []  See Discharge Summary         Progress towards goals / Updated goals:  Short Term Goals: To be accomplished in 1 weeks:              1. Pt will be compliant with a HEP to improve CS function. Goal met. 8/7/19  Long Term Goals: To be accomplished in 8 weeks:              7. Pt will increase FOTO score by 12 pts to improve CS function.              2. Pt will increase CS Rotation >55 deg bilaterally to be able to improve with driving ability. Met today. 8/22/19              3. Pt will report CS pain <3/10 to ease with ADL's. Progressing.  3/10 prior to therapy this visit 8/8/19              4. Pt will report >70% improvement is sx to ease with ADL's.     PLAN  [x]  Upgrade activities as tolerated     [x]  Continue plan of care  []  Update interventions per flow sheet       []  Discharge due to:_  []  Other:_      Yassine Bernard PT 8/22/2019  11:33 AM    Future Appointments   Date Time Provider Noy Christianson   8/26/2019  1:45 PM Kadi Porras  [address]   8/27/2019 12:30 PM Romelia Zafar PTA MMCPTPB [facility]   8/27/2019  1:00 PM Aarti Dubois, PT MMCPTPB [facility]   9/3/2019 10:00 AM Mary Villegas, PT [id number] [facility]   9/3/2019 10:30 AM Romelia Zafar PTA MMCPTPB [facility]   9/5/2019 10:00 AM Severo Nyhan, PT MMCPTPB [facility]   9/5/2019 10:30 AM Romelia Zafar PTA MMCPTPB [facility]   9/10/2019 10:00 AM Reinier Garrido, PT MMCPTPB [facility]   9/10/2019 10:30 AM Mary Villegas, PT MMCPTPB [facility]   9/12/2019 10:30 AM Reinier Garrido PT OPTLQXK [facility]   9/12/2019 11:00 AM TAISHA Goldstein [facility]

## 2019-08-22 NOTE — PROGRESS NOTES
PT DAILY TREATMENT NOTE 10-18    Patient Name: Jeramy Christopher  Date:2019  : 1950  [x]  Patient  Verified  Payor: [facility] / Plan: [place] MEDICARE PART A & B / Product Type: Medicare /    In time:***  Out time:***  Total Treatment Time (min): ***  Visit #: *** of ***    Medicare/BCBS Only   Total Timed Codes (min):  *** 1:1 Treatment Time:  ***       Treatment Area: Neck pain [M54.2]    SUBJECTIVE  Pain Level (0-10 scale): ***  Any medication changes, allergies to medications, adverse drug reactions, diagnosis change, or new procedure performed?: [x] No    [] Yes (see summary sheet for update)  Subjective functional status/changes:   [] No changes reported  Having a lot neck pain. DN helps with knots.      OBJECTIVE    Modality rationale: {BSHSI INMOTION MODALITIES:84226} to improve the patients ability to ***   Min Type Additional Details    [] Estim:  []Unatt       []IFC  []Premod                        []Other:  []w/ice   []w/heat  Position:  Location:    [] Estim: []Att    []TENS instruct  []NMES                    []Other:  []w/US   []w/ice   []w/heat  Position:  Location:    []  Traction: [] Cervical       []Lumbar                       [] Prone          []Supine                       []Intermittent   []Continuous Lbs:  [] before manual  [] after manual    []  Ultrasound: []Continuous   [] Pulsed                           []1MHz   []3MHz W/cm2:  Location:    []  Iontophoresis with dexamethasone         Location: [] Take home patch   [] In clinic    []  Ice     []  heat  []  Ice massage  []  Laser   []  Anodyne Position:  Location:    []  Laser with stim  []  Other:  Position:  Location:    []  Vasopneumatic Device Pressure:       [] lo [] med [] hi   Temperature: [] lo [] med [] hi   [] Skin assessment post-treatment:  []intact []redness- no adverse reaction    []redness  adverse reaction:     *** min []Eval                  []Re-Eval       *** min Therapeutic Exercise:  [] See flow sheet :   Rationale: {BSHSI IMMOTION THER EX:58465} to improve the patients ability to ***    *** min Therapeutic Activity:  []  See flow sheet :   Rationale: {BSHSI IMMOTION THER EX:63604}  to improve the patients ability to ***     *** min Neuromuscular Re-education:  []  See flow sheet :   Rationale: {BSHSI IMMOTION THER EX:76956}  to improve the patients ability to ***    *** min Manual Therapy:  ***   Rationale: {BSHSI IMMOTION MANUAL THERAPY:10035} to ***    *** min Gait Training:  ___ feet with ___ device on level surfaces with ___ level of assist   Rationale: With   [] TE   [] TA   [] neuro   [] other: Patient Education: [x] Review HEP    [] Progressed/Changed HEP based on:   [] positioning   [] body mechanics   [] transfers   [] heat/ice application    [] other:      Other Objective/Functional Measures: CS ro right 41 deg, left 38 dge     Pain Level (0-10 scale) post treatment: ***    ASSESSMENT/Changes in Function: ***    Patient will continue to benefit from skilled PT services to {[facility] INMOTION ASSESS[facility] STATEMENTS:20159} to attain remaining goals.      []  See Plan of Care  []  See progress note/recertification  []  See Discharge Summary         Progress towards goals / Updated goals:  ***    PLAN  []  Upgrade activities as tolerated     []  Continue plan of care  []  Update interventions per flow sheet       []  Discharge due to:_  []  Other:_      Concha King, PT 8/22/2019  11:08 AM    Future Appointments   Date Time Provider Noy Meghan   8/26/2019  1:45 PM Phuong Aguayo  [address]   8/27/2019 12:30 PM Waleska Andrea PTA MMCPTPB [address]   8/27/2019  1:00 PM Juan Nieto, PT MMCPTPB [address]   9/3/2019 10:00 AM Ellen Chapman, PT IVXVVWH [address]   9/3/2019 10:30 AM Waleska Andrea PTA MMCPTPB [address]   9/5/2019 10:00 AM Hodan Waters, PT MMCPTPB [address]   9/5/2019 10:30 AM [facility]   9/10/2019 10:00 AM Nai Barone PT [facility]   9/10/2019 10:30 AM Frank Stewart, PT JIKVPCM [facility]   9/12/2019 10:30 AM Bertrand Justice, TAISHA HOANMZCATRINA [facility]   9/12/2019 11:00 AM Erik Valadez, PT MMCPTPB [facility]

## 2019-08-26 ENCOUNTER — OFFICE VISIT (OUTPATIENT)
Dept: ORTHOPEDIC SURGERY | Age: 69
End: 2019-08-26

## 2019-08-26 VITALS
DIASTOLIC BLOOD PRESSURE: 78 MMHG | TEMPERATURE: 98.2 F | BODY MASS INDEX: 26.63 KG/M2 | HEART RATE: 94 BPM | SYSTOLIC BLOOD PRESSURE: 137 MMHG | HEIGHT: 67 IN | RESPIRATION RATE: 18 BRPM

## 2019-08-26 DIAGNOSIS — Z98.1 S/P LUMBAR FUSION: ICD-10-CM

## 2019-08-26 DIAGNOSIS — M79.18 CERVICAL MYOFASCIAL PAIN SYNDROME: ICD-10-CM

## 2019-08-26 DIAGNOSIS — M53.3 SACROILIAC PAIN: Primary | ICD-10-CM

## 2019-08-26 DIAGNOSIS — M54.59 MECHANICAL LOW BACK PAIN: ICD-10-CM

## 2019-08-26 DIAGNOSIS — M54.2 NECK PAIN: ICD-10-CM

## 2019-08-26 DIAGNOSIS — M54.50 LUMBAR SPINE PAIN: ICD-10-CM

## 2019-08-26 DIAGNOSIS — M25.551 RIGHT HIP PAIN: ICD-10-CM

## 2019-08-26 DIAGNOSIS — M79.18 MYOFASCIAL PAIN: ICD-10-CM

## 2019-08-26 NOTE — PROGRESS NOTES
[address]  [address], [address]  [place], [address] [place] [address] Street  Phone: [phone]  Fax: [phone]        Teja Bravo  : 1950  PCP: Rudy Fitch MD  2019    PROGRESS NOTE      HISTORY OF PRESENT ILLNESS  Lexi Zaldivar is a 77 y.o. female who was seen as a new patient 19 with c/o  neck, low back, right buttock, and right thigh pain with frequent falls. Pt notes that her RLE feels \"like a wet noodle,\" and she will fall without warning. She has seen Dr. Henrietta Wilkes for c/o left knee pain and low back pain, and he referred her to PT (19-current; [facility]). She had a lumbar fusion L2-S1 in 2015 after she slipped on ice while walking into work in 2015. She is also s/p right SI joint fixation/fusion. She also has a dx of osteoporosis and h/o parathyroidectomy. She reports a recent fall that she fell when she went to answer her door and suffered abrasions to her right arm and she hit her head. She notes that she has noted headaches, photophobia, and difficulty sleeping since this fall. Lumbar MRI 3/26/19: L2-S1: postoperative findings. L1-2: Mild diffuse disc bulging with 2 mm retrolisthesis. She sees Dr. Hilario Andino for pain management, and he provided a right trochanteric injection yesterday (19). She is an RN (previously a school nurse). Lexi Zaldivar comes in to the office today for f/u. She continunes to attend PT with dry needling and acupressure (19-19; [facility]) with benefit. She notes that she discussed the right SI joint RFA with Dr. Hilario Andino, but he recommended an injection first instead. She notes that she continues to have headaches. She notes that she saw Dr. Bibi Lazo (neurologist) for her post concussive syndrome and headaches, and he stated \"you do not want neck surgery. \" She rates her pain as a 4/10 today.     ASSESSMENT  Her pain appears due to sacroiliac pain on the right and pelvic pains compensatory for a potential right hip pathology. There is also likely a component of lumbar myofascial pain. Her neck pain is likely myofascial in nature. I offered a referral to another neurologist for post-concussive syndrome. I advised she may want to discuss the option of Botox injections with Dr. Kasey Christensen. PLAN  1. Added to PT referral to include dry needling for lumbar region. 2. Advised she f/u with Dr. Vincent Watson for evaluate a right hip pathology as she had R thigh pain reproduced with internal rotation of the R hip.   3. F/u with neurology for headaches - discuss option of Botox. Pt will f/u in 6 weeks or sooner as needed. Diagnoses and all orders for this visit:    1. Sacroiliac pain  -     REFERRAL TO PHYSICAL THERAPY    2. Mechanical low back pain  -     REFERRAL TO PHYSICAL THERAPY    3. Myofascial pain  -     REFERRAL TO PHYSICAL THERAPY    4. Cervical myofascial pain syndrome  -     REFERRAL TO PHYSICAL THERAPY    5. Right hip pain  -     REFERRAL TO PHYSICAL THERAPY    6. Neck pain  -     REFERRAL TO PHYSICAL THERAPY    7. S/P lumbar fusion  -     REFERRAL TO PHYSICAL THERAPY    8. Lumbar spine pain  -     REFERRAL TO PHYSICAL THERAPY       PAST MEDICAL HISTORY   Past Medical History:   Diagnosis Date    Cancer [facility])     SKIN    Neuropathy     Psychiatric disorder     DEPRESSION    Sacroiliitis ([facility] [address]        Past Surgical History:   Procedure Laterality Date    ABDOMEN SURGERY PROC UNLISTED      GASTROPLASTY    BREAST SURGERY PROCEDURE UNLISTED      MASTOPLASTY    HX ABDOMINOPLASTY      HX BACK SURGERY      HX CHOLECYSTECTOMY      HX HEENT      BLEPHAROPLASTY    HX HEENT      FOREHEAD LIFT    HX HYSTERECTOMY      HX KNEE ARTHROSCOPY      HX OTHER SURGICAL      EXC OF MELANOMA    HX TONSILLECTOMY     . MEDICATIONS    Current Outpatient Medications   Medication Sig Dispense Refill    DULoxetine (CYMBALTA) 60 mg capsule Take 60 mg by mouth.  tiZANidine (ZANAFLEX) 2 mg tablet Take 4 mg by mouth.  HYDROcodone-acetaminophen (NORCO)  mg tablet Take 1 Tab by mouth.  famotidine (PEPCID) 20 mg tablet Take 1 Tab by mouth two (2) times a day. (Patient not taking: Reported on 7/9/2019) 30 Tab 0    Lactobacillus Acidoph & Bulgar (FLORANEX) 1 million cell tab tablet Take 2 Tabs by mouth two (2) times a day. 30 Tab 0    amLODIPine (NORVASC) 2.5 mg tablet Take 2 Tabs by mouth daily. (Patient not taking: Reported on 7/9/2019) 30 Tab 0    calcium-cholecalciferol, d3, (CALCIUM 600 + D) 600-125 mg-unit tab Take 1 Tab by mouth daily.  PYRIDOXINE HCL (VITAMIN B-6 PO) Take 1 Tab by mouth daily.  gabapentin (NEURONTIN) 300 mg capsule TAKE 1 CAPSULE BY MOUTH EVERY MORNING, 2 CAPSULES EVERY AFTERNOON, AND 3 CAPSULES AT NIGHT  4    citalopram (CELEXA) 40 mg tablet Take 40 mg by mouth daily.  melatonin tab tablet Take  by mouth nightly.  alendronate (FOSAMAX) 70 mg tablet Take 70 mg by mouth every seven (7) days. ALLERGIES  No Known Allergies       SOCIAL HISTORY    Social History     Socioeconomic History    Marital status:      Spouse name: Not on file    Number of children: Not on file    Years of education: Not on file    Highest education level: Not on file   Tobacco Use    Smoking status: Never Smoker    Smokeless tobacco: Never Used   Substance and Sexual Activity    Alcohol use: No    Drug use: No       FAMILY HISTORY  Family History   Problem Relation Age of Onset    Diabetes Mother     Heart Disease Mother          REVIEW OF SYSTEMS  Review of Systems   Musculoskeletal: Positive for back pain, falls and neck pain. Right thigh pain    Neurological: Positive for headaches. PHYSICAL EXAMINATION  There were no vitals taken for this visit. Pain Assessment  7/9/2019   Location of Pain Back   Location Modifiers Inferior   Severity of Pain 4   Quality of Pain Throbbing; Sharp   Duration of Pain Persistent   Frequency of Pain Constant   Aggravating Factors Bending;Standing;Walking;Stretching   Limiting Behavior Yes   Relieving Factors NSAID   Result of Injury Yes   Work-Related Injury No   Type of Injury Fall           Constitutional:  Well developed, well nourished, in no acute distress. Psychiatric: Affect and mood are appropriate. Integumentary: No rashes or abrasions noted on exposed areas. SPINE/MUSCULOSKELETAL EXAM    Cervical spine:  Neck is midline. Normal muscle tone. No focal atrophy is noted. ROM pain free. Shoulder ROM intact. Tenderness to palpation of cervical paraspinals. Negative Spurling's sign. Negative Tinel's sign. Negative Valverde's sign. Sensation in the bilateral arms grossly intact to light touch.      Lumbar spine:  No rash, ecchymosis, or gross obliquity. No fasciculations. No focal atrophy is noted. No pain with hip ROM. Full range of motion. Tenderness to palpation. No tenderness to palpation at the sciatic notch. SI joints tender bilaterally (R>L). Trochanters non tender. Piriformis tender on the R.     Sensation in the bilateral legs grossly intact to light touch.     R thigh pain reproduced with internal rotation of the R hip. Negative Babinski. MOTOR:      Biceps  Triceps Deltoids Wrist Ext Wrist Flex Hand Intrin   Right 5/5 5/5 5/5 5/5 5/5 5/5   Left 5/5 5/5 5/5 5/5 5/5 5/5             Hip Flex  Quads Hamstrings Ankle DF EHL Ankle PF   Right 5/5 5/5 5/5 5/5 5/5 5/5   Left 5/5 5/5 5/5 5/5 4+/5 5/5     DTRs are 2+ biceps, triceps, brachioradialis, patella, and Achilles.     Negative Straight Leg raise. Squat not tested. No difficulty with tandem gait.      Ambulation without assistive device.  FWB.       RADIOGRAPHS  Cervical XR images taken on 7/9/19 personally reviewed with patient:  Disc space narrowing C5-6, C6-7  Straightening of the cervical lordosis  No obvious compression fractures or instabilities     Lumbar MRI images taken on 3/26/19 personally reviewed with patient:  General observations: Postoperative findings. Metallic artifact related to posterior pedicle screw fixation and connecting rods L2-S1. Fluid collection at the laminectomy sites measuring approximately 9 mm from superior to inferior, 1.5 cm from anterior to posterior, and 3 cm from medial to lateral.  Alignment: L2-3: 3 mm anterior subluxation versus posterior angulation of the posterior-superior margin of L3. Vertebral bodies: L3 anterior wedge deformity, chronic. Approximately 50% loss of height anteriorly. L1 subtle anterior wedge deformity, considered within normal limits at this level. No bone marrow edema is evident to indicate recent compression fracture. L5-S1: Normal disc height. Mild disc desiccation. Minimal disc bulging. Wide posterior decompression. L4-5: Moderately severe disc narrowing. Mild disc desiccation. Wide posterior decompression. Minimal disc bulging. L3-4: Normal disc height and hydration. Mild diffuse annular bulging. Wide posterior decompression. L2-3: Normal disc height and hydration. Mild-moderate diffuse disc bulging. Wide posterior decompression. L1-2: Normal disc height and hydration. Mild diffuse disc bulging. 2 mm retrolisthesis. Conus termination: L1-2. Lower thoracic spine: Within normal limits. Upper sacrum: right sacroiliac joint fixation device. Comparison: little interval change.     IMPRESSION:  L2-S1: postoperative findings. L1-2: Mild diffuse disc bulging with 2 mm retrolisthesis. Little interval change compared to the most recent previous MRI study performed here March 24., 2016.    22 minutes of face-to-face contact were spent with the patient during today's visit extensively discussing symptoms and treatment plan. All questions were answered.  More than half of this visit today was spent on counseling.      Written by Ngoc Subramanian as dictated by Gui Lin MD

## 2019-08-27 ENCOUNTER — HOSPITAL ENCOUNTER (OUTPATIENT)
Dept: PHYSICAL THERAPY | Age: 69
Discharge: HOME | End: 2019-08-27
Payer: MEDICARE

## 2019-08-27 PROCEDURE — 97110 THERAPEUTIC EXERCISES: CPT

## 2019-08-27 PROCEDURE — 97140 MANUAL THERAPY 1/> REGIONS: CPT

## 2019-08-27 PROCEDURE — 97164 PT RE-EVAL EST PLAN CARE: CPT

## 2019-08-27 NOTE — PROGRESS NOTES
PT DAILY TREATMENT NOTE 10-18    Patient Name: Aggie Breaux  Date:2019  : 1950  [x]  Patient  Verified  Payor: [place] MEDICARE / Plan: [place] MEDICARE PART A & B / Product Type: Medicare /    In time: 1:10  Out time: 1:45  Total Treatment Time (min): 35  Visit #: 8 of 16    Medicare/BCBS Only   Total Timed Codes (min):  25 1:1 Treatment Time:  35       Treatment Area: Neck pain [M54.2]    SUBJECTIVE  Pain Level (0-10 scale): 4/10  Any medication changes, allergies to medications, adverse drug reactions, diagnosis change, or new procedure performed?: [x] No    [] Yes (see summary sheet for update)  Subjective functional status/changes:   [] No changes reported  Pt reports that she believes therapy is helping. She is experiencing relief with the dry needling and reports 45% overall improvement since start of care. Her greatest remaining complaint is continued daily headaches. The headaches last until she takes pain medication and takes a nap. She is taking medication for the pain daily. The pain is more on the right today than the left.      OBJECTIVE    Modality rationale: decrease pain and increase tissue extensibility to improve the patients ability to tolerate daily tasks   Min Type Additional Details    [] Estim:  []Unatt       []IFC  []Premod                        []Other:  []w/ice   []w/heat  Position:  Location:    [] Estim: []Att    []TENS instruct  []NMES                    []Other:  []w/US   []w/ice   []w/heat  Position:  Location:    []  Traction: [] Cervical       []Lumbar                       [] Prone          []Supine                       []Intermittent   []Continuous Lbs:  [] before manual  [] after manual    []  Ultrasound: []Continuous   [] Pulsed                           []1MHz   []3MHz W/cm2:  Location:    []  Iontophoresis with dexamethasone         Location: [] Take home patch   [] In clinic   10 []  Ice     [x]  heat  []  Ice massage  []  Laser   []  Anodyne Position: supine  Location: l/s    []  Laser with stim  []  Other:  Position:  Location:    []  Vasopneumatic Device Pressure:       [] lo [] med [] hi   Temperature: [] lo [] med [] hi   [x] Skin assessment post-treatment:  [x]intact []redness- no adverse reaction    []redness  adverse reaction:   Heat for lumbar spine (other chart) performed after this session    10 minutes Re-Eval     15 min Manual Therapy:  Dry needling (see below), SOR, DTM B cervical paraspinals and UT   Rationale: decrease pain, increase ROM, increase tissue extensibility and decrease trigger points to reduce headaches and improve ease of daily tasks    Dry Needling Procedure Note    Procedure: An intramuscular manual therapy (dry needling) and a neuro-muscular re-education treatment was done to deactivate myofascial trigger points with a 30 gauge filament needle under aseptic technique. Indications:  [x] Myofascial pain and dysfunction [] Muscled spasms  [] Myalgia/myositis   [] Muscle cramps  [] Muscle imbalances  [] Temporomandibular Dysfunction  [] Other:    Chart reviewed for the following:  Yousuf OREILLY PT, have reviewed the medical history, summary list and precautions/contraindications for [employer].   TIME OUT performed immediately prior to start of procedure:  Yousuf OREILLY PT, have performed the following reviews on [employer] prior to the start of the session:      [x] Verified patient identification by name and date of birth    [x] Agreement on all muscles being treated was verified   [x] Purpose of dry needling, side effects, possible complications, risks and benefits were explained to the patient   [x] Procedure site(s) verified  [x] Patient was positioned for comfort and draped for privacy  [x] Informed Consent was signed (initial visit) and verified verbally (subsequent visits)  [x] Patient was instructed on the need to report the use of blood thinners and/or immunosuppressant medications  [x] How to respond to possible adverse effects of treatment  [x] Self treatment of post needling soreness: ice, heat (moist heat, heat wraps) and stretching  [x] Opportunity was given to ask any questions, all questions were answered            Time: 1:17 - 1:23 (needling time 2 minutes)  Date of procedure: 8/27/2019  Treatment: The following muscles were treated today with intramuscular dry needling  [] Left [] Right Masster  [] Left [] Right Temporalis  [] Left [] Right Zygomaticus Major / Minor  [] Left [] Right Lateral Pterygoid  [] Left [] Right Medial Pterygoid  [] Left [] Right Digastric Post / Anterior Belly  [] Left [] Right Sternocleidomastoid  [] Left [] Right Scalene Anterior / Medial / Posterior  [] Left [] Right Extra Laryngeal Muscles  [] Left [] Right Upper Trapezius  [] Left [] Right Middle Trapezius  [] Left [] Right Lower Trapezius  [] Left [] Right Oblique Capitis Inferior  [] Left [] Right Splenius Capitis / Cervicis  [] Left [] Right Semispinalis: Capitis / Cervicis  [] Left [] Right Multifidi / Rotatores Cervicis / Thoracic  [] Left [] Right Longissimus Thoracis / Illiocostalis  [] Left [] Right Levator Scapulae  [] Left [x] Right  Infraspinatus  [] Left [] Right Teres Major / Minor  [] Left [] Right Rhomboids / Serratus posterior superior  [] Left [] Right Pectoralis Major / Minor  [] Left [] Right Serratus Anterior  [] Left [] Right Latissimus Dorsi  [] Left [] Right Subscapularis  [] Left [] Right Coracobrachialis  [] Left [] Right Biceps Brachii  [] Left [] Right Deltoid: Anterior / Medial / Posterior  [] Left [] Right Brachialis  [] Left [] Right Triceps  [] Left [] Right Brachioradialis  [] Left [] Right Extensor Carpi Radialis Brevis / Extensor Carpi Radialis Longus    [] Left [] Right  Extensor digitorum  [] Left [] Right Supinator / Pronator Teres  [] Left [] Right Flexor Carpi Radialis/ Flexor Carpi Ulnaris   [] Left [] Right  Flexor Digitorum Superficialis/ Flexor Digitorum Profundus  [] Left [] Right Flexor Pollicis Longus / Flexor Pollicis Brevis / Palmaris Longus  [] Left [] Right Abductor Pollicis Longus / Abductor Pollicis Brevis  [] Left [] Right Opponens Pollicis / Adductor Pollicis  [] Left [] Right Dorsal / Palmar Interossei / Lumbricalis  [] Left [] Right Abductor Digiti Minimi / Opponens Digiti Minimi    Patient's response to today's treatment:  [x] Latent Twitch Response     [x] Muscle relaxation [x] Pain Relief  [x] Post needling soreness    [] without complications  [] Increased Range of Motion   [] Decreased headaches    [] Decreased Tinnitus  [] Other:     Performed by: Yasemin Augustin, PT          With   [] TE   [] TA   [] neuro   [] other: Patient Education: [x] Review HEP    [] Progressed/Changed HEP based on:   [] positioning   [] body mechanics   [] transfers   [] heat/ice application    [] other:      Other Objective/Functional Measures:     Cervical Rotation AROM:  Left 65*,  Right 70*     Trigger point right latissimus dorsi and mid trap, trigger points in these muscles were no longer evident following dry needling to right infraspinatus    Minimal tenderness and no trigger points noted B UT this visit   Headache decreased with manual     Completed FOTO with therapist during heat     Pain Level (0-10 scale) post treatment: 2.5/10     ASSESSMENT/Changes in Function: See Progress Note    Patient will continue to benefit from skilled PT services to modify and progress therapeutic interventions, address ROM deficits, address strength deficits, analyze and address soft tissue restrictions, analyze and cue movement patterns, assess and modify postural abnormalities and instruct in home and community integration to attain remaining goals. []  See Plan of Care  [x]  See progress note/recertification  []  See Discharge Summary         Progress towards goals / Updated goals:  Short Term Goals: To be accomplished in 1 weeks:              1.  Pt will be compliant with a HEP to improve CS function. Goal met. 8/7/19  Long Term Goals: To be accomplished in 8 weeks:              3. Pt will increase FOTO score by 12 pts to improve CS function. Not met. Declined 7 points 8/27/19               2. Pt will increase CS Rotation >55 deg bilaterally to be able to improve with driving ability. Met today. 8/22/19              3. Pt will report CS pain <3/10 to ease with ADL's. Progressing.  4/10 max pain within the past week, average pain, average pain 2/10, least pain 1/10 8/27/19              4. Pt will report >70% improvement is sx to ease with ADL's. Progressing.   Reports 45% overall improvement 8/27/19     PLAN  []  Upgrade activities as tolerated     [x]  Continue plan of care  []  Update interventions per flow sheet       []  Discharge due to:_  []  Other:_      Yodit Martinez, PT 8/27/2019  1:02 PM    Future Appointments   Date Time Provider Noy Christianson   9/3/2019 10:00 AM Emma Bernabe, PT [facility]   9/3/2019 10:30 AM Jean Rosa, ROB [facility]   9/5/2019 10:00 AM Delia Salazar, PT [facility]   9/5/2019 10:30 AM Jean Rosa, ROB [facility]   9/10/2019 10:00 AM Andi Smith, PT [facility]   9/10/2019 10:30 AM Emma Bernabe PT [facility]   9/12/2019 10:30 AM Andi Smith, PT [facility]   9/12/2019 11:00 AM Andi Smith, PT [id number]   10/14/2019  1:15 PM Liz Morrell  [address]

## 2019-08-27 NOTE — PROGRESS NOTES
[facility]  [employer] [facility]  [place]  [address] [place] [address]  [phone] fax    Continued Plan of Care/ Re-certification for Physical Therapy Services    Patient name: Aggie Breaux Start of Care: 19   Referral source: Kadi Porras MD : 1950   Medical/Treatment Diagnosis: Neck pain [M54.2]  Payor: Gavin Bland / Plan: [place] MEDICARE PART A & B / Product Type: Medicare /  Onset Date:7 weeks ago     Prior Hospitalization: see medical history Provider#: [id number]   Medications: Verified on Patient Summary List    Comorbidities: Depression,  osteoporosis, back surgery (l/s fusion) in 2015, DDD. Prior Level of Function: mod Ind with all mobility, several steps to enter house, no falling. Was previously working as a nurse. Visits from Start of Care: 8    Missed Visits: 0    The Plan of Care and following information is based on the patient's current status:  Goal: Pt will be compliant with a HEP to improve CS function. Status at last note/certification: Goal met. Current Status: met    Goal:  Pt will increase FOTO score by 12 pts to improve CS function. Status at last note/certification: Not met. Declined 7 points   Current Status: not met    Goal: Pt will increase CS Rotation >55 deg bilaterally to be able to improve with driving ability. Status at last note/certification: Goal met. Left 65*,  Right 70*     Current Status: met    Goal: Pt will report CS pain <3/10 to ease with ADL's. Status at last note/certification: Progressing.  4/10 max pain within the past week, average pain, average pain 2/10, least pain 1/10  Current Status: not met    Goal:Pt will report >70% improvement is sx to ease with ADL's. Status at last note/certification: Progressing.   Reports 45% overall improvement  Current Status: not met    Key functional changes: slowly reducing pain levels, improving cervical ROM,      Problems/ barriers to goal attainment: late arrival to therapy sessions     Problem List: pain affecting function, decrease ROM, decrease strength, decrease ADL/ functional abilitiies, decrease activity tolerance and decrease flexibility/ joint mobility    Treatment Plan: Therapeutic exercise, Therapeutic activities, Neuromuscular re-education, Physical agent/modality, Gait/balance training, Manual therapy, Patient education, Self Care training, Functional mobility training, Home safety training and Stair training     Patient Goal (s) has been updated and includes: less headaches      Goals for this certification period to be accomplished in 8 weeks:  1. Pt will increase FOTO score by 12 pts to improve CS function. 2. Pt will report </= 3 headaches per week in order to improve QOL. 3. Pt will report CS pain <3/10 to ease with ADL's.   4. Pt will report >70% improvement is sx to ease with ADL's    Frequency / Duration: Patient to be seen 2-3 times per week for 8 weeks:    Assessment / Recommendations: Pt is making slow, steady progress in therapy, meeting 2/5 initial goals and progressing with pain and % improvement goals. FOTO score has declined despite subjective reports of 45% overall improvement. She reports decreasing cervicalgia but continues to report daily headaches that are only relieved with pain medication and taking a nap. Pt continues to report relief with dry needling and cervical AROM is steadily improving. Pt will benefit from continued skilled PT to address remaining strength, ROM, flexibility, and postural deficits in order to reduce pain with daily tasks and improve QOL. Certification Period: 8/27/19 to 10/24/19    Radhavickie Martinez, PT 8/27/2019 1:06 PM    ________________________________________________________________________  I certify that the above Therapy Services are being furnished while the patient is under my care. I agree with the treatment plan and certify that this therapy is necessary.     [] I have read the above and request that my patient continue as recommended.   [] I have read the above report and request that my patient continue therapy with the following changes/special instructions: _______________________________________  [] I have read the above report and request that my patient be discharged from therapy    Physician's Signature:____________Date:_________TIME:________    ** Signature, Date and Time must be completed for valid certification **    Please sign and return to [facility] [employer] [facility] [employer]  [address] [place] [address]  [phone] fax

## 2019-08-27 NOTE — PROGRESS NOTES
PT DAILY TREATMENT NOTE 10-18    Patient Name: Mejia Reaves  Date:2019  : 1950  [x]  Patient  Verified  Payor: [place] MEDICARE / Plan: [place] MEDICARE PART A & B / Product Type: Medicare /    In time:12:45  Out time:1:10  Total Treatment Time (min): 25  Visit #: 22 of 24    Medicare/BCBS Only   Total Timed Codes (min):  25 1:1 Treatment Time:  23       Treatment Area: Pain in right thigh [M79.651]  Radiculopathy, lumbar region [M54.16]  Other symptoms and signs involving the musculoskeletal system [R29.898]  Other abnormalities of gait and mobility [R26.89]    SUBJECTIVE  Pain Level (0-10 scale): 3/10 l/s 4/10 c/s  Any medication changes, allergies to medications, adverse drug reactions, diagnosis change, or new procedure performed?: [x] No    [] Yes (see summary sheet for update)  Subjective functional status/changes:   [] No changes reported  Pt reports she is going to try dry needling for her back and piriformis. She continues to note increased LBP and tightness. OBJECTIVE    15 min Therapeutic Exercise:  [] See flow sheet :   Rationale: increase ROM and increase strength to improve the patients ability to increase ease of ADLs.     10 minutes for l/s STM and TPR to the multifidi to decrease pain for ease of ambulation         With   [] TE   [] TA   [] neuro   [] other: Patient Education: [x] Review HEP    [] Progressed/Changed HEP based on:   [] positioning   [] body mechanics   [] transfers   [] heat/ice application    [] other:      Other Objective/Functional Measures:   Educated on trying dry needling next session for the back if agreeable to try with [facility] since [employer] will be out; pt agreeable  Multiple TPs in l/s paraspinals and piriformis  Educated on use of theracane for low back  Pt very talkative during session and 15 minutes late     Pain Level (0-10 scale) post treatment: 310    ASSESSMENT/Changes in Function: Pt is making slow progress towards goals with continued increased low back and hip pain. She has multiple TPs in the l/s paraspinals due to inability to stretch from fusion. Will trial a period of dry needling to reduce muscle tension while continuing to strengthen the core/hips for decreased pain with sitting and walking. Progress towards goals / Updated goals:  1. Pt will improve FOTO score by 8 points to show improvement with functional mobility performance. increased 4 points 8-2-19  2. Pt will report no more than 1-2/10 pain at worst to improve QOL.   Not met. Pt cont with 3-4/10 pain. 7/11/19.   3. Pt will improve B LEs strength at least 4/5 to improve ease and safety with amb. not met, 3-/5 with hips ext 8-2-19  4.  Pt will be independent with HEP to maximize ease with amb/ADLs       PLAN  [x]  Upgrade activities as tolerated     [x]  Continue plan of care  []  Update interventions per flow sheet       []  Discharge due to:_  []  Other:_      Jose Guadalupe Foley PTA 8/27/2019  9:58 AM    Future Appointments   Date Time Provider Noy Christianson   8/27/2019 12:30 PM Lewis Rock PTA MMCPTPB [facility]   8/27/2019  1:00 PM Ahmet Corado, PT [id number] [facility]   9/3/2019 10:00 AM Apollo Johnson, PT [id number] [facility]   9/3/2019 10:30 AM Lewis Rock PTA [facility]   9/5/2019 10:00 AM Roseanna Zhong PT [facility]CP[facility]   9/5/2019 10:30 AM Lewis Rock PTA [facility]   9/10/2019 10:00 AM Ky Ser, PT [facility]CP[facility]   9/10/2019 10:30 AM Apollo Johnson PT [facility]   9/12/2019 10:30 AM Dallas Ser, PT [email or web address] [facility]   9/12/2019 11:00 AM Ky Ser, PT [id number] [facility]   10/14/2019  1:15 PM Brigitte Lind  [address]

## 2019-08-29 ENCOUNTER — APPOINTMENT (OUTPATIENT)
Dept: PHYSICAL THERAPY | Age: 69
End: 2019-08-29
Payer: MEDICARE

## 2019-09-03 ENCOUNTER — HOSPITAL ENCOUNTER (OUTPATIENT)
Dept: PHYSICAL THERAPY | Age: 69
Discharge: HOME | End: 2019-09-03
Payer: MEDICARE

## 2019-09-03 PROCEDURE — 97110 THERAPEUTIC EXERCISES: CPT

## 2019-09-03 PROCEDURE — 97140 MANUAL THERAPY 1/> REGIONS: CPT

## 2019-09-03 NOTE — PROGRESS NOTES
PT DAILY TREATMENT NOTE 10-18    Patient Name: Grace Allred  Date:9/3/2019  : 1950  [x]  Patient  Verified  Payor: VA MEDICARE / Plan: VA MEDICARE PART A & B / Product Type: Medicare /    In time:  Out time:103  Total Treatment Time (min): 29  Visit #: 9 of 16     Medicare/BCBS Only   Total Timed Codes (min):  29 1:1 Treatment Time:  29       Treatment Area: Neck pain [M54.2]    SUBJECTIVE  Pain Level (0-10 scale): 4/10  Any medication changes, allergies to medications, adverse drug reactions, diagnosis change, or new procedure performed?: [x] No    [] Yes (see summary sheet for update)  Subjective functional status/changes:   [] No changes reported  Reports she forgot her pain meds at her sister in [place]    OBJECTIVE      21 min Therapeutic Exercise:  [] See flow sheet :   Rationale: increase ROM and increase strength to improve the patients ability to ease with ADL's    8 min Manual Therapy:  DTM andTPR bilateral UT with FELA   Rationale: decrease pain, increase ROM and decrease trigger points to ease with ADL's       With   [] TE   [] TA   [] neuro   [] other: Patient Education: [x] Review HEP    [] Progressed/Changed HEP based on:   [] positioning   [] body mechanics   [] transfers   [] heat/ice application    [] other:      Other Objective/Functional Measures:during SNAGS  right cs rotation=70 deg, Left CS rotation=68 deg. Palpable trigger points to bilateral UT. Pain Level (0-10 scale) post treatment: 4/10    ASSESSMENT/Changes in Function: Progressing slowly. Pt reports having a HA at the end of todays session. She has no pain meds but should be getting it soon. Pt continues to demonstrate CS ROM to [facility].     Patient will continue to benefit from skilled PT services to modify and progress therapeutic interventions, address functional mobility deficits, address ROM deficits, address strength deficits, analyze and address soft tissue restrictions, analyze and cue movement patterns, analyze and modify body mechanics/ergonomics, assess and modify postural abnormalities and address imbalance/dizziness to attain remaining goals. [x]  See Plan of Care  []  See progress note/recertification  []  See Discharge Summary         Progress towards goals / Updated goals:  1. Pt will increase FOTO score by 12 pts to improve CS function. 2. Pt will report </= 3 headaches per week in order to improve QOL.      3. Pt will report CS pain <3/10 to ease with ADL's.   4. Pt will report >70% improvement is sx to ease with ADL's    PLAN  [x]  Upgrade activities as tolerated     [x]  Continue plan of care  []  Update interventions per flow sheet       []  Discharge due to:_  []  Other:_      Concha King, PT 9/3/2019  10:07 AM    Future Appointments   Date Time Provider Noy Christianson   9/3/2019 10:30 AM Waleska Andrea, ROB MMCPTPB [facility]   9/5/2019 10:00 AM Divya Davies, PT [facility]CP[facility]   9/5/2019 10:30 AM Waleska Andrea, PTA [facility]CP[facility]   9/10/2019 10:00 AM Nai Barone, PT [id number] [facility]   9/10/2019 10:30 AM Ellen Chapman, PT MMCPTPB [facility]   9/12/2019 11:00 AM Divya Davies, PT [facility]CP[facility]   9/12/2019 11:30 AM Divya Davies, PT [facility]CP[facility]   10/14/2019  1:15 PM Phuong Aguayo  [address]

## 2019-09-03 NOTE — PROGRESS NOTES
PT DAILY TREATMENT NOTE 10-18    Patient Name: Roya Landaverde  Date:9/3/2019  : 1950  [x]  Patient  Verified  Payor: VA MEDICARE / Plan: VA MEDICARE PART A & B / Product Type: Medicare /    In time: 10:32  Out time:11:00  Total Treatment Time (min):28   Visit #: 23 of 24    Medicare/BCBS Only   Total Timed Codes (min):  28 1:1 Treatment Time: 24        Treatment Area: Pain in right thigh [M79.651]  Radiculopathy, lumbar region [M54.16]  Other symptoms and signs involving the musculoskeletal system [R29.898]  Other abnormalities of gait and mobility [R26.89]    SUBJECTIVE  Pain Level (0-10 scale): 4/10 l/s and 5/10 c/s  Any medication changes, allergies to medications, adverse drug reactions, diagnosis change, or new procedure performed?: [x] No    [] Yes (see summary sheet for update)  Subjective functional status/changes:   [] No changes reported  Pt reports a headache after her last session and declines certain exercises. She states average pain is 4/10. She is no longer having pins/needles/numbness in the front of her right thigh like she was having before. She is looking forward to the dry needling for her back/hip. She continues to have difficulty with prolonged walking and chores like vacuuming. OBJECTIVE    28 min Therapeutic Exercise:  [] See flow sheet :   Rationale: increase ROM and increase strength to improve the patients ability to increase ease of ADLs. With   [] TE   [] TA   [] neuro   [] other: Patient Education: [x] Review HEP    [] Progressed/Changed HEP based on:   [] positioning   [] body mechanics   [] transfers   [] heat/ice application    [] other:      Other Objective/Functional Measures:    FOTO: 42  Average Pain: 4/10  Hip extension MMT: right 3/5  Left 3/5  Hip abduction MMT: right 3+/5 left 4-/5  Hip adduction MMT: right 3+/5 left 3+/5  Hip flexion MMT: right 4+/5 left 4+/5  Hip IR MMT: right 4-/5  left 4/5  Hip ER MMT: right 4-5 left 4/5      Pain Level (0-10 scale) post treatment: 4/10    ASSESSMENT/Changes in Function: Pt making slow progress towards goals with limited improvement by only 2 points on her FOTO since [facility]. She is averaging 4/10 pain in her back and hip. She is no longer experiencing radicular symptoms into the anterior right thigh but continues to have hypertonicity in the right glute/piriformis and l/s paraspinals contributing to increased myofascial pain. Will attempt dry needling to assist with reducing pain along with exercises and then if no progress Is made refer back to MD at that time. Progress towards goals / Updated goals:  1. Pt will improve FOTO score by 8 points to show improvement with functional mobility performance. increased 4 points 8-2-19  Only 2 point improvement since [facility] (9/3/19)  2. Pt will report no more than 1-2/10 pain at worst to improve QOL.   Not met. Pt cont with 3-4/10 pain. 7/11/19. 4/10 average pain (9/3/19)  3. Pt will improve B LEs strength at least 4/5 to improve ease and safety with amb. not met, 3-/5 with hips ext 8-2-19   Not met Hip extension MMT: right 3/5  Left 3/5  Hip abduction MMT: right 3+/5 left 4-/5  Hip adduction MMT: right 3+/5 left 3+/5  Hip flexion MMT: right 4+/5 left 4+/5  Hip IR MMT: right 4-/5  left 4/5  Hip ER MMT: right 4-5 left 4/5  4. Pt will be independent with HEP to maximize ease with amb/ADLs.  Not yet initiated final HEP (9/3/19)       PLAN  [x]  Upgrade activities as tolerated     [x]  Continue plan of care  []  Update interventions per flow sheet       []  Discharge due to:_  []  Other:_      Leah Mcmillan PTA 9/3/2019  9:58 AM    Future Appointments   Date Time Provider Noy Christianson   9/3/2019 10:00 AM Donnell Amos, PT [facility]CP[facility]B [facility]   9/3/2019 10:30 AM Butler Holstein, PTA [facility]   9/5/2019 10:00 AM Devorah Bonilla, PT [facility]CPTP[facility]   9/5/2019 10:30 AM Butler Holstein, PTA [facility]CPTPB [facility]   9/10/2019 10:00 AM Inocente Haynes, PT MMCPTPB [facility]   9/10/2019 10:30 AM Twan Mccartney, Ric Manuel [facility]   2019 11:00 AM Venefredrick Chua, PT MMCPTPB [facility]   2019 11:30 AM Deisi Chua, PT MMCPTPB [facility]   10/14/2019  1:15 PM Ree Sherman  [address]

## 2019-09-04 NOTE — PROGRESS NOTES
[facility]  Valery Ignacio  [address] [facility]  [phone] fax    Continued Plan of Care/ Re-certification for Physical Therapy Services    Patient name: Razia Orozco Start of Care: 2019   Referral source: Myron Brito MD : 1950               Medical Diagnosis: Pain in right thigh [M79.651]  Radiculopathy, lumbar region [M54.16]  Other symptoms and signs involving the musculoskeletal system [R29.898]  Other abnormalities of gait and mobility [R26.89]  Payor: VA MEDICARE / Plan: VA MEDICARE PART A & B / Product Type: Medicare /  Onset Date: wrosening about 1 month               Treatment Diagnosis: LBP, radiating pain with Right LE and impaired balance   Prior Hospitalization: see medical history Provider#: [id number]   Medications: Verified on Patient summary List    Comorbidities: osteoporosis, back surgery (l/s fusion) in    Prior Level of Function: mod Ind with all mobility, several steps to enter house, no falling.     Visits from Start of Care: 23    Missed Visits: 1    The Plan of Care and following information is based on the patient's current status:  Goal:Pt will improve FOTO score by 8 points to show improvement with functional mobility   Status at last note/certification: increased 4 pts  Current Status: not met    Goal:Pt will report no more than 1-2/10 pain at worst to improve QOL.   Status at last note/certification:3-4/10  Current Status: not met    Goal:Pt will improve B LEs strength at least 4/5 to improve ease and safety with amb  Status at last note/certification: 3-/5 hip extension   Current Status: not met    Goal:Pt will be independent with HEP to maximize ease with amb/ADLs  Status at last note/certification: reviewed  Current Status: not met    Key functional changes:   Hip extension MMT: right 3/5  Left 3/5  Hip abduction MMT: right 3+/5 left 4-/5  Hip adduction MMT: right 3+/5 left 3+/5  Hip flexion MMT: right 4+/5 left 4+/5  Hip IR MMT: right 4-/5  left 4/5  Hip ER MMT: right 4-5 left 4/5    2 points improvement in FOTO since [facility]  4/10 average pain          Problems/ barriers to goal attainment: none     Problem List: pain affecting function, decrease ROM, decrease strength, decrease ADL/ functional abilitiies, decrease activity tolerance and decrease flexibility/ joint mobility    Treatment Plan: Therapeutic exercise, Therapeutic activities, Neuromuscular re-education, Physical agent/modality, Manual therapy and Patient education     Patient Goal (s) has been updated and includes:      Goals for this certification period to be accomplished in 2--3 weeks:  1. Pt will improve FOTO score by 8 points to show improvement with functional mobility. 2. Pt will report no more than 1-2/10 pain at worst to improve QOL.     3. Pt will improve B LEs strength at least 4/5 to improve ease and safety with amb.   4. Pt will be independent with updated HEP to maximize ease with amb/ADLs    Frequency / Duration: Patient to be seen 2 times per week for 2-3 weeks:    Assessment / Recommendations: Pt making slow progress towards goals with limited improvement and continues to experience averaging 4/10 pain in her back and hip. She is no longer experiencing radicular symptoms into the anterior right thigh, but continues to have difficulty with prolonged walking and vacuuming. Will attempt dry needling to assist with reducing hypertonicity of right glute/piriformis/l/s parapsinals along with exercises and then if no progress is made will refer back to MD at that time. Certification Period: 9/03/2019 to 10/02/2019    Aliyah Zamorano, PT 9/4/2019 6:49 PM    ________________________________________________________________________  I certify that the above Therapy Services are being furnished while the patient is under my care. I agree with the treatment plan and certify that this therapy is necessary.     [] I have read the above and request that my patient continue as recommended.   [] I have read the above report and request that my patient continue therapy with the following changes/special instructions: _______________________________________  [] I have read the above report and request that my patient be discharged from therapy    Physician's Signature:____________Date:_________TIME:________    ** Signature, Date and Time must be completed for valid certification **    Please sign and return to [facility]  [facility]  [address] [place] [address]  [phone] fax

## 2019-09-05 ENCOUNTER — HOSPITAL ENCOUNTER (OUTPATIENT)
Dept: PHYSICAL THERAPY | Age: 69
Discharge: HOME | End: 2019-09-05
Payer: MEDICARE

## 2019-09-05 ENCOUNTER — APPOINTMENT (OUTPATIENT)
Dept: PHYSICAL THERAPY | Age: 69
End: 2019-09-05
Payer: MEDICARE

## 2019-09-05 PROCEDURE — 97110 THERAPEUTIC EXERCISES: CPT

## 2019-09-05 PROCEDURE — 97140 MANUAL THERAPY 1/> REGIONS: CPT

## 2019-09-05 NOTE — PROGRESS NOTES
PT DAILY TREATMENT NOTE 10-18    Patient Name: aYriel St  Date:2019  : 1950  [x]  Patient  Verified  Payor: VA MEDICARE / Plan: VA MEDICARE PART A & B / Product Type: Medicare /    In time: 10:00  Out time: 10:30  Total Treatment Time (min): 30  Visit #: 10 of 16    Medicare/BCBS Only   Total Timed Codes (min):  30 1:1 Treatment Time:  30       Treatment Area: Neck pain [M54.2]    SUBJECTIVE  Pain Level (0-10 scale): 2/10  Any medication changes, allergies to medications, adverse drug reactions, diagnosis change, or new procedure performed?: [x] No    [] Yes (see summary sheet for update)  Subjective functional status/changes:   [] No changes reported  Pt. Reports she is feeling better overall but continues to have pain. OBJECTIVE    8 min Therapeutic Exercise:  [x] See flow sheet :   Rationale: increase ROM and increase strength to improve the patients ability to increase ease of ADLs    22 min Manual Therapy:  See note below, STM to B UT and infraspinatus    Rationale: decrease pain, increase ROM and increase tissue extensibility to increase ease of turning head. Dry Needling Procedure Note    Procedure: An intramuscular manual therapy (dry needling) and a neuro-muscular re-education treatment was done to deactivate myofascial trigger points with a 30 gauge filament needle under aseptic technique. Indications:  [x] Myofascial pain and dysfunction [] Muscled spasms  [x] Myalgia/myositis   [] Muscle cramps  [] Muscle imbalances  [] Temporomandibular Dysfunction  [] Other:    Chart reviewed for the following:  Anna OREILLY, PT, have reviewed the medical history, summary list and precautions/contraindications for [address]   TIME OUT performed immediately prior to start of procedure:  Anna OREILLY PT, have performed the following reviews on [address] prior to the start of the session:      [x] Verified patient identification by name and date of birth    [x] Agreement on all muscles being treated was verified   [x] Purpose of dry needling, side effects, possible complications, risks and benefits were explained to the patient   [x] Procedure site(s) verified  [x] Patient was positioned for comfort and draped for privacy  [x] Informed Consent was signed (initial visit) and verified verbally (subsequent visits)  [x] Patient was instructed on the need to report the use of blood thinners and/or immunosuppressant medications  [x] How to respond to possible adverse effects of treatment  [x] Self treatment of post needling soreness: ice, heat (moist heat, heat wraps) and stretching  [x] Opportunity was given to ask any questions, all questions were answered            Time: 10:05-10:20    Date of procedure: 9/5/2019  Treatment: The following muscles were treated today with intramuscular dry needling  [] Left [] Right Masster  [] Left [] Right Temporalis  [] Left [] Right Zygomaticus Major / Minor  [] Left [] Right Lateral Pterygoid  [] Left [] Right Medial Pterygoid  [] Left [] Right Digastric Post / Anterior Belly  [] Left [] Right Sternocleidomastoid  [] Left [] Right Scalene Anterior / Medial / Posterior  [] Left [] Right Extra Laryngeal Muscles  [x] Left [x] Right Upper Trapezius  [] Left [] Right Middle Trapezius  [] Left [] Right Lower Trapezius  [] Left [] Right Oblique Capitis Inferior  [] Left [] Right Splenius Capitis / Cervicis  [] Left [] Right Semispinalis: Capitis / Cervicis  [x] Left [x] Right Multifidi / Rotatores Cervicis / Thoracic  [] Left [] Right Longissimus Thoracis / Illiocostalis  [] Left [] Right Levator Scapulae  [x] Left [x] Right Supraspinatus / Infraspinatus  [] Left [] Right Teres Major / Minor  [] Left [] Right Rhomboids / Serratus posterior superior  [] Left [] Right Pectoralis Major / Minor  [] Left [] Right Serratus Anterior  [] Left [] Right Latissimus Dorsi  [] Left [] Right Subscapularis  [] Left [] Right Coracobrachialis  [] Left [] Right Biceps Brachii  [] Left [] Right Deltoid: Anterior / Medial / Posterior  [] Left [] Right Brachialis  [] Left [] Right Triceps  [] Left [] Right Brachioradialis  [] Left [] Right Extensor Carpi Radialis Brevis / Extensor Carpi Radialis Longus    [] Left [] Right  Extensor digitorum  [] Left [] Right Supinator / Pronator Teres  [] Left [] Right Flexor Carpi Radialis/ Flexor Carpi Ulnaris   [] Left [] Right  Flexor Digitorum Superficialis/ Flexor Digitorum Profundus  [] Left [] Right Flexor Pollicis Longus / Flexor Pollicis Brevis / Palmaris Longus  [] Left [] Right Abductor Pollicis Longus / Abductor Pollicis Brevis  [] Left [] Right Opponens Pollicis / Adductor Pollicis  [] Left [] Right Dorsal / Palmar Interossei / Lumbricalis  [] Left [] Right Abductor Digiti Minimi / Opponens Digiti Minimi    Patient's response to today's treatment:  [] Latent Twitch Response     [x] Muscle relaxation [] Pain Relief  [] Post needling soreness    [] without complications  [] Increased Range of Motion   [] Decreased headaches    [] Decreased Tinnitus  [] Other:     Performed by: Pao Gold, PT            With   [x] TE   [] TA   [] neuro   [] other: Patient Education: [x] Review HEP    [] Progressed/Changed HEP based on:   [] positioning   [] body mechanics   [] transfers   [] heat/ice application    [] other:      Other Objective/Functional Measures:   Pt. Tolerated PT well with no reports of increased pain  She has multiple trigger points in B UT and infraspinatus  She was educated on ice/heat and stretches to reduce post needling soreness     Pain Level (0-10 scale) post treatment:  2/10    ASSESSMENT/Changes in Function: pt. Is progressing slowly towards goals. She continues to have decreased cervical mobility but pain was decreased today. She continues to have multiple trigger points in posterior shoulder and B UT.      Patient will continue to benefit from skilled PT services to modify and progress therapeutic interventions, address functional mobility deficits, address ROM deficits, address strength deficits, analyze and address soft tissue restrictions, analyze and cue movement patterns, analyze and modify body mechanics/ergonomics and assess and modify postural abnormalities to attain remaining goals. Progress towards goals / Updated goals:  1. Pt will increase FOTO score by 12 pts to improve CS function. 2. Pt will report </= 3 headaches per week in order to improve QOL.      3. Pt will report CS pain <3/10 to ease with ADL's. Progressing; 2/10 (9/5/19)  4.  Pt will report >70% improvement is sx to ease with ADL's    PLAN  []  Upgrade activities as tolerated     [x]  Continue plan of care  []  Update interventions per flow sheet       []  Discharge due to:_  []  Other:_      Catalina Patton, PT 9/5/2019  10:04 AM    Future Appointments   Date Time Provider Noy Christianson   9/5/2019 10:30 AM Derrick Purcell, PTA [facility]   9/10/2019 10:00 AM Yumi Arreola, PT [facility]   9/10/2019 10:30 AM Laura Sumner, PT [facility]   9/12/2019 11:00 AM Robbie Baumgarten, PT [facility]   9/12/2019 11:30 AM Robbie Baumgarten, PT [facility]CP[facility]   10/14/2019  1:15 PM Vonzella Schlatter,  [address]

## 2019-09-05 NOTE — PROGRESS NOTES
PT DAILY TREATMENT NOTE 10-18    Patient Name: Brinda Pederson  Date:2019  : 1950  [x]  Patient  Verified  Payor: [place] MEDICARE / Plan: [place] MEDICARE PART A & B / Product Type: Medicare /    In time: 10:36  Out time:11:21  Total Treatment Time (min): 45  Visit #: 24 of 24    Medicare/BCBS Only   Total Timed Codes (min):  30 1:1 Treatment Time: 30       Treatment Area: Pain in right thigh [M79.651]  Radiculopathy, lumbar region [M54.16]  Other symptoms and signs involving the musculoskeletal system [R29.898]  Other abnormalities of gait and mobility [R26.89]    SUBJECTIVE  Pain Level (0-10 scale): 210  Any medication changes, allergies to medications, adverse drug reactions, diagnosis change, or new procedure performed?: [x] No    [] Yes (see summary sheet for update)  Subjective functional status/changes:   [] No changes reported  Pt reports today is a good day and that her neck is mainly bothering her. She is looking forward to trying the dry needling for her back/hip. She feels the most relief with heat. OBJECTIVE    15 minutes of MH in sitting to the l/s for decreased tightness and pain for ease of ambulation and performing chores    30 min Therapeutic Exercise:  [] See flow sheet :   Rationale: increase ROM and increase strength to improve the patients ability to increase ease of ADLs.           With   [] TE   [] TA   [] neuro   [] other: Patient Education: [x] Review HEP    [] Progressed/Changed HEP based on:   [] positioning   [] body mechanics   [] transfers   [] heat/ice application    [] other:      Other Objective/Functional Measures:   No increased pain with exercises  Challenged with hip abduction  Pt could still feel a light stretch with SB flexion  Worked on pallof press In supine to maintain neutral core with decreased pain  Held on planks due to neck pain    Pain Level (0-10 scale) post treatment: 2/10    ASSESSMENT/Changes in Function: Pt continues with fluctuating pain levels in the back and hips. She has decreased radicular symptoms. She struggles with chores like vacuuming due to pain. She has decreased ability to stretch l/s due to fusion and would benefit from trial of dry needling to address myofascial pain and trigger points prior to D/C from therapy. Goals for this certification period to be accomplished in 2--3 weeks:  1. Pt will improve FOTO score by 8 points to show improvement with functional mobility. 2. Pt will report no more than 1-2/10 pain at worst to improve QOL.   2/10 today (9/5/19)  3. Pt will improve B LEs strength at least 4/5 to improve ease and safety with amb.   4. Pt will be independent with updated HEP to maximize ease with amb/ADLs     PLAN  [x]  Upgrade activities as tolerated     [x]  Continue plan of care  []  Update interventions per flow sheet       []  Discharge due to:_  []  Other:_      Kash Lynch PTA 9/5/2019  9:58 AM    Future Appointments   Date Time Provider Noy Christianson   9/5/2019 10:30 AM Sonia Benitez, ROB [facility]   9/10/2019 10:00 AM Garland Gates, PT [facility]   9/10/2019 10:30 AM Ebony Smith, PT [facility]   9/12/2019 11:00 AM Gerber Worley PT [facility]   9/12/2019 11:30 AM Gerber Worley PT [facility]   10/14/2019  1:15 PM Marta Palmer  [address]

## 2019-09-10 ENCOUNTER — HOSPITAL ENCOUNTER (OUTPATIENT)
Dept: PHYSICAL THERAPY | Age: 69
Discharge: HOME | End: 2019-09-10
Payer: MEDICARE

## 2019-09-10 PROCEDURE — 97140 MANUAL THERAPY 1/> REGIONS: CPT

## 2019-09-10 PROCEDURE — 97110 THERAPEUTIC EXERCISES: CPT

## 2019-09-10 NOTE — PROGRESS NOTES
PT DAILY TREATMENT NOTE 10-18    Patient Name: Eric Gudino  Date:9/10/2019  : 1950  [x]  Patient  Verified  Payor: [place] MEDICARE / Plan: VA MEDICARE PART A & B / Product Type: Medicare /    In time:1036  Out time:1100  Total Treatment Time (min): 24  Visit #: 1 of 4-6    Medicare/BCBS Only   Total Timed Codes (min):  24 1:1 Treatment Time:  24       Treatment Area: Neck pain [M54.2]    SUBJECTIVE  Pain Level (0-10 scale): 3/10  Any medication changes, allergies to medications, adverse drug reactions, diagnosis change, or new procedure performed?: [x] No    [] Yes (see summary sheet for update)  Subjective functional status/changes:   [] No changes reported  Reporte she had to leave at 1100 today for an appt. Her right LS is sore to touch. OBJECTIVE      24 min Therapeutic Exercise:  [] See flow sheet :   Rationale: increase ROM and increase strength to improve the patients ability to ease with ADL's     With   [] TE   [] TA   [] neuro   [] other: Patient Education: [x] Review HEP    [] Progressed/Changed HEP based on:   [] positioning   [] body mechanics   [] transfers   [] heat/ice application    [] other:      Other Objective/Functional Measures: KT to inhibit paraspinals, to decrease pain, to improve with posture and LS function. Pt educated on tape function, removal. Etc. Pain Level (0-10 scale) post treatment: 3/10    ASSESSMENT/Changes in Function: Slow progress. Pt will initiate Dry needling to her LS on Friday. Patient will continue to benefit from skilled PT services to modify and progress therapeutic interventions, address functional mobility deficits, address ROM deficits, address strength deficits, analyze and address soft tissue restrictions, analyze and cue movement patterns, analyze and modify body mechanics/ergonomics and assess and modify postural abnormalities to attain remaining goals.      [x]  See Plan of Care  []  See progress note/recertification  []  See Discharge Summary         Progress towards goals / Updated goals:  1. Pt will improve FOTO score by 8 points to show improvement with functional mobility. 2. Pt will report no more than 1-2/10 pain at worst to improve QOL.   2/10 today (9/5/19)  3. Pt will improve B LEs strength at least 4/5 to improve ease and safety with amb.   4. Pt will be independent with updated HEP to maximize ease with amb/ADLs     PLAN  [x]  Upgrade activities as tolerated     [x]  Continue plan of care  []  Update interventions per flow sheet       []  Discharge due to:_  []  Other:_      Sabine Iverson PT 9/10/2019  10:47 AM    Future Appointments   Date Time Provider Noy Christianson   9/13/2019 11:00 AM Carloz Barksdale PT [facility]   9/13/2019 11:30 AM Carloz Barksdale PT [facility]   10/14/2019  1:15 PM Sommer Newman  [address]

## 2019-09-10 NOTE — PROGRESS NOTES
PT DAILY TREATMENT NOTE 10-18    Patient Name: Casimiro Ray  Date:9/10/2019  : 1950  [x]  Patient  Verified  Payor: VA MEDICARE / Plan: VA MEDICARE PART A & B / Product Type: Medicare /    In time:1000  Out time:1032  Total Treatment Time (min): 32  Visit #: 11 of 16    Medicare/BCBS Only   Total Timed Codes (min):  32 1:1 Treatment Time: 25        Treatment Area: Pain in right thigh [M79.651]  Radiculopathy, lumbar region [M54.16]  Other symptoms and signs involving the musculoskeletal system [R29.898]  Other abnormalities of gait and mobility [R26.89]    SUBJECTIVE  Pain Level (0-10 scale): 2/10  Any medication changes, allergies to medications, adverse drug reactions, diagnosis change, or new procedure performed?: [x] No    [] Yes (see summary sheet for update)  Subjective functional status/changes:   [] No changes reported  Patient reports dry needling has really helped her neck. OBJECTIVE:       24 min Therapeutic Exercise:  [x] See flow sheet :   Rationale: increase ROM and increase strength to improve the patients ability to increase ease of ADL's.    8 min Manual Therapy:  TPR UT/LS right >left,    Rationale: decrease pain, increase ROM and decrease trigger points to increase ease of c/s AROM. With   [] TE   [] TA   [] neuro   [] other: Patient Education: [x] Review HEP    [] Progressed/Changed HEP based on:   [] positioning   [] body mechanics   [] transfers   [] heat/ice application    [] other:      Other Objective/Functional Measures:   Cues provided to correct row form   Pt does not count repetitions   TTP right UT/LS> left       Pain Level (0-10 scale) post treatment: 210    ASSESSMENT/Changes in Function: Patient is slowly progressing with decreasing neck pain. She continues to present with UT/LS trigger points; pt ordered herself a thercane. Will continue to address flexibility and ROM in order to increase ease of c/s mobility for daily activities.      Patient will continue to benefit from skilled PT services to modify and progress therapeutic interventions, address ROM deficits, address strength deficits, analyze and address soft tissue restrictions and analyze and cue movement patterns to attain remaining goals. [x]  See Plan of Care  []  See progress note/recertification  []  See Discharge Summary         Progress towards goals / Updated goals:  1. Pt will increase FOTO score by 12 pts to improve CS function. 2. Pt will report </= 3 headaches per week in order to improve QOL.      3. Pt will report CS pain <3/10 to ease with ADL's. Progressing; 2/10 (9/5/19)  4.  Pt will report >70% improvement is sx to ease with ADL's    PLAN  [x]  Upgrade activities as tolerated     [x]  Continue plan of care  []  Update interventions per flow sheet       []  Discharge due to:_  []  Other:_      Terence Martinez PT 9/10/2019  11:10 AM    Future Appointments   Date Time Provider Noy Christianson   9/13/2019 11:00 AM Komal So, PT [facility]   9/13/2019 11:30 AM [facility]omal So[facility]   10/14/2019  1:15 PM Vaishali Stewart  [address]

## 2019-09-12 ENCOUNTER — APPOINTMENT (OUTPATIENT)
Dept: PHYSICAL THERAPY | Age: 69
End: 2019-09-12
Payer: MEDICARE

## 2019-09-13 ENCOUNTER — HOSPITAL ENCOUNTER (OUTPATIENT)
Dept: PHYSICAL THERAPY | Age: 69
Discharge: HOME | End: 2019-09-13
Payer: MEDICARE

## 2019-09-13 PROCEDURE — 97140 MANUAL THERAPY 1/> REGIONS: CPT

## 2019-09-13 PROCEDURE — 97110 THERAPEUTIC EXERCISES: CPT

## 2019-09-13 NOTE — PROGRESS NOTES
PT DAILY TREATMENT NOTE 10-18    Patient Name: Tsering Arana  Date:2019  : 1950  [x]  Patient  Verified  Payor: VA MEDICARE / Plan: VA MEDICARE PART A & B / Product Type: Medicare /    In time: 11:30  Out time: 12:25  Total Treatment Time (min): 55  Visit #: 12 of 16    Medicare/BCBS Only   Total Timed Codes (min):  40 1:1 Treatment Time:  35       Treatment Area: Neck pain [M54.2]    SUBJECTIVE  Pain Level (0-10 scale): 2/10  Any medication changes, allergies to medications, adverse drug reactions, diagnosis change, or new procedure performed?: [x] No    [] Yes (see summary sheet for update)  Subjective functional status/changes:   [] No changes reported  Pt. Reports she is feeling better overall but continues to have pain. OBJECTIVE    10 min Therapeutic Exercise:  [x] See flow sheet :   Rationale: increase ROM and increase strength to improve the patients ability to increase ease of ADLs    30 min Manual Therapy:  See note below, STM to B UT and infraspinatus    Rationale: decrease pain, increase ROM and increase tissue extensibility to increase ease of turning head. Dry Needling Procedure Note    Procedure: An intramuscular manual therapy (dry needling) and a neuro-muscular re-education treatment was done to deactivate myofascial trigger points with a 30 gauge filament needle under aseptic technique. Indications:  [x] Myofascial pain and dysfunction [] Muscled spasms  [x] Myalgia/myositis   [] Muscle cramps  [] Muscle imbalances  [] Temporomandibular Dysfunction  [] Other:    Chart reviewed for the following:  Ebonie OREILLY PT, have reviewed the medical history, summary list and precautions/contraindications for Tsering Arana.   TIME OUT performed immediately prior to start of procedure:  Ebonie OREILLY PT, have performed the following reviews on Tsering Arana prior to the start of the session:      [x] Verified patient identification by name and date of birth    [x] Agreement on all muscles being treated was verified   [x] Purpose of dry needling, side effects, possible complications, risks and benefits were explained to the patient   [x] Procedure site(s) verified  [x] Patient was positioned for comfort and draped for privacy  [x] Informed Consent was signed (initial visit) and verified verbally (subsequent visits)  [x] Patient was instructed on the need to report the use of blood thinners and/or immunosuppressant medications  [x] How to respond to possible adverse effects of treatment  [x] Self treatment of post needling soreness: ice, heat (moist heat, heat wraps) and stretching  [x] Opportunity was given to ask any questions, all questions were answered            Time: 11:30-11:45    Date of procedure: 9/13/2019  Treatment: The following muscles were treated today with intramuscular dry needling  [] Left [] Right Masster  [] Left [] Right Temporalis  [] Left [] Right Zygomaticus Major / Minor  [] Left [] Right Lateral Pterygoid  [] Left [] Right Medial Pterygoid  [] Left [] Right Digastric Post / Anterior Belly  [] Left [] Right Sternocleidomastoid  [] Left [] Right Scalene Anterior / Medial / Posterior  [] Left [] Right Extra Laryngeal Muscles  [x] Left [x] Right Upper Trapezius  [] Left [] Right Middle Trapezius  [] Left [] Right Lower Trapezius  [] Left [] Right Oblique Capitis Inferior  [] Left [] Right Splenius Capitis / Cervicis  [] Left [] Right Semispinalis: Capitis / Cervicis  [x] Left [x] Right Multifidi / Rotatores Cervicis / Thoracic  [] Left [] Right Longissimus Thoracis / Illiocostalis  [] Left [] Right Levator Scapulae  [x] Left [x] Right Supraspinatus / Infraspinatus  [] Left [] Right Teres Major / Minor  [] Left [] Right Rhomboids / Serratus posterior superior  [] Left [] Right Pectoralis Major / Minor  [] Left [] Right Serratus Anterior  [] Left [] Right Latissimus Dorsi  [] Left [] Right Subscapularis  [] Left [] Right Coracobrachialis  [] Left [] Right Biceps Brachii  [] Left [] Right Deltoid: Anterior / Medial / Posterior  [] Left [] Right Brachialis  [] Left [] Right Triceps  [] Left [] Right Brachioradialis  [] Left [] Right Extensor Carpi Radialis Brevis / Extensor Carpi Radialis Longus    [] Left [] Right  Extensor digitorum  [] Left [] Right Supinator / Pronator Teres  [] Left [] Right Flexor Carpi Radialis/ Flexor Carpi Ulnaris   [] Left [] Right  Flexor Digitorum Superficialis/ Flexor Digitorum Profundus  [] Left [] Right Flexor Pollicis Longus / Flexor Pollicis Brevis / Palmaris Longus  [] Left [] Right Abductor Pollicis Longus / Abductor Pollicis Brevis  [] Left [] Right Opponens Pollicis / Adductor Pollicis  [] Left [] Right Dorsal / Palmar Interossei / Lumbricalis  [] Left [] Right Abductor Digiti Minimi / Opponens Digiti Minimi    Patient's response to today's treatment:  [] Latent Twitch Response     [x] Muscle relaxation [] Pain Relief  [] Post needling soreness    [] without complications  [] Increased Range of Motion   [] Decreased headaches    [] Decreased Tinnitus  [] Other:     Performed by: Alford Bamberger, PT            With   [x] TE   [] TA   [] neuro   [] other: Patient Education: [x] Review HEP    [] Progressed/Changed HEP based on:   [] positioning   [] body mechanics   [] transfers   [] heat/ice application    [] other:      Other Objective/Functional Measures:   Pt. Tolerated PT well with no reports of increased pain  She has multiple trigger points in B UT and infraspinatus  Decreased tightness and trigger points following manual  She was educated on ice/heat and stretches to reduce post needling soreness     Pain Level (0-10 scale) post treatment:  3/10    ASSESSMENT/Changes in Function: pt. Is progressing slowly towards goals. She continues to have pain but does demonstrate improving cervical mobility. She continues to have frequent headaches as well.  She has been compliant with her HEP and self massage at home. Patient will continue to benefit from skilled PT services to modify and progress therapeutic interventions, address functional mobility deficits, address ROM deficits, address strength deficits, analyze and address soft tissue restrictions, analyze and cue movement patterns, analyze and modify body mechanics/ergonomics and assess and modify postural abnormalities to attain remaining goals. Progress towards goals / Updated goals:  1. Pt will increase FOTO score by 12 pts to improve CS function. 2. Pt will report </= 3 headaches per week in order to improve QOL.   Not met: pt. Continues to have headaches (9/13/19)    3. Pt will report CS pain <3/10 to ease with ADL's. Progressing; 2/10 (9/5/19)  4.  Pt will report >70% improvement is sx to ease with ADL's    PLAN  []  Upgrade activities as tolerated     [x]  Continue plan of care  []  Update interventions per flow sheet       []  Discharge due to:_  []  Other:_      Colon Peeling, PT 9/13/2019  10:04 AM    Future Appointments   Date Time Provider Noy Christianson   10/14/2019  1:15 PM Ree Sherman  [address]

## 2019-09-13 NOTE — PROGRESS NOTES
PT DAILY TREATMENT NOTE 10-18    Patient Name: Chase Bill  Date:2019  : 1950  [x]  Patient  Verified  Payor: VA MEDICARE / Plan: VA MEDICARE PART A & B / Product Type: Medicare /    In time: 11:00  Out time: 11:25  Total Treatment Time (min): 25  Visit #: 2 of 4-6    Medicare/BCBS Only   Total Timed Codes (min):  25 1:1 Treatment Time:  25       Treatment Area: Pain in right thigh [M79.651]  Radiculopathy, lumbar region [M54.16]  Other symptoms and signs involving the musculoskeletal system [R29.898]  Other abnormalities of gait and mobility [R26.89]    SUBJECTIVE  Pain Level (0-10 scale): 5/10  Any medication changes, allergies to medications, adverse drug reactions, diagnosis change, or new procedure performed?: [x] No    [] Yes (see summary sheet for update)  Subjective functional status/changes:   [] No changes reported  Pt. Reports she is having more pain from vacuuming earlier today. OBJECTIVE    8 min Therapeutic Exercise:  [x] See flow sheet :   Rationale: increase ROM to improve the patients ability to increase ease of ADLs    17 min Manual Therapy:  See note below STM to lumbar paraspinals and glut max   Rationale: decrease pain, increase ROM and increase tissue extensibility to increase ease of ambulation. Dry Needling Procedure Note    Procedure: An intramuscular manual therapy (dry needling) and a neuro-muscular re-education treatment was done to deactivate myofascial trigger points with a 30 gauge filament needle under aseptic technique. Indications:  [x] Myofascial pain and dysfunction [] Muscled spasms  [x] Myalgia/myositis   [] Muscle cramps  [] Muscle imbalances  [] Other:    Chart reviewed for the following:  Kenya OREILLY, PT, have reviewed the medical history, summary list and precautions/contraindications for Chase Bill.   TIME OUT performed immediately prior to start of procedure:  Kenya OREILLY, PT, have performed the following reviews on Tico Barrera prior to the start of the session:      [x] Verified patient identification by name and date of birth    [x] Agreement on all muscles being treated was verified   [x] Purpose of dry needling, side effects, possible complications, risks and benefits were explained to the patient   [x] Procedure site(s) verified  [x] Patient was positioned for comfort and draped for privacy  [x] Informed Consent was signed (initial visit) and verified verbally (subsequent visits)  [x] Patient was instructed on the need to report the use of blood thinners and/or immunosuppressant medications  [x] How to respond to possible adverse effects of treatment  [x] Self treatment of post needling soreness: ice, heat (moist heat, heat wraps) and stretching  [x] Opportunity was given to ask any questions, all questions were answered            Time:  11:05-11:20  Date of procedure: 9/13/2019    Treatment: The following muscles were treated today with intramuscular dry needling  [] Left [] Right Abdominals: Ant Rectus Abdominis  [] Left [] Right External Oblique / Internal Oblique / Transverse Abdominis  [] Left [] Right Thoracic Multifidi / Meriam Feil  [] Left [] Right Iliocostalis Derinda Andrew / Sanya Farrow  [x] Left [x] Right Longissimus Thoracis / Sanya Farrow  [] Left [] Right Lumbar Multifidi  [] Left [] Right Serratus Posterior Inferior  [] Left [] Right Quadratus Lumborum  [] Left [] Right Psoas  [] Left [] Right Iliacus  [] Left [] Right Iliopsoas (inguinal)  [] Left [] Right Piriformis  [] Left [] Right Quadratus Femoris  [x] Left [x] Right Karl Bi / Ariel Willams / Una Joe  [] Left [] Right Obturator Internus  [] Left [] Right Obturator Externus / Gramercy Stain / Inferior Gemellus    [] Left [] Right Tensor Fasciae Elidia  [] Left [] Right Iliotibial Band  [] Left [] Right Pectineus  [] Left [] Right Sartorius  [] Left [] Right Gracilis  [] Left [] Right Adductor Brevis / Adductor Longus / Adductor Ala Hair  [] Left [] Right Rectus Femoris / Vastus Lateralis / Vastus Intermedius / Vastus Medialis Obliquus  [] Left [] Right Tibialis Anterior / Posterior  [] Left [] Right Extensor Digitorum Longus / Brevis  [] Left [] Right Extensor Hallucis Longus / Brevis  [] Left [] Right Hamstrings: Biceps Femoris / Isaiah Sermon / Semimembranosis  [] Left [] Right Popliteus / Planteris  [] Left [] Right Gastrocnemius Medial / Lateral  [] Left [] Right Soleus  [] Left [] Right Peronei: Jerris Barnacle / Orvis Congress / Tertius  [] Left [] Right Flexor Digitorum Longus / Brevis  [] Left [] Right Flexor Hallucis Longus / Brevis  [] Left [] Right Quadratus Plantae  [] Left [] Right Abductor Digiti Minimi  [] Left [] Right Foot Interossei  [] Left [] Right Other:    Patient's response to today's treatment:  [] Latent Twitch Response  [x] Muscle relaxation [] Pain Relief  [] Post needling soreness [] without complications  [] Increased Range of Motion  [] Other:     Performed by: Flori Fam, PT          With   [x] TE   [] TA   [] neuro   [] other: Patient Education: [x] Review HEP    [] Progressed/Changed HEP based on:   [] positioning   [] body mechanics   [] transfers   [] heat/ice application    [] other:      Other Objective/Functional Measures:   Pt. Was educated on dry needling process  She has multiple trigger points along glut max and lumbar paraspinals more on right compared to left side  Decreased tightness and trigger points following manual  She was educated on stretches and heat to reduce post needling soreness       Pain Level (0-10 scale) post treatment: 5/10    ASSESSMENT/Changes in Function:  Pt. Is progressing slowly towards goals. She is having less pain overall but continues to have increased pain with household chores like vacuuming. She also continues to demonstrate decreased glut strength and core stability.      Patient will continue to benefit from skilled PT services to modify and progress therapeutic interventions, address functional mobility deficits, address ROM deficits, address strength deficits, analyze and address soft tissue restrictions, analyze and cue movement patterns, analyze and modify body mechanics/ergonomics and assess and modify postural abnormalities to attain remaining goals. Progress towards goals / Updated goals:  1. Pt will improve FOTO score by 8 points to show improvement with functional mobility. 2. Pt will report no more than 1-2/10 pain at worst to improve QOL.     Not met: 5/10 (9/13/19)  3. Pt will improve B LEs strength at least 4/5 to improve ease and safety with amb.   4. Pt will be independent with updated HEP to maximize ease with amb/ADLs     PLAN  []  Upgrade activities as tolerated     [x]  Continue plan of care  []  Update interventions per flow sheet       []  Discharge due to:_  []  Other:_      Evy Peters, PT 9/13/2019  12:43 PM    Future Appointments   Date Time Provider Noy Christianson   10/14/2019  1:15 PM Mechelle De La O  [address]

## 2019-09-17 ENCOUNTER — APPOINTMENT (OUTPATIENT)
Dept: PHYSICAL THERAPY | Age: 69
End: 2019-09-17
Payer: MEDICARE

## 2019-09-20 ENCOUNTER — HOSPITAL ENCOUNTER (OUTPATIENT)
Dept: PHYSICAL THERAPY | Age: 69
Discharge: HOME | End: 2019-09-20
Payer: MEDICARE

## 2019-09-20 PROCEDURE — 97110 THERAPEUTIC EXERCISES: CPT

## 2019-09-20 PROCEDURE — 97140 MANUAL THERAPY 1/> REGIONS: CPT

## 2019-09-20 NOTE — PROGRESS NOTES
PT DAILY TREATMENT NOTE 10-18    Patient Name: Savita Dye  Date:2019  : 1950  [x]  Patient  Verified  Payor: Freddy Obando / Plan: [place] MED[place]ARE PART A & B / Product Type: Medicare /    In time: 8:00  Out time: 8:34  Total Treatment Time (min): 34  Visit #: 3 of 4-6    Medicare/BCBS Only   Total Timed Codes (min):  34 1:1 Treatment Time:  34       Treatment Area: Pain in right thigh [M79.651]  Radiculopathy, lumbar region [M54.16]  Other symptoms and signs involving the musculoskeletal system [R29.898]  Other abnormalities of gait and mobility [R26.89]    SUBJECTIVE  Pain Level (0-10 scale): 4/10  Any medication changes, allergies to medications, adverse drug reactions, diagnosis change, or new procedure performed?: [x] No    [] Yes (see summary sheet for update)  Subjective functional status/changes:   [] No changes reported  Pt. Reports she is sore today from vacuuming and cleaning her bathtub. OBJECTIVE    8 min Therapeutic Exercise:  [x] See flow sheet :   Rationale: increase ROM and increase strength to improve the patients ability to increase ease of ADLs    26 min Manual Therapy:  See note below, STM lumbar paraspinals    Rationale: decrease pain, increase ROM and increase tissue extensibility to increase ease of ADLs    Dry Needling Procedure Note    Procedure: An intramuscular manual therapy (dry needling) and a neuro-muscular re-education treatment was done to deactivate myofascial trigger points with a 30 gauge filament needle under aseptic technique. Indications:  [x] Myofascial pain and dysfunction [] Muscled spasms  [x] Myalgia/myositis   [] Muscle cramps  [] Muscle imbalances  [] Other:    Chart reviewed for the following:  Estrada OREILLY PT, have reviewed the medical history, summary list and precautions/contraindications for Savita Dye.   TIME OUT performed immediately prior to start of procedure:  Estrada OREILLY PT, have performed the following reviews on [employer] prior to the start of the session:      [x] Verified patient identification by name and date of birth    [x] Agreement on all muscles being treated was verified   [x] Purpose of dry needling, side effects, possible complications, risks and benefits were explained to the patient   [x] Procedure site(s) verified  [x] Patient was positioned for comfort and draped for privacy  [x] Informed Consent was signed (initial visit) and verified verbally (subsequent visits)  [x] Patient was instructed on the need to report the use of blood thinners and/or immunosuppressant medications  [x] How to respond to possible adverse effects of treatment  [x] Self treatment of post needling soreness: ice, heat (moist heat, heat wraps) and stretching  [x] Opportunity was given to ask any questions, all questions were answered            Time: 8:05-8:20  Date of procedure: 9/20/2019    Treatment: The following muscles were treated today with intramuscular dry needling  [] Left [] Right Abdominals: Ant Rectus Abdominis  [] Left [] Right External Oblique / Internal Oblique / Transverse Abdominis  [] Left [] Right Thoracic Multifidi / Stacy Card  [] Left [] Right Iliocostalis [place] / Adrien Romance  [x] Left [x] Right Longissimus Thoracis / Adrien Romance  [] Left [] Right Lumbar Multifidi  [] Left [] Right Serratus Posterior Inferior  [] Left [] Right Quadratus Lumborum  [] Left [] Right Psoas  [] Left [] Right Iliacus  [] Left [] Right Iliopsoas (inguinal)  [x] Left [x] Right Piriformis  [] Left [] Right Quadratus Femoris  [x] Left [x] Right [place] / Bartolome Gall / Rosa Spillers  [] Left [] Right Obturator Internus  [] Left [] Right Obturator Externus / Irl Tricia / Inferior Gemellus    [] Left [] Right Tensor Fasciae Elidia  [] Left [] Right Iliotibial Band  [] Left [] Right Pectineus  [] Left [] Right Sartorius  [] Left [] Right Gracilis  [] Left [] Right Adductor Brevis / Adductor Longus / Adductor Juan Michael  [] Left [] Right Rectus Femoris / Vastus Lateralis / Vastus Intermedius / Vastus Medialis Obliquus  [] Left [] Right Tibialis Anterior / Posterior  [] Left [] Right Extensor Digitorum Longus / Brevis  [] Left [] Right Extensor Hallucis Longus / Brevis  [] Left [] Right Hamstrings: Biceps Femoris / Berle Heys / Semimembranosis  [] Left [] Right Popliteus / Planteris  [] Left [] Right Gastrocnemius Medial / Lateral  [] Left [] Right Soleus  [] Left [] Right Peronei: [facility] / Sherly Rummage / Tertius  [] Left [] Right Flexor Digitorum Longus / Brevis  [] Left [] Right Flexor Hallucis Longus / Brevis  [] Left [] Right Quadratus Plantae  [] Left [] Right Abductor Digiti Minimi  [] Left [] Right Foot Interossei  [] Left [] Right Other:    Patient's response to today's treatment:  [] Latent Twitch Response  [x] Muscle relaxation [] Pain Relief  [] Post needling soreness [] without complications  [] Increased Range of Motion  [] Other:     Performed by: Alford Bamberger, PT            With   [x] TE   [] TA   [] neuro   [] other: Patient Education: [x] Review HEP    [] Progressed/Changed HEP based on:   [] positioning   [] body mechanics   [] transfers   [] heat/ice application    [] other:      Other Objective/Functional Measures:   Pt. Tolerated PT well with no reports of increased pain  She has multiple trigger points along lumbar paraspinals and glutes  Decreased pain and tightness following manual  She was educated on ice/heat to reduce needling soreness         Pain Level (0-10 scale) post treatment:  2/10    ASSESSMENT/Changes in Function:  Pt. Is progressing slowly towards goals. She continues to have significant trigger points which are contributing to her pain. She also continues to demonstrate decreased hip strength and decreased core stability.      Patient will continue to benefit from skilled PT services to modify and progress therapeutic interventions, address functional mobility deficits, address ROM deficits, address strength deficits, analyze and address soft tissue restrictions and analyze and cue movement patterns to attain remaining goals. Progress towards goals / Updated goals:  1. Pt will improve FOTO score by 8 points to show improvement with functional mobility. 2. Pt will report no more than 1-2/10 pain at worst to improve QOL.     Not met: 4/10 (19)  3. Pt will improve B LEs strength at least 4/5 to improve ease and safety with amb.   4. Pt will be independent with updated HEP to maximize ease with amb/ADLs     PLAN  []  Upgrade activities as tolerated     [x]  Continue plan of care  []  Update interventions per flow sheet       []  Discharge due to:_  []  Other:_      Colon Peeling, PT 2019  7:56 AM    Future Appointments   Date Time Provider Noy Christianson   2019  8:00 AM Deisi Chua, PT [id number]   10/14/2019  1:15 PM Ree Sherman  [address]

## 2019-09-24 ENCOUNTER — HOSPITAL ENCOUNTER (OUTPATIENT)
Dept: PHYSICAL THERAPY | Age: 69
End: 2019-09-24
Payer: MEDICARE

## 2019-09-24 ENCOUNTER — HOSPITAL ENCOUNTER (OUTPATIENT)
Dept: PHYSICAL THERAPY | Age: 69
Discharge: HOME | End: 2019-09-24
Payer: MEDICARE

## 2019-09-24 PROCEDURE — 97110 THERAPEUTIC EXERCISES: CPT

## 2019-09-24 PROCEDURE — 97140 MANUAL THERAPY 1/> REGIONS: CPT

## 2019-09-24 PROCEDURE — 97164 PT RE-EVAL EST PLAN CARE: CPT

## 2019-09-24 NOTE — PROGRESS NOTES
PT DISCHARGE DAILY NOTE AND [id number]    Date:2019  Patient name: Moncho Montes De Oca Start of Care: 19   Referral source: Pastor Altamirano MD : 1950   Medical/Treatment Diagnosis: Neck pain [M54.2] Onset Date:7 weeks ago     Prior Hospitalization: see medical history Provider#: [id number]   Medications: Verified on Patient Summary List     Comorbidities: Depression,  osteoporosis, back surgery (l/s fusion) in 2015, DDD. Prior Level of Function: mod Ind with all mobility, several steps to enter house, no falling. Was previously working as a nurse. Visits from Start of Care: 13    Missed Visits: 0    Reporting Period : 19 to 19    [x]  Patient  Verified  Payor: Riri Oconnell / Plan: [place] MEDICARE PART A & B / Product Type: Medicare /    In time:8:03  Out time:8:33  Total Treatment Time (min): 30  Total Timed Codes (min): 30  1:1 Treatment Time (MC only): 30   Visit #: 13 of 16    SUBJECTIVE  Pain Level (0-10 scale): 1/10  Any medication changes, allergies to medications, adverse drug reactions, diagnosis change, or new procedure performed?: [x] No    [] Yes (see summary sheet for update)  Subjective functional status/changes:   [] No changes reported  Pt reports that she is leaving for vacation tonight. She wants today to be her last day of therapy for her neck. She wants to come back for one more session for her lumbar region. She really wants to have dry needling performed for her neck today. OBJECTIVE    10 min Manual Therapy:  Dry needling (see below), TPR B infraspinatus    Rationale: decrease pain, increase ROM, increase tissue extensibility and decrease trigger points to improve ease of head movements with ADLs. Dry Needling Procedure Note    Procedure:  An intramuscular manual therapy (dry needling) and a neuro-muscular re-education treatment was done to deactivate myofascial trigger points with a 30 gauge filament needle under aseptic technique. Indications:  [x] Myofascial pain and dysfunction [] Muscled spasms  [x] Myalgia/myositis   [] Muscle cramps  [] Muscle imbalances  [] Temporomandibular Dysfunction  [] Other:    Chart reviewed for the following:  Mary OREILLY PT, have reviewed the medical history, summary list and precautions/contraindications for [employer].   TIME OUT performed immediately prior to start of procedure:  Mary OREILLY PT, have performed the following reviews on [employer] prior to the start of the session:      [x] Verified patient identification by name and date of birth    [x] Agreement on all muscles being treated was verified   [x] Purpose of dry needling, side effects, possible complications, risks and benefits were explained to the patient   [x] Procedure site(s) verified  [x] Patient was positioned for comfort and draped for privacy  [x] Informed Consent was signed (initial visit) and verified verbally (subsequent visits)  [x] Patient was instructed on the need to report the use of blood thinners and/or immunosuppressant medications  [x] How to respond to possible adverse effects of treatment  [x] Self treatment of post needling soreness: ice, heat (moist heat, heat wraps) and stretching  [x] Opportunity was given to ask any questions, all questions were answered            Time: 8:10-8:15 (2 minutes needling time)   Date of procedure: 9/24/2019  Treatment: The following muscles were treated today with intramuscular dry needling  [] Left [] Right Masster  [] Left [] Right Temporalis  [] Left [] Right Zygomaticus Major / Minor  [] Left [] Right Lateral Pterygoid  [] Left [] Right Medial Pterygoid  [] Left [] Right Digastric Post / Anterior Belly  [] Left [] Right Sternocleidomastoid  [] Left [] Right Scalene Anterior / Medial / Posterior  [] Left [] Right Extra Laryngeal Muscles  [x] Left [] Right Upper Trapezius  [] Left [] Right Middle Trapezius  [] Left [] Right Lower Trapezius  [] Left [] Right Oblique Capitis Inferior  [] Left [] Right Splenius Capitis / Cervicis  [] Left [] Right Semispinalis: Capitis / Cervicis  [] Left [] Right Multifidi / Rotatores Cervicis / Thoracic  [] Left [] Right Longissimus Thoracis / Illiocostalis  [] Left [] Right Levator Scapulae  [] Left [] Right Supraspinatus / Infraspinatus  [] Left [] Right Teres Major / Minor  [] Left [] Right Rhomboids / Serratus posterior superior  [] Left [] Right Pectoralis Major / Minor  [] Left [] Right Serratus Anterior  [] Left [] Right Latissimus Dorsi  [] Left [] Right Subscapularis  [] Left [] Right Coracobrachialis  [] Left [] Right Biceps Brachii  [] Left [] Right Deltoid: Anterior / Medial / Posterior  [] Left [] Right Brachialis  [] Left [] Right Triceps  [] Left [] Right Brachioradialis  [] Left [] Right Extensor Carpi Radialis Brevis / Extensor Carpi Radialis Longus    [] Left [] Right  Extensor digitorum  [] Left [] Right Supinator / Pronator Teres  [] Left [] Right Flexor Carpi Radialis/ Flexor Carpi Ulnaris   [] Left [] Right  Flexor Digitorum Superficialis/ Flexor Digitorum Profundus  [] Left [] Right Flexor Pollicis Longus / Flexor Pollicis Brevis / Palmaris Longus  [] Left [] Right Abductor Pollicis Longus / Abductor Pollicis Brevis  [] Left [] Right Opponens Pollicis / Adductor Pollicis  [] Left [] Right Dorsal / Palmar Interossei / Lumbricalis  [] Left [] Right Abductor Digiti Minimi / Opponens Digiti Minimi    Patient's response to today's treatment:  [] Latent Twitch Response     [x] Muscle relaxation [] Pain Relief  [] Post needling soreness    [x] without complications  [] Increased Range of Motion   [] Decreased headaches    [] Decreased Tinnitus  [] Other:     Performed by: Rex Kemp PT          With   [] TE   [] TA   [] neuro   [] other: Patient Education: [x] Review HEP    [] Progressed/Changed HEP based on:   [] positioning   [] body mechanics   [] transfers   [] heat/ice application [] other:      Other Objective/Functional Measures:     No change in pain but pt reported increased sensation of relaxed muscle following dry needling    Gave and reviewed final HEP  Gave pt plum TB for theraband rows/extension with HEP  Correct form with final HEP interventions      Pain Level (0-10 scale) post treatment: 1/10    Summary of Care:  Goal: Pt will increase FOTO score by 12 pts to improve CS function. Status at last note/certification: Not met. Improved 2 points since initial evaluation, Improved 9 points since last assessed   Status at discharge: not met    Goal: Pt will report </= 3 headaches per week in order to improve QOL. Status at last note/certification: Goal met. 1 headache per week  Status at discharge: met    Goal: Pt will report CS pain <3/10 to ease with ADL's  Status at last note/certification: Goal met. Pt reports max pain 2/10 within the past headache  Status at discharge: met    Goal: Pt will report >70% improvement is sx to ease with ADL's  Status at last note/certification: Goal met. Pt reports 95% improvement since start of care  Status at discharge: met      ASSESSMENT/Changes in Function:     Pt has made steady progress in therapy, meeting 3/4 final goals and slowly progressing towards FOTO goal.  She reports significant reduction in pain/headaches and 95% overall improvement in sx since start of care. Pt is compliant with final HEP to address remaining strength/ROM deficits independently. DC at this time as skilled intervention is no longer required.      Thank you for this referral!     PLAN  [x]Discontinue therapy: [x]Patient has reached or is progressing toward set goals      []Patient is non-compliant or has abdicated      []Due to lack of appreciable progress towards set goals    César Garg, PT 9/24/2019  2:25 PM

## 2019-10-02 ENCOUNTER — HOSPITAL ENCOUNTER (OUTPATIENT)
Dept: PHYSICAL THERAPY | Age: 69
Discharge: HOME | End: 2019-10-02
Payer: MEDICARE

## 2019-10-02 PROCEDURE — 97110 THERAPEUTIC EXERCISES: CPT

## 2019-10-02 PROCEDURE — 97140 MANUAL THERAPY 1/> REGIONS: CPT

## 2019-10-02 NOTE — PROGRESS NOTES
Physical Therapy Discharge Instructions      [facility] [address]  [address] [facility]  [phone] fax    Patient: Polo Booth  : 1950      Continue Home Exercise Program 2 times per day for 6 weeks, then decrease to 3-5 times per week      Continue with    [x] Ice  as needed   [x] Heat         Follow up with MD:     [] Upon completion of therapy  [x] As needed    Recommendations:     [x]   Return to activity with home program    []   Return to activity with the following modifications:       []Post Rehab Program    []Join Independent aquatic program     []Return to/join local gym    Additional Comments: Keep up the great work at home!     Annalee Pineda, PT 10/2/2019 12:48 PM

## 2019-10-02 NOTE — PROGRESS NOTES
[facility] [address]  [address]  [phone] fax    Physical Therapy Discharge Summary    Patient name: Razia Umana Start of Care: 2019   Referral source: Rigoberto Brito MD : 1950               Medical Diagnosis: Pain in right thigh [M79.651]  Radiculopathy, lumbar region [M54.16]  Other symptoms and signs involving the musculoskeletal system [R29.898]  Other abnormalities of gait and mobility [R26.89]  Payor: [place] MEDICARE / Plan: VA MEDICARE PART A & B / Product Type: Medicare /  Onset Date: wrosening about 1 month               Treatment Diagnosis: LBP, radiating pain with Right LE and impaired balance   Prior Hospitalization: see medical history Provider#: [id number]   Medications: Verified on Patient summary List    Comorbidities: osteoporosis, back surgery (l/s fusion) in    Prior Level of Function: mod Ind with all mobility, several steps to enter house, no falling. Visits from Start of Care: 29    Missed Visits: 1    Reporting Period : 19 to  10/2/19    Summary of Care:  Goal: Pt will improve FOTO score by 8 points to show improvement with functional mobility. Status at last note/certification: 42  Status at discharge: met    Goal: Pt will report no more than 1-2/10 pain at worst to improve QOL.     Status at last note/certification:   Status at discharge: not met    Goal: Pt will improve B LEs strength at least 4/5 to improve ease and safety with amb.   Status at last note/certification: B hip ext: 3/5 hip abd: right: 3+/5 left: 4-/5  Status at discharge: not met    Goal: Pt will be independent with updated HEP to maximize ease with amb/ADLs  Status at last note/certification: n/a  Status at discharge: met    Pt. Has progressed well with physical therapy. She is having less pain overall and FOTO score improved to 47 points indicating a significant improvement in function.  She does continues to have decreased B hip strength ext: 4-/5 abd: 4-/5. She also continues to have trigger points and tightness along lumbar paraspinals and superior glutes. She was educated on continuing with her HEP following D/C.        ASSESSMENT/RECOMMENDATIONS:  [x]Discontinue therapy: [x]Patient has reached or is progressing toward set goals      []Patient is non-compliant or has abdicated      []Due to lack of appreciable progress towards set goals    Dangelo Hoffmann, PT 10/2/2019 12:50 PM

## 2019-10-02 NOTE — PROGRESS NOTES
PT DAILY TREATMENT NOTE 10-18    Patient Name: Aron Alcantara  Date:10/2/2019  : 1950  [x]  Patient  Verified  Payor: VA MEDICARE / Plan: VA MEDICARE PART A & B / Product Type: Medicare /    In time: 10:05  Out time: 10:51  Total Treatment Time (min): 46    Medicare/BCBS Only   Total Timed Codes (min):  36 1:1 Treatment Time:  30       Treatment Area: Neck pain [M54.2]    SUBJECTIVE  Pain Level (0-10 scale): 210  Any medication changes, allergies to medications, adverse drug reactions, diagnosis change, or new procedure performed?: [x] No    [] Yes (see summary sheet for update)  Subjective functional status/changes:   [] No changes reported  Pt. Reports her trip to [facility] went well. She reports she was able to do a lot of walking and stairs.      OBJECTIVE    Modality rationale: decrease pain and increase tissue extensibility to improve the patients ability to increase ease of ADLs   Min Type Additional Details    [] Estim:  []Unatt       []IFC  []Premod                        []Other:  []w/ice   []w/heat  Position:  Location:    [] Estim: []Att    []TENS instruct  []NMES                    []Other:  []w/US   []w/ice   []w/heat  Position:  Location:    []  Traction: [] Cervical       []Lumbar                       [] Prone          []Supine                       []Intermittent   []Continuous Lbs:  [] before manual  [] after manual    []  Ultrasound: []Continuous   [] Pulsed                           []1MHz   []3MHz W/cm2:  Location:    []  Iontophoresis with dexamethasone         Location: [] Take home patch   [] In clinic   10 []  Ice     [x]  heat  []  Ice massage  []  Laser   []  Anodyne Position: seated  Location: low back    []  Laser with stim  []  Other:  Position:  Location:    []  Vasopneumatic Device Pressure:       [] lo [] med [] hi   Temperature: [] lo [] med [] hi   [x] Skin assessment post-treatment:  [x]intact []redness- no adverse reaction    []redness  adverse reaction: 16 min Therapeutic Exercise:  [x] See flow sheet : manual muscle testing and patient education    Rationale: increase ROM and increase strength to improve the patients ability to increase ease of ADLs    20 min Manual Therapy:  See note below, STM to lumbar paraspinals    Rationale: decrease pain, increase ROM and increase tissue extensibility to increase ease of ADLs          With   [] TE   [] TA   [] neuro   [] other: Patient Education: [x] Review HEP    [] Progressed/Changed HEP based on:   [] positioning   [] body mechanics   [] transfers   [] heat/ice application    [] other:      Other Objective/Functional Measures: FOTO: 52  Hip MMT abduction right: 4-/5 left: 4-/5  ext: right: 4-/5 left: 4-/5  Pt. Was educated on continuing with her HEP following D/C    Pain Level (0-10 scale) post treatment: 2/10    ASSESSMENT/Changes in Function:      []  See Plan of Care  []  See progress note/recertification  [x]  See Discharge Summary         Progress towards goals / Updated goals:  See D/C note    PLAN  []  Upgrade activities as tolerated     []  Continue plan of care  []  Update interventions per flow sheet       [x]  Discharge due to:_ progress towards goals.    []  Other:_      Stepan Ryan, PT 10/2/2019  10:03 AM    Future Appointments   Date Time Provider Noy Christianson   10/14/2019  1:15 PM Cinthia Sandhu  [address]

## 2019-10-14 ENCOUNTER — OFFICE VISIT (OUTPATIENT)
Dept: ORTHOPEDIC SURGERY | Age: 69
End: 2019-10-14

## 2019-10-14 VITALS
RESPIRATION RATE: 18 BRPM | TEMPERATURE: 98.9 F | DIASTOLIC BLOOD PRESSURE: 83 MMHG | HEIGHT: 67 IN | HEART RATE: 100 BPM | BODY MASS INDEX: 26.63 KG/M2 | SYSTOLIC BLOOD PRESSURE: 104 MMHG

## 2019-10-14 DIAGNOSIS — M79.18 MYOFASCIAL PAIN: ICD-10-CM

## 2019-10-14 DIAGNOSIS — M79.604 RIGHT LEG PAIN: ICD-10-CM

## 2019-10-14 DIAGNOSIS — M79.18 CERVICAL MYOFASCIAL PAIN SYNDROME: ICD-10-CM

## 2019-10-14 DIAGNOSIS — M25.551 RIGHT HIP PAIN: ICD-10-CM

## 2019-10-14 DIAGNOSIS — M54.50 LUMBAR SPINE PAIN: ICD-10-CM

## 2019-10-14 DIAGNOSIS — Z98.1 S/P LUMBAR FUSION: ICD-10-CM

## 2019-10-14 DIAGNOSIS — M53.3 SACROILIAC PAIN: Primary | ICD-10-CM

## 2019-10-14 DIAGNOSIS — M54.59 MECHANICAL LOW BACK PAIN: ICD-10-CM

## 2019-10-14 DIAGNOSIS — M54.2 NECK PAIN: ICD-10-CM

## 2019-10-14 RX ORDER — PREGABALIN 150 MG/1
150 CAPSULE ORAL 2 TIMES DAILY
Qty: 60 CAP | Refills: 2 | Status: SHIPPED | OUTPATIENT
Start: 2019-10-14

## 2019-10-14 NOTE — PATIENT INSTRUCTIONS
Iliotibial Band Syndrome: Care Instructions  Your Care Instructions  Iliotibial band syndrome is pain and swelling of the iliotibial band (also called the IT band). This is a band of tissue that runs down the outside of your thigh. It connects the side of your hip to the side of your knee. It helps keep your knee and hip stable and in their normal position. When you have IT band syndrome, you may feel pain on the outside of your hip. It happens as your IT band snaps back and forth over the bony point of your hip. Sometimes you may only feel pain on the outside of your knee. You can get this syndrome if the IT band is too tight or if you do certain activities over and over that put pressure on your hip or knee. This is a common problem in runners, cyclists, and people who do other aerobic activities. IT band syndrome is treated with rest and medicines. These relieve swelling and pain. Physical therapy is also used. It may include stretching or doing certain exercises that can help strengthen your IT band and hip muscles. Sometimes a steroid shot is given to help relieve pain at the spot that is most sore. Follow-up care is a key part of your treatment and safety. Be sure to make and go to all appointments, and call your doctor if you are having problems. It's also a good idea to know your test results and keep a list of the medicines you take. How can you care for yourself at home? · Stay at a healthy weight. Being overweight puts extra strain on your hip and knee joints. · Take pain medicines exactly as directed. ? If the doctor gave you a prescription medicine for pain, take it as prescribed. ? If you are not taking a prescription pain medicine, ask your doctor if you can take an over-the-counter medicine. · Talk to your doctor or physical therapist about exercises that will help ease hip and knee pain. ? Stretch before you exercise. This can help prevent stiffness and injury.  You can try gentle forms of yoga to help keep your joints and muscles flexible. ? Use exercises that are less stressful on the joints. Walk instead of jog. Ride a stationary bike with little resistance. Or you can swim or try water exercise. ? Do exercises that can help strengthen your IT band and hip muscles. Your doctor or physical therapist can tell you what kind of exercises are best for you. He or she can help you learn the right way to do the exercises. When should you call for help? Watch closely for changes in your health, and be sure to contact your doctor if:    · You have pain in your hip or knee that doesn't go away.     · You do not get better as expected. Where can you learn more? Go to http://jag-michelle.info/. Enter L449 in the search box to learn more about \"Iliotibial Band Syndrome: Care Instructions. \"  Current as of: June 26, 2019  Content Version: 12.2  © 0195-0748 [employer]. Care instructions adapted under license by [employer] (which disclaims liability or warranty for this information). If you have questions about a medical condition or this instruction, always ask your healthcare professional. [place] [address] any warranty or liability for your use of this information. Iliotibial Band Syndrome: Exercises  Introduction  Here are some examples of exercises for you to try. The exercises may be suggested for a condition or for rehabilitation. Start each exercise slowly. Ease off the exercises if you start to have pain. You will be told when to start these exercises and which ones will work best for you. How to do the exercises  Iliotibial band stretch    1. Lean sideways against a wall. If you are not steady on your feet, hold on to a chair or counter. 2. Stand on the leg with the affected hip, with that leg close to the wall. Then cross your other leg in front of it.   3. Let your affected hip drop out to the side of your body and against the wall. Then lean away from your affected hip until you feel a stretch. 4. Hold the stretch for 15 to 30 seconds. 5. Repeat 2 to 4 times. Piriformis stretch    1. Lie on your back with your legs straight. 2. Lift your affected leg and bend your knee. With your opposite hand, reach across your body, and then gently pull your knee toward your opposite shoulder. 3. Hold the stretch for 15 to 30 seconds. 4. Repeat 2 to 4 times. Hamstring wall stretch    1. Lie on your back in a doorway, with your good leg through the open door. 2. Slide your affected leg up the wall to straighten your knee. You should feel a gentle stretch down the back of your leg. 1. Do not arch your back. 2. Do not bend either knee. 3. Keep one heel touching the floor and the other heel touching the wall. Do not point your toes. 3. Hold the stretch for at least 1 minute to begin. Then try to lengthen the time you hold the stretch to as long as 6 minutes. 4. Repeat 2 to 4 times. 5. If you do not have a place to do this exercise in a doorway, there is another way to do it:  6. Lie on your back, and bend the knee of your affected leg. 7. Loop a towel under the ball and toes of that foot, and hold the ends of the towel in your hands. 8. Straighten your knee, and slowly pull back on the towel. You should feel a gentle stretch down the back of your leg. 9. Hold the stretch for 15 to 30 seconds. Or even better, hold the stretch for 1 minute if you can. 10. Repeat 2 to 4 times. Follow-up care is a key part of your treatment and safety. Be sure to make and go to all appointments, and call your doctor if you are having problems. It's also a good idea to know your test results and keep a list of the medicines you take. Where can you learn more? Go to http://jag-michelle.info/. Enter P252 in the search box to learn more about \"Iliotibial Band Syndrome: Exercises. \"  Current as of: June 26, 2019  Content Version: 12.2  © 7086-2083 [employer]. Care instructions adapted under license by [employer] (which disclaims liability or warranty for this information). If you have questions about a medical condition or this instruction, always ask your healthcare professional. [address] any warranty or liability for your use of this information.

## 2019-10-14 NOTE — PROGRESS NOTES
[address]  [address], [address]  [place], [address] [place] [address] Street  Phone: [phone]  Fax: [phone]        Kole Chaudhry  : 1950  PCP: Kyle Perkins MD  10/14/2019    PROGRESS NOTE      HISTORY OF PRESENT ILLNESS  Jaleel Recinos is a 77 y.o. female who was seen as a new patient 19 with c/o  neck, low back, right buttock, and right thigh pain with frequent falls. Pt notes that her RLE feels \"like a wet noodle,\" and she will fall without warning. She has seen Dr. Maite Pappas for c/o left knee pain and low back pain, and he referred her to PT (19-current; Darra Needle). She had a lumbar fusion L2-S1 in 2015 after she slipped on ice while walking into work in 2015. She is also s/p right SI joint fixation/fusion. She also has a dx of osteoporosis and h/o parathyroidectomy.  She reports a recent fall that she fell when she went to answer her door and suffered abrasions to her right arm and she hit her head. She notes that she has noted headaches, photophobia, and difficulty sleeping since this fall. Lumbar MRI 3/26/19: L2-S1: postoperative findings. L1-2: Mild diffuse disc bulging with 2 mm retrolisthesis. She sees Dr. Coral May pain management, and he provided a right trochanteric injection yesterday (19). She is an RN (previously a school nurse). She continunes to attend PT with dry needling and acupressure (19-19; Darra Needle) with benefit. She notes that she discussed the right SI joint RFA with Dr. Hettie Cabot, but he recommended an injection first instead. She notes that she continues to have headaches. She notes that she saw Dr. Claudean Caraway (neurologist) for her post concussive syndrome and headaches, and he stated \"you do not want neck surgery. \"     Jaleel Recinos comes in to the office today for f/u.  She has seen significant improvement of her neck pain and headaches with PT and dry needling (8/27/19-10/2/19; Laura Marmolejo). She also found some improvement of her low back pain as well, however, she continues to have some residual pain. Pt is concerned about the cause of her falls \"when it hits, [right leg] feels like spaghetti, and it just goes down. 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 Her PCP weaned her Gabapentin down, so she is interested in trying Lyrica. She rates her pain as a 3/10 today. ASSESSMENT  Her pain appears due to sacroiliac pain on the right and pelvic pains compensatory for a potential right hip pathology. There is also likely a component of lumbar myofascial pain. Her cervical myofascial pain has improved. PLAN  1. Referral to Dr. Laurita Hudson for evaluation of proximal right thigh pain - potentially IT band, mechanical/myofascial pain, right hip pain. 2.  Advised she f/u with Dr. Yoni Jackson for evaluate a right hip pathology as she had R thigh pain reproduced with internal rotation of the R hip and tenderness to palpation of proximal RLE. 3. Lyrica 150 mg BID. Pt will f/u in 6 weeks or sooner as needed. Diagnoses and all orders for this visit:    1. Sacroiliac pain  -     REFERRAL TO SPORTS MEDICINE    2. Mechanical low back pain  -     REFERRAL TO SPORTS MEDICINE    3. Myofascial pain  -     REFERRAL TO SPORTS MEDICINE    4. Cervical myofascial pain syndrome    5. Right hip pain  -     REFERRAL TO SPORTS MEDICINE    6. Neck pain    7. S/P lumbar fusion  -     REFERRAL TO SPORTS MEDICINE  -     pregabalin (LYRICA) 150 mg capsule; Take 1 Cap by mouth two (2) times a day. Max Daily Amount: 300 mg.    8. Lumbar spine pain  -     REFERRAL TO SPORTS MEDICINE    9. Right leg pain  -     REFERRAL TO SPORTS MEDICINE  -     pregabalin (LYRICA) 150 mg capsule; Take 1 Cap by mouth two (2) times a day. Max Daily Amount: 300 mg.        PAST MEDICAL HISTORY   Past Medical History:   Diagnosis Date    Cancer [facility])     SKIN    Neuropathy     Psychiatric disorder     DEPRESSION    Sacroiliitis ([facility] [address]        Past Surgical History:   Procedure Laterality Date    ABDOMEN SURGERY PROC UNLISTED      GASTROPLASTY    BREAST SURGERY PROCEDURE UNLISTED      MASTOPLASTY    HX ABDOMINOPLASTY      HX BACK SURGERY      HX CHOLECYSTECTOMY      HX HEENT      BLEPHAROPLASTY    HX HEENT      FOREHEAD LIFT    HX HYSTERECTOMY      HX KNEE ARTHROSCOPY      HX OTHER SURGICAL      EXC OF MELANOMA    HX TONSILLECTOMY     . MEDICATIONS    Current Outpatient Medications   Medication Sig Dispense Refill    DULoxetine (CYMBALTA) 60 mg capsule Take 60 mg by mouth.  tiZANidine (ZANAFLEX) 2 mg tablet Take 4 mg by mouth.  HYDROcodone-acetaminophen (NORCO)  mg tablet Take 1 Tab by mouth.  famotidine (PEPCID) 20 mg tablet Take 1 Tab by mouth two (2) times a day. (Patient not taking: Reported on 7/9/2019) 30 Tab 0    Lactobacillus Acidoph & Bulgar (FLORANEX) 1 million cell tab tablet Take 2 Tabs by mouth two (2) times a day. 30 Tab 0    amLODIPine (NORVASC) 2.5 mg tablet Take 2 Tabs by mouth daily. (Patient not taking: Reported on 7/9/2019) 30 Tab 0    calcium-cholecalciferol, d3, (CALCIUM 600 + D) 600-125 mg-unit tab Take 1 Tab by mouth daily.  PYRIDOXINE HCL (VITAMIN B-6 PO) Take 1 Tab by mouth daily.  gabapentin (NEURONTIN) 300 mg capsule TAKE 1 CAPSULE BY MOUTH EVERY MORNING, 2 CAPSULES EVERY AFTERNOON, AND 3 CAPSULES AT NIGHT  4    citalopram (CELEXA) 40 mg tablet Take 40 mg by mouth daily.  melatonin tab tablet Take  by mouth nightly.  alendronate (FOSAMAX) 70 mg tablet Take 70 mg by mouth every seven (7) days.           ALLERGIES  No Known Allergies       SOCIAL HISTORY    Social History     Socioeconomic History    Marital status:      Spouse name: Not on file    Number of children: Not on file    Years of education: Not on file    Highest education level: Not on file   Tobacco Use    Smoking status: Never Smoker    Smokeless tobacco: Never Used   Substance and Sexual Activity    Alcohol use: No    Drug use: No       FAMILY HISTORY  Family History   Problem Relation Age of Onset    Diabetes Mother     Heart Disease Mother          REVIEW OF SYSTEMS  Review of Systems   Musculoskeletal: Positive for back pain, falls and neck pain. Right thigh pain    Neurological: Positive for headaches. PHYSICAL EXAMINATION  Visit Vitals  /83   Pulse 100   Temp 98.9 °F (37.2 °C) (Oral)   Resp 18   Ht 5' 7\" (1.702 m)   BMI 26.63 kg/m²       Pain Assessment  10/14/2019   Location of Pain Back   Location Modifiers -   Severity of Pain 3   Quality of Pain Aching   Duration of Pain Persistent   Frequency of Pain Constant   Aggravating Factors -   Limiting Behavior -   Relieving Factors -   Result of Injury -   Work-Related Injury -   Type of Injury -           Constitutional:  Well developed, well nourished, in no acute distress. Psychiatric: Affect and mood are appropriate. Integumentary: No rashes or abrasions noted on exposed areas. SPINE/MUSCULOSKELETAL EXAM    Cervical spine:  Neck is midline. Normal muscle tone. No focal atrophy is noted. ROM pain free. Shoulder ROM intact.   Tenderness to palpation of cervical paraspinals.   Negative Spurling's sign. Negative Tinel's sign. Negative Valverde's sign.      Sensation in the bilateral arms grossly intact to light touch.      Lumbar spine:  No rash, ecchymosis, or gross obliquity. No fasciculations. No focal atrophy is noted. No pain with hip ROM. Full range of motion. Tenderness to palpation. No tenderness to palpation at the sciatic notch. SI joints tender bilaterally (R>L). Trochanters non tender. Piriformis tender on the R.     Sensation in the bilateral legs grossly intact to light touch.     R thigh pain reproduced with internal rotation of the R hip.   Negative Babinski.     Updates 10/14/19:  Tenderness to palpation of proximal RLE circumferentially, particularly laterally  R thigh pain reproduced with internal rotation of the R hip. MOTOR:      Biceps  Triceps Deltoids Wrist Ext Wrist Flex Hand Intrin   Right 5/5 5/5 5/5 5/5 5/5 5/5   Left 5/5 5/5 5/5 5/5 5/5 5/5             Hip Flex  Quads Hamstrings Ankle DF EHL Ankle PF   Right 5/5 5/5 5/5 5/5 5/5 5/5   Left 5/5 5/5 5/5 5/5 4+/5 5/5     DTRs are 2+ biceps, triceps, brachioradialis, patella, and Achilles.     Negative Straight Leg raise. Squat not tested. No difficulty with tandem gait.      Ambulation without assistive device. FWB.       RADIOGRAPHS  Cervical XR images taken on 7/9/19 personally reviewed with patient:  Disc space narrowing C5-6, C6-7  Straightening of the cervical lordosis  No obvious compression fractures or instabilities     Lumbar MRI images taken on 3/26/19 personally reviewed with patient:  General observations: Postoperative findings. Metallic artifact related to posterior pedicle screw fixation and connecting rods L2-S1. Fluid collection at the laminectomy sites measuring approximately 9 mm from superior to inferior, 1.5 cm from anterior to posterior, and 3 cm from medial to lateral.  Alignment: L2-3: 3 mm anterior subluxation versus posterior angulation of the posterior-superior margin of L3. Vertebral bodies: L3 anterior wedge deformity, chronic. Approximately 50% loss of height anteriorly. L1 subtle anterior wedge deformity, considered within normal limits at this level. No bone marrow edema is evident to indicate recent compression fracture. L5-S1: Normal disc height. Mild disc desiccation. Minimal disc bulging. Wide posterior decompression. L4-5: Moderately severe disc narrowing. Mild disc desiccation. Wide posterior decompression. Minimal disc bulging. L3-4: Normal disc height and hydration. Mild diffuse annular bulging. Wide posterior decompression. L2-3: Normal disc height and hydration. Mild-moderate diffuse disc bulging.  Wide posterior decompression. L1-2: Normal disc height and hydration. Mild diffuse disc bulging. 2 mm retrolisthesis. Conus termination: L1-2. Lower thoracic spine: Within normal limits. Upper sacrum: right sacroiliac joint fixation device. Comparison: little interval change.     IMPRESSION:  L2-S1: postoperative findings. L1-2: Mild diffuse disc bulging with 2 mm retrolisthesis. Little interval change compared to the most recent previous MRI study performed here March 24., 2016.    22 minutes of face-to-face contact were spent with the patient during today's visit extensively discussing symptoms and treatment plan. All questions were answered. More than half of this visit today was spent on counseling.      Written by Alysha Gamboa as dictated by Moe Reece MD

## 2020-06-21 ENCOUNTER — HOSPITAL ENCOUNTER (EMERGENCY)
Dept: CT IMAGING | Age: 70
Discharge: HOME | End: 2020-06-21
Attending: PHYSICIAN ASSISTANT
Payer: MEDICARE

## 2020-06-21 ENCOUNTER — APPOINTMENT (OUTPATIENT)
Dept: GENERAL RADIOLOGY | Age: 70
End: 2020-06-21
Attending: EMERGENCY MEDICINE
Payer: MEDICARE

## 2020-06-21 ENCOUNTER — HOSPITAL ENCOUNTER (EMERGENCY)
Age: 70
Discharge: ANOTHER HOSPITAL | End: 2020-06-21
Attending: EMERGENCY MEDICINE
Payer: MEDICARE

## 2020-06-21 VITALS
BODY MASS INDEX: 28.93 KG/M2 | OXYGEN SATURATION: 100 % | WEIGHT: 180 LBS | HEIGHT: 66 IN | RESPIRATION RATE: 15 BRPM | TEMPERATURE: 98.5 F | HEART RATE: 87 BPM | SYSTOLIC BLOOD PRESSURE: 163 MMHG | DIASTOLIC BLOOD PRESSURE: 79 MMHG

## 2020-06-21 DIAGNOSIS — S09.90XA TRAUMATIC INJURY OF HEAD, INITIAL ENCOUNTER: ICD-10-CM

## 2020-06-21 DIAGNOSIS — I62.9 INTRACRANIAL BLEED (HCC): Primary | ICD-10-CM
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
## 2020-06-21 DIAGNOSIS — S22.31XA CLOSED FRACTURE OF ONE RIB OF RIGHT SIDE, INITIAL ENCOUNTER: ICD-10-CM

## 2020-06-21 LAB
ALBUMIN SERPL-MCNC: 4.1 G/DL (ref 3.4–5)
ALBUMIN/GLOB SERPL: 1 {RATIO} (ref 0.8–1.7)
ALP SERPL-CCNC: 113 U/L (ref 45–117)
ALT SERPL-CCNC: 20 U/L (ref 13–56)
ANION GAP SERPL CALC-SCNC: 6 MMOL/L (ref 3–18)
AST SERPL-CCNC: 17 U/L (ref 10–38)
BASOPHILS # BLD: 0 K/UL (ref 0–0.1)
BASOPHILS NFR BLD: 0 % (ref 0–2)
BILIRUB SERPL-MCNC: 0.4 MG/DL (ref 0.2–1)
BUN SERPL-MCNC: 20 MG/DL (ref 7–18)
BUN/CREAT SERPL: 22 (ref 12–20)
CALCIUM SERPL-MCNC: 9.6 MG/DL (ref 8.5–10.1)
CHLORIDE SERPL-SCNC: 103 MMOL/L (ref 100–111)
CO2 SERPL-SCNC: 29 MMOL/L (ref 21–32)
CREAT SERPL-MCNC: 0.92 MG/DL (ref 0.6–1.3)
DIFFERENTIAL METHOD BLD: ABNORMAL
EOSINOPHIL # BLD: 0 K/UL (ref 0–0.4)
EOSINOPHIL NFR BLD: 0 % (ref 0–5)
ERYTHROCYTE [DISTWIDTH] IN BLOOD BY AUTOMATED COUNT: 13 % (ref 11.6–14.5)
GLOBULIN SER CALC-MCNC: 4.2 G/DL (ref 2–4)
GLUCOSE SERPL-MCNC: 96 MG/DL (ref 74–99)
HCT VFR BLD AUTO: 42.2 % (ref 35–45)
HGB BLD-MCNC: 14 G/DL (ref 12–16)
INR PPP: 1 (ref 0.8–1.2)
LYMPHOCYTES # BLD: 2.4 K/UL (ref 0.9–3.6)
LYMPHOCYTES NFR BLD: 17 % (ref 21–52)
MCH RBC QN AUTO: 31.1 PG (ref 24–34)
MCHC RBC AUTO-ENTMCNC: 33.2 G/DL (ref 31–37)
MCV RBC AUTO: 93.8 FL (ref 74–97)
MONOCYTES # BLD: 1.2 K/UL (ref 0.05–1.2)
MONOCYTES NFR BLD: 8 % (ref 3–10)
NEUTS SEG # BLD: 10.5 K/UL (ref 1.8–8)
NEUTS SEG NFR BLD: 75 % (ref 40–73)
PLATELET # BLD AUTO: 242 K/UL (ref 135–420)
PMV BLD AUTO: 11 FL (ref 9.2–11.8)
POTASSIUM SERPL-SCNC: 4.1 MMOL/L (ref 3.5–5.5)
PROT SERPL-MCNC: 8.3 G/DL (ref 6.4–8.2)
PROTHROMBIN TIME: 13 SEC (ref 11.5–15.2)
RBC # BLD AUTO: 4.5 M/UL (ref 4.2–5.3)
SODIUM SERPL-SCNC: 138 MMOL/L (ref 136–145)
WBC # BLD AUTO: 14.1 K/UL (ref 4.6–13.2)

## 2020-06-21 PROCEDURE — 85610 PROTHROMBIN TIME: CPT

## 2020-06-21 PROCEDURE — 99284 EMERGENCY DEPT VISIT MOD MDM: CPT

## 2020-06-21 PROCEDURE — 96365 THER/PROPH/DIAG IV INF INIT: CPT

## 2020-06-21 PROCEDURE — 74011000250 HC RX REV CODE- 250: Performed by: PHYSICIAN ASSISTANT

## 2020-06-21 PROCEDURE — 74011000258 HC RX REV CODE- 258: Performed by: PHYSICIAN ASSISTANT

## 2020-06-21 PROCEDURE — 99285 EMERGENCY DEPT VISIT HI MDM: CPT

## 2020-06-21 PROCEDURE — 71250 CT THORAX DX C-: CPT

## 2020-06-21 PROCEDURE — 85025 COMPLETE CBC W/AUTO DIFF WBC: CPT

## 2020-06-21 PROCEDURE — 80053 COMPREHEN METABOLIC PANEL: CPT

## 2020-06-21 PROCEDURE — 96367 TX/PROPH/DG ADDL SEQ IV INF: CPT

## 2020-06-21 PROCEDURE — 74011250636 HC RX REV CODE- 250/636: Performed by: PHYSICIAN ASSISTANT

## 2020-06-21 PROCEDURE — 70450 CT HEAD/BRAIN W/O DYE: CPT

## 2020-06-21 PROCEDURE — 74011250637 HC RX REV CODE- 250/637: Performed by: PHYSICIAN ASSISTANT

## 2020-06-21 RX ORDER — LEVETIRACETAM 10 MG/ML
1000 INJECTION INTRAVASCULAR ONCE
Status: COMPLETED | OUTPATIENT
Start: 2020-06-21 | End: 2020-06-21

## 2020-06-21 RX ORDER — ONDANSETRON 4 MG/1
4 TABLET, ORALLY DISINTEGRATING ORAL ONCE
Status: COMPLETED | OUTPATIENT
Start: 2020-06-21 | End: 2020-06-21

## 2020-06-21 RX ADMIN — LEVETIRACETAM 1000 MG: 10 INJECTION INTRAVENOUS at 12:45

## 2020-06-21 RX ADMIN — SODIUM CHLORIDE 5 MG/HR: 900 INJECTION, SOLUTION INTRAVENOUS at 12:58

## 2020-06-21 RX ADMIN — ONDANSETRON 4 MG: 4 TABLET, ORALLY DISINTEGRATING ORAL at 11:29

## 2020-06-21 NOTE — ED PROVIDER NOTES
EMERGENCY DEPARTMENT HISTORY AND PHYSICAL EXAM    10:32 AM      Date: 6/21/2020  Patient Name: Jazz Herrera    History of Presenting Illness     Chief Complaint   Patient presents with    Head Injury    Headache    Fall    Rib Pain       History Provided By: Patient    Chief Complaint: head injury, headache, right rib pain  Duration: 12-14 Hours  Timing:  Acute  Location:   Quality: Aching  Severity: Moderate  Modifying Factors: none  Associated Symptoms: denies any other associated signs or symptoms      Additional History (Context):Razia Cervantes is a 72 y.o. female with a pertinent history of sacroiliitis, depression who presents to the emergency department for evaluation after a head injury which occurred last night. 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 When she fell, she hit the right side of her forehead, causing it to bleed. She also states she injured her right lower rib cage and has inspiratory pain. No associated neck or back pain. No LOC. Patient reports tetanus is up-to-date. She relates an episode of nausea without vomiting and some photophobia. No visual changes. Patient denies any associated fever, chills, diarrhea, abdominal pain, leg pain, numbness, tingling, or any other complaints. PCP:  Arden Stewart MD      Current Facility-Administered Medications   Medication Dose Route Frequency Provider Last Rate Last Dose    levETIRAcetam in saline (iso-os) (KEPPRA) infusion 1,000 mg  1,000 mg IntraVENous ONCE Alannah Hernandez PA-C   1,000 mg at 06/21/20 1245    niCARdipine (CARDENE) 25 mg in 0.9% sodium chloride 250 mL infusion  0-15 mg/hr IntraVENous TITRATE Alannah Hernandez PA-C         Current Outpatient Medications   Medication Sig Dispense Refill    pregabalin (LYRICA) 150 mg capsule Take 1 Cap by mouth two (2) times a day.  Max Daily Amount: 300 mg. 60 Cap 2    DULoxetine (CYMBALTA) 60 mg capsule Take 60 mg by mouth.  tiZANidine (ZANAFLEX) 2 mg tablet Take 4 mg by mouth.  HYDROcodone-acetaminophen (NORCO)  mg tablet Take 1 Tab by mouth.  famotidine (PEPCID) 20 mg tablet Take 1 Tab by mouth two (2) times a day. 30 Tab 0    Lactobacillus Acidoph & Bulgar (FLORANEX) 1 million cell tab tablet Take 2 Tabs by mouth two (2) times a day. 30 Tab 0    amLODIPine (NORVASC) 2.5 mg tablet Take 2 Tabs by mouth daily. 30 Tab 0    calcium-cholecalciferol, d3, (CALCIUM 600 + D) 600-125 mg-unit tab Take 1 Tab by mouth daily.  PYRIDOXINE HCL (VITAMIN B-6 PO) Take 1 Tab by mouth daily.  gabapentin (NEURONTIN) 300 mg capsule TAKE 1 CAPSULE BY MOUTH EVERY MORNING, 2 CAPSULES EVERY AFTERNOON, AND 3 CAPSULES AT NIGHT  4    citalopram (CELEXA) 40 mg tablet Take 40 mg by mouth daily.  melatonin tab tablet Take  by mouth nightly.  alendronate (FOSAMAX) 70 mg tablet Take 70 mg by mouth every seven (7) days.          Past History     Past Medical History:  Past Medical History:   Diagnosis Date    Cancer ([facility] [address]     SKIN    Neuropathy     Psychiatric disorder     DEPRESSION    Sacroiliitis (HCC)        Past Surgical History:  Past Surgical History:   Procedure Laterality Date    ABDOMEN SURGERY PROC UNLISTED      GASTROPLASTY    BREAST SURGERY PROCEDURE UNLISTED      MASTOPLASTY    HX ABDOMINOPLASTY      HX BACK SURGERY      HX CHOLECYSTECTOMY      HX HEENT      BLEPHAROPLASTY    HX HEENT      FOREHEAD LIFT    HX HYSTERECTOMY      HX KNEE ARTHROSCOPY      HX OTHER SURGICAL      EXC OF MELANOMA    HX TONSILLECTOMY         Family History:  Family History   Problem Relation Age of Onset    Diabetes Mother     Heart Disease Mother        Social History:  Social History     Tobacco Use    Smoking status: Never Smoker    Smokeless tobacco: Never Used   Substance Use Topics    Alcohol use: No    Drug use: No       Allergies:  No Known Allergies      Review of Systems Review of Systems   Constitutional: Negative for chills and fever. HENT: Negative for congestion, rhinorrhea and sore throat. Eyes: Positive for photophobia. Negative for visual disturbance. Respiratory: Negative for cough and shortness of breath. Cardiovascular: Positive for chest pain. Gastrointestinal: Positive for nausea. Negative for abdominal pain, blood in stool, constipation, diarrhea and vomiting. Genitourinary: Negative for dysuria, frequency and hematuria. Musculoskeletal: Negative for back pain and myalgias. Skin: Positive for wound. Negative for rash. Neurological: Positive for headaches. Negative for dizziness and syncope. All other systems reviewed and are negative. Physical Exam     Visit Vitals  /86   Pulse 95   Temp 98.5 °F (36.9 °C)   Resp 16   Ht 5' 6\" (1.676 m)   Wt 81.6 kg (180 lb)   SpO2 98%   BMI 29.05 kg/m²       Physical Exam  Vitals signs and nursing note reviewed. Constitutional:       General: She is not in acute distress. Appearance: She is well-developed. She is not diaphoretic. HENT:      Head: Normocephalic. Comments: 2 cm ecchymosis with soft tissue depression noted to the right lateral forehead without laceration. Dried blood noted to the surrounding hair. No periorbital ecchymoses, palacios's sign, or hemotympanum  Eyes:      Conjunctiva/sclera: Conjunctivae normal.   Neck:      Musculoskeletal: Normal range of motion and neck supple. Comments: Patient moving head and neck without pain or difficulty. Cardiovascular:      Rate and Rhythm: Normal rate and regular rhythm. Heart sounds: Normal heart sounds. Pulmonary:      Effort: Pulmonary effort is normal. No respiratory distress. Breath sounds: Normal breath sounds. Comments: Tenderness palpation right lateral inferior rib cage without erythema, edema, ecchymosis, deformity, or crepitus. Lungs are clear to auscultation throughout.   Chest:      Chest wall: Tenderness present. Abdominal:      General: Bowel sounds are normal. There is no distension. Palpations: Abdomen is soft. Tenderness: There is no abdominal tenderness. There is no guarding or rebound. Musculoskeletal:         General: No deformity. Skin:     General: Skin is warm and dry. Neurological:      Mental Status: She is alert and oriented to person, place, and time. Deep Tendon Reflexes: Reflexes are normal and symmetric. Diagnostic Study Results     Labs -  Recent Results (from the past 12 hour(s))   CBC WITH AUTOMATED DIFF    Collection Time: 06/21/20 12:33 PM   Result Value Ref Range    WBC 14.1 (H) 4.6 - 13.2 K/uL    RBC 4.50 4. 20 - 5.30 M/uL    HGB 14.0 12.0 - 16.0 g/dL    HCT 42.2 35.0 - 45.0 %    MCV 93.8 74.0 - 97.0 FL    MCH 31.1 24.0 - 34.0 PG    MCHC 33.2 31.0 - 37.0 g/dL    RDW 13.0 11.6 - 14.5 %    PLATELET 450 565 - 550 K/uL    MPV 11.0 9.2 - 11.8 FL    NEUTROPHILS 75 (H) 40 - 73 %    LYMPHOCYTES 17 (L) 21 - 52 %    MONOCYTES 8 3 - 10 %    EOSINOPHILS 0 0 - 5 %    BASOPHILS 0 0 - 2 %    ABS. NEUTROPHILS 10.5 (H) 1.8 - 8.0 K/UL    ABS. LYMPHOCYTES 2.4 0.9 - 3.6 K/UL    ABS. MONOCYTES 1.2 0.05 - 1.2 K/UL    ABS. EOSINOPHILS 0.0 0.0 - 0.4 K/UL    ABS. BASOPHILS 0.0 0.0 - 0.1 K/UL    DF AUTOMATED         Radiologic Studies -   Ct Head Wo Cont    Result Date: 6/21/2020  EXAM: CT HEAD WITHOUT CONTRAST. CLINICAL HISTORY/INDICATION:  fall, head trauma patient fell evening prior to arrival, complains of headache, photophobia and right rib pain COMPARISON: CT head 7/17/2019. TECHNIQUE: Routine axial images have been obtained from skull base to vertex at 5 mm thick slices.   All CT scans at this facility are performed using dose optimization technique as appropriate to a performed exam, to include automated exposure control, adjustment of the mA and/or kV according to patient's size (including appropriate matching for site-specific examinations), or use of iterative reconstruction technique. FINDINGS: Small right parenchymal frontal lobe hemorrhage measures 9 x 6 x 8 mm. Tiny rim of edema. No midline shift. The gray white differentiation is normal. The ventricular system is midline without mass effect or shift. Cavum septum lucidum. The paranasal sinuses which are included on this exam are well aerated and unremarkable. IMPRESSION: Acute right frontal lobe 9 mm bleed with tiny rim of edema. No midline shift. Report provided to Carlie in the emergency department at 1212 hrs. Ct Chest Wo Cont    Result Date: 6/21/2020  EXAM: CT CHEST WITHOUT CONTRAST. CLINICAL HISTORY/INDICATION:  right rib pain patient fell evening prior to arrival, complains of headache, photophobia and right rib pain COMPARISON: None. TECHNIQUE: Standard helical images were obtained from the thoracic inlet through the adrenals at 5 mm thick sections without intravenous contrast.  Coronal and sagittal reformations obtained. Images were reviewed on both soft tissue, lung, and bone window settings. All CT scans at this facility are performed using dose optimization technique as appropriate to a performed exam, to include automated exposure control, adjustment of the mA and/or kV according to patient's size (including appropriate matching for site-specific examinations), or use of iterative reconstruction technique. FINDINGS: The lungs are clear of infiltrate, opacities, and nodules. There is no evidence of pneumothorax. There is no evidence of mediastinal, hilar, nor axillary adenopathy. Evaluation of the mediastinum and sharona is limited by the lack of IV contrast. The great vessels and thoracic aorta are unremarkable. There are no pleural effusions. The included portion of the of the liver is unremarkable. The adrenal glands are normal. The chest wall soft tissues are unremarkable. Subtle buckle in the cortex of the right lateral seventh rib.      IMPRESSION: Possible nondisplaced fracture of the right lateral seventh rib. Please correlate for point tenderness. Report provided to the emergency department at 1216 hrs. .      Medical Decision Making   I am the first provider for this patient. I reviewed the vital signs, available nursing notes, past medical history, past surgical history, family history and social history. Vital Signs-Reviewed the patient's vital signs. Pulse Oximetry Analysis -  98% on room air (Interpretation)    Records Reviewed: Nursing Notes and Old Medical Records (Time of Review: 10:32 AM)    ED Course: Progress Notes, Reevaluation, and Consults:  11:22 AM: Checked on patient. Apologized for delay and CT. Critical patient went to CT first.  Patient is understanding. Explained that she should go to CT shortly. Cynthia Garcia PA-C    12:46 PM: Discussed case with [facility] transfer center. Standard discussion including mechanism of action, CT results, physical exam, and HPI. Patient is accepted by trauma surgeon, Dr. Mrita Gutierres. Our transfer center will set up transport. Cynthia Garcia PA-C      Provider Notes (Medical Decision Making):   differential diagnosis: Hematoma, abrasion, concussion, less likely ICB, rib fracture, chest wall contusion, less likely pulmonary contusion    Plan: Patient presents ambulatory in no significant distress with normal vitals. Examination reveals traumatic ecchymosis to the right lateral forehead with dried blood noted to the hair. No periorbital ecchymoses or Faustin's sign. Patient reports photophobia without visual changes. Nausea without vomiting.  (+) headache. Patient is not on blood thinners. Lungs are clear to auscultation. CT of head reveals a 9 mm right frontal lobe parenchymal bleed with some surrounding edema. No midline shift. CT chest reveals right lateral seventh rib fracture, nondisplaced. Discussed case with Dr. Celestina Lira. Will bolus with 1g Keppra. Repeat BP now 162/106mmHg.   Will initiate a nicardipine drip to a goal of <140/90mmHg. This was communicated to RN. Will discuss with transfer center at [place] [address]. Critical Care time: 44 minutes    Diagnosis     Clinical Impression:   1. Intracranial bleed ([address]    2. Traumatic injury of head, initial encounter    3. Closed fracture of one rib of right side, initial encounter    4. Fall, initial encounter        Disposition: Transfer    Follow-up Information    None          Patient's Medications   Start Taking    No medications on file   Continue Taking    ALENDRONATE (FOSAMAX) 70 MG TABLET    Take 70 mg by mouth every seven (7) days. AMLODIPINE (NORVASC) 2.5 MG TABLET    Take 2 Tabs by mouth daily. CALCIUM-CHOLECALCIFEROL, D3, (CALCIUM 600 + D) 600-125 MG-UNIT TAB    Take 1 Tab by mouth daily. CITALOPRAM (CELEXA) 40 MG TABLET    Take 40 mg by mouth daily. DULOXETINE (CYMBALTA) 60 MG CAPSULE    Take 60 mg by mouth. FAMOTIDINE (PEPCID) 20 MG TABLET    Take 1 Tab by mouth two (2) times a day. GABAPENTIN (NEURONTIN) 300 MG CAPSULE    TAKE 1 CAPSULE BY MOUTH EVERY MORNING, 2 CAPSULES EVERY AFTERNOON, AND 3 CAPSULES AT NIGHT    HYDROCODONE-ACETAMINOPHEN (NORCO)  MG TABLET    Take 1 Tab by mouth. LACTOBACILLUS ACIDOPH & BULGAR (FLORANEX) 1 MILLION CELL TAB TABLET    Take 2 Tabs by mouth two (2) times a day. MELATONIN TAB TABLET    Take  by mouth nightly. PREGABALIN (LYRICA) 150 MG CAPSULE    Take 1 Cap by mouth two (2) times a day. Max Daily Amount: 300 mg. PYRIDOXINE HCL (VITAMIN B-6 PO)    Take 1 Tab by mouth daily. TIZANIDINE (ZANAFLEX) 2 MG TABLET    Take 4 mg by mouth. These Medications have changed    No medications on file   Stop Taking    No medications on file     _______________________________    This note was dictated utilizing voice recognition software which may lead to typographical errors. I apologize in advance if the situation occurs. If questions arise please do not hesitate to contact me or call our department.   Osmin Lee Phuong Buenrostro PA-C

## 2020-06-21 NOTE — ED NOTES
TRANSFER - OUT REPORT:    Verbal report given to Becky Dey RN (name) on Eelno Lang  being transferred to [facility] for urgent transfer       Report consisted of patients Situation, Background, Assessment and   Recommendations(SBAR). Information from the following report(s) SBAR, Kardex, ED Summary, [facility] and Recent Results was reviewed with the receiving nurse. Lines:   Peripheral IV 06/21/20 Left Antecubital (Active)        Opportunity for questions and clarification was provided.       Patient transported with:   Bulmaro Quintero

## 2020-06-21 NOTE — ED TRIAGE NOTES
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 Pt reports headache, photophobia, and right rib pain. Denies blurry vision.

## 2020-06-21 NOTE — ED NOTES
Departed from the ED to CT department via wheelchair, accompanied by hospital transportation, in stable condition .

## 2020-07-15 ENCOUNTER — HOSPITAL ENCOUNTER (OUTPATIENT)
Dept: NEUROLOGY | Age: 70
Discharge: HOME | End: 2020-07-15
Payer: MEDICARE

## 2020-07-15 DIAGNOSIS — G40.019 BENIGN ROLANDIC EPILEPSY, INTRACTABLE (HCC): ICD-10-CM

## 2020-07-15 DIAGNOSIS — R55 SYNCOPE AND COLLAPSE: ICD-10-CM

## 2020-07-15 DIAGNOSIS — I63.033 CEREBRAL INFARCTION DUE TO BILATERAL THROMBOSIS OF CAROTID ARTERIES (HCC): ICD-10-CM

## 2020-07-15 PROCEDURE — 95819 EEG AWAKE AND ASLEEP: CPT

## 2020-07-16 ENCOUNTER — HOSPITAL ENCOUNTER (OUTPATIENT)
Age: 70
Discharge: HOME | End: 2020-07-16
Payer: MEDICARE

## 2020-07-16 ENCOUNTER — HOSPITAL ENCOUNTER (OUTPATIENT)
Dept: NON INVASIVE DIAGNOSTICS | Age: 70
Discharge: HOME | End: 2020-07-16
Payer: MEDICARE

## 2020-07-16 VITALS
WEIGHT: 180 LBS | SYSTOLIC BLOOD PRESSURE: 104 MMHG | DIASTOLIC BLOOD PRESSURE: 83 MMHG | HEIGHT: 66 IN | BODY MASS INDEX: 28.93 KG/M2

## 2020-07-16 DIAGNOSIS — G40.019 BENIGN ROLANDIC EPILEPSY, INTRACTABLE (HCC): ICD-10-CM

## 2020-07-16 DIAGNOSIS — R55 SYNCOPE AND COLLAPSE: ICD-10-CM

## 2020-07-16 DIAGNOSIS — I63.033 CEREBRAL INFARCTION DUE TO BILATERAL THROMBOSIS OF CAROTID ARTERIES (HCC): ICD-10-CM

## 2020-07-16 LAB
ECHO AO ROOT DIAM: 3.53 CM
ECHO LA AREA 4C: 17.03 CM2
ECHO LA VOL 2C: 34.75 ML (ref 22–52)
ECHO LA VOL 4C: 43.5 ML (ref 22–52)
ECHO LA VOL BP: 43.16 ML (ref 22–52)
ECHO LA VOL/BSA BIPLANE: 22.57 ML/M2 (ref 16–28)
ECHO LA VOLUME INDEX A2C: 18.17 ML/M2 (ref 16–28)
ECHO LA VOLUME INDEX A4C: 22.74 ML/M2 (ref 16–28)
ECHO LV E' LATERAL VELOCITY: 8.64 CM/S
ECHO LV E' SEPTAL VELOCITY: 8.08 CM/S
ECHO LV INTERNAL DIMENSION DIASTOLIC: 4.19 CM (ref 3.9–5.3)
ECHO LV INTERNAL DIMENSION SYSTOLIC: 2.91 CM
ECHO LV IVSD: 1.07 CM (ref 0.6–0.9)
ECHO LV MASS 2D: 168.9 G (ref 67–162)
ECHO LV MASS INDEX 2D: 88.3 G/M2 (ref 43–95)
ECHO LV POSTERIOR WALL DIASTOLIC: 1.25 CM (ref 0.6–0.9)
ECHO LVOT CARDIAC OUTPUT: 5.09 LITER/MINUTE
ECHO LVOT DIAM: 2.23 CM
ECHO LVOT PEAK GRADIENT: 2.74 MMHG
ECHO LVOT PEAK VELOCITY: 82.7 CM/S
ECHO LVOT SV: 70.3 ML
ECHO LVOT VTI: 18.09 CM
ECHO MV A VELOCITY: 68.9 CM/S
ECHO MV E DECELERATION TIME (DT): 0.24 S
ECHO MV E VELOCITY: 67.28 CM/S
ECHO MV E/A RATIO: 0.98
ECHO MV E/E' LATERAL: 7.79
ECHO MV E/E' RATIO (AVERAGED): 8.06
ECHO MV E/E' SEPTAL: 8.33
ECHO PVEIN A DURATION: 0.1 S
ECHO PVEIN A VELOCITY: 33.93 CM/S
ECHO RV TAPSE: 1.72 CM (ref 1.5–2)
ECHO TV REGURGITANT MAX VELOCITY: 189.74 CM/S
ECHO TV REGURGITANT PEAK GRADIENT: 14.4 MMHG
LVOT MG: 1.44 MMHG

## 2020-07-16 PROCEDURE — 74011000250 HC RX REV CODE- 250: Performed by: INTERNAL MEDICINE

## 2020-07-16 PROCEDURE — 70551 MRI BRAIN STEM W/O DYE: CPT

## 2020-07-16 PROCEDURE — 96374 THER/PROPH/DIAG INJ IV PUSH: CPT

## 2020-07-16 PROCEDURE — 93226 XTRNL ECG REC<48 HR SCAN A/R: CPT

## 2020-07-16 PROCEDURE — 70544 MR ANGIOGRAPHY HEAD W/O DYE: CPT

## 2020-07-16 RX ORDER — SODIUM CHLORIDE 9 MG/ML
10 INJECTION INTRAMUSCULAR; INTRAVENOUS; SUBCUTANEOUS
Status: COMPLETED | OUTPATIENT
Start: 2020-07-16 | End: 2020-07-16

## 2020-07-16 RX ADMIN — SODIUM CHLORIDE 10 ML: 9 INJECTION INTRAMUSCULAR; INTRAVENOUS; SUBCUTANEOUS at 11:09

## 2020-07-30 NOTE — PROGRESS NOTES
Called patient regarding return of monitor.  Patient reports that she will return tomorrow 7/31/2020

## 2021-03-12 ENCOUNTER — OFFICE VISIT (OUTPATIENT)
Dept: ORTHOPEDIC SURGERY | Age: 71
End: 2021-03-12
Payer: MEDICARE

## 2021-03-12 VITALS
RESPIRATION RATE: 16 BRPM | SYSTOLIC BLOOD PRESSURE: 135 MMHG | HEIGHT: 66 IN | HEART RATE: 91 BPM | BODY MASS INDEX: 30.05 KG/M2 | WEIGHT: 187 LBS | DIASTOLIC BLOOD PRESSURE: 85 MMHG | TEMPERATURE: 96.6 F | OXYGEN SATURATION: 96 %

## 2021-03-12 DIAGNOSIS — G89.29 CHRONIC LEFT SHOULDER PAIN: ICD-10-CM

## 2021-03-12 DIAGNOSIS — M25.512 CHRONIC LEFT SHOULDER PAIN: ICD-10-CM

## 2021-03-12 DIAGNOSIS — M75.52 CHRONIC BURSITIS OF LEFT SHOULDER: ICD-10-CM

## 2021-03-12 DIAGNOSIS — M25.511 CHRONIC RIGHT SHOULDER PAIN: ICD-10-CM

## 2021-03-12 DIAGNOSIS — G89.29 CHRONIC RIGHT SHOULDER PAIN: ICD-10-CM

## 2021-03-12 DIAGNOSIS — M75.51 CHRONIC BURSITIS OF RIGHT SHOULDER: Primary | ICD-10-CM

## 2021-03-12 PROCEDURE — 20610 DRAIN/INJ JOINT/BURSA W/O US: CPT | Performed by: SPECIALIST

## 2021-03-12 PROCEDURE — 73030 X-RAY EXAM OF SHOULDER: CPT | Performed by: SPECIALIST

## 2021-03-12 PROCEDURE — 1090F PRES/ABSN URINE INCON ASSESS: CPT | Performed by: SPECIALIST

## 2021-03-12 PROCEDURE — 99213 OFFICE O/P EST LOW 20 MIN: CPT | Performed by: SPECIALIST

## 2021-03-12 PROCEDURE — G8536 NO DOC ELDER MAL SCRN: HCPCS | Performed by: SPECIALIST

## 2021-03-12 PROCEDURE — G8432 DEP SCR NOT DOC, RNG: HCPCS | Performed by: SPECIALIST

## 2021-03-12 PROCEDURE — G8399 PT W/DXA RESULTS DOCUMENT: HCPCS | Performed by: SPECIALIST

## 2021-03-12 PROCEDURE — 1101F PT FALLS ASSESS-DOCD LE1/YR: CPT | Performed by: SPECIALIST

## 2021-03-12 PROCEDURE — 3017F COLORECTAL CA SCREEN DOC REV: CPT | Performed by: SPECIALIST

## 2021-03-12 PROCEDURE — G8427 DOCREV CUR MEDS BY ELIG CLIN: HCPCS | Performed by: SPECIALIST

## 2021-03-12 PROCEDURE — G8417 CALC BMI ABV UP PARAM F/U: HCPCS | Performed by: SPECIALIST

## 2021-03-12 RX ORDER — BETAMETHASONE SODIUM PHOSPHATE AND BETAMETHASONE ACETATE 3; 3 MG/ML; MG/ML
6 INJECTION, SUSPENSION INTRA-ARTICULAR; INTRALESIONAL; INTRAMUSCULAR; SOFT TISSUE ONCE
Status: COMPLETED | OUTPATIENT
Start: 2021-03-12 | End: 2021-03-12

## 2021-03-12 RX ADMIN — BETAMETHASONE SODIUM PHOSPHATE AND BETAMETHASONE ACETATE 6 MG: 3; 3 INJECTION, SUSPENSION INTRA-ARTICULAR; INTRALESIONAL; INTRAMUSCULAR; SOFT TISSUE at 16:26

## 2021-03-12 NOTE — PROGRESS NOTES
Patient: Pamela Poole                MRN: [id number]       SSN: [id number]  YOB: 1950        AGE: 80 y.o. SEX: female    PCP: Emilie Singh MD  03/12/21    CC: Gene Gayle SHOULDER PAIN    HISTORY:  Pamela Poole is a 80 y.o. female who is seen for bilateral shoulder pain. She has been experiencing shoulder pain for the past several months. She does not recall any injury. She feels shoulder pain with overhead activities and at night. Her pain keeps her up at night. Her numbness radiates into her arms. She denies neck pain. She is right handed. She was previously seen for back pain. She is s/p parathyroidectomy for hyperparathyroidism . She is s/p lumbosacral fuson surgery by Dr. Roya Caldera. She underwent back surgery many years ago in [place]. Pain Assessment  3/12/2021   Location of Pain Shoulder   Location Modifiers Left   Severity of Pain 6   Quality of Pain Sharp   Duration of Pain A few hours   Frequency of Pain Intermittent   Aggravating Factors (No Data)   Aggravating Factors Comment ROM   Limiting Behavior -   Relieving Factors (No Data)   Relieving Factors Comment norco   Result of Injury No   Work-Related Injury -   Type of Injury -     Occupation, etc:  Ms. Bello Massey is currently receiving workman's comp for her slip-and-fall episode on ice in February of 2015. 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 Her daughter, Sudhir Oviedo, is also a patient. She lives in [place] with her 5 dogs, 2 mini dachshund, one deer-head [place], and 2 dachshund-chihuahua mix. Her deer head [facility] looks like a [place]. She states that she previously worked at the [address] at [employer] many years ago. She returned from a trip to [facility] [place] and came back and was hospitalized for flu. She canceled a trip to [facility] due to the [address].   Current weight is 187 pounds. Weight Metrics 3/12/2021 7/16/2020 6/21/2020 10/14/2019 8/26/2019 7/17/2019 7/9/2019   Weight 187 lb 180 lb 180 lb - - 170 lb 176 lb   BMI 30.18 kg/m2 29.05 kg/m2 29.05 kg/m2 26.63 kg/m2 26.63 kg/m2 26.63 kg/m2 29.29 kg/m2     Patient Active Problem List   Diagnosis Code    Arthritis of sacrum (HCC) M46.98    Cough R05    Diarrhea R19.7    Hypokalemia E87.6    Lactic acidosis E87.2    Vomiting R11.10    Recent foreign travel Z78.9     REVIEW OF SYSTEMS: All Below are Negative except: See HPI   Constitutional: negative for fever, chills, and weight loss. Cardiovascular: negative for chest pain, claudication, leg swelling, SOB, WATERMAN   Gastrointestinal: Negative for pain, N/V/C/D, Blood in stool or urine, dysuria,  hematuria, incontinence, pelvic pain. Musculoskeletal: See HPI   Neurological: Negative for dizziness and weakness. Negative for headaches, Visual changes, confusion, seizures   Phychiatric/Behavioral: Negative for depression, memory loss, substance  abuse. Extremities: Negative for hair changes, rash, or skin lesion changes. Hematologic: Negative for bleeding problems, bruising, pallor or swollen lymph  nodes   Peripheral Vascular: No calf pain, no circulation deficits.     Social History     Socioeconomic History    Marital status:      Spouse name: Not on file    Number of children: Not on file    Years of education: Not on file    Highest education level: Not on file   Occupational History    Not on file   Social Needs    Financial resource strain: Not on file    Food insecurity     Worry: Not on file     Inability: Not on file    Transportation needs     Medical: Not on file     Non-medical: Not on file   Tobacco Use    Smoking status: Never Smoker    Smokeless tobacco: Never Used   Substance and Sexual Activity    Alcohol use: No    Drug use: No    Sexual activity: Not on file   Lifestyle    Physical activity     Days per week: Not on file     Minutes per session: Not on file    Stress: Not on file   Relationships    Social connections     Talks on phone: Not on file     Gets together: Not on file     Attends Rastafari service: Not on file     Active member of club or organization: Not on file     Attends meetings of clubs or organizations: Not on file     Relationship status: Not on file    Intimate partner violence     Fear of current or ex partner: Not on file     Emotionally abused: Not on file     Physically abused: Not on file     Forced sexual activity: Not on file   Other Topics Concern    Not on file   Social History Narrative    Not on file      No Known Allergies   Current Outpatient Medications   Medication Sig    tiZANidine (ZANAFLEX) 2 mg tablet Take 4 mg by mouth.  HYDROcodone-acetaminophen (NORCO)  mg tablet Take 1 Tab by mouth.  gabapentin (NEURONTIN) 300 mg capsule TAKE 1 CAPSULE BY MOUTH EVERY MORNING, 2 CAPSULES EVERY AFTERNOON, AND 3 CAPSULES AT NIGHT    pregabalin (LYRICA) 150 mg capsule Take 1 Cap by mouth two (2) times a day. Max Daily Amount: 300 mg.    DULoxetine (CYMBALTA) 60 mg capsule Take 60 mg by mouth.  famotidine (PEPCID) 20 mg tablet Take 1 Tab by mouth two (2) times a day.  Lactobacillus Acidoph & Bulgar (FLORANEX) 1 million cell tab tablet Take 2 Tabs by mouth two (2) times a day.  amLODIPine (NORVASC) 2.5 mg tablet Take 2 Tabs by mouth daily.  calcium-cholecalciferol, d3, (CALCIUM 600 + D) 600-125 mg-unit tab Take 1 Tab by mouth daily.  PYRIDOXINE HCL (VITAMIN B-6 PO) Take 1 Tab by mouth daily.  citalopram (CELEXA) 40 mg tablet Take 40 mg by mouth daily.  melatonin tab tablet Take  by mouth nightly.  alendronate (FOSAMAX) 70 mg tablet Take 70 mg by mouth every seven (7) days. No current facility-administered medications for this visit.        PHYSICAL EXAMINATION:  Visit Vitals  /85 (BP 1 Location: Left upper arm, BP Patient Position: Sitting, BP Cuff Size: Large adult) Pulse 91   Temp (!) 96.6 °F (35.9 °C) (Temporal)   Resp 16   Ht 5' 6\" (1.676 m)   Wt 187 lb (84.8 kg)   SpO2 96%   BMI 30.18 kg/m²     ORTHO EXAMINATION:  Anteromedial swelling  Examination Right shoulder Left shoulder   Skin Intact Intact   Effusion - -   Biceps deformity - -   Atrophy - -   AC joint tenderness - -   Acromial tenderness + +   Biceps tenderness - -   Forward flexion/Elevation  160   Active abduction  160   External rotation ROM 30 30   Internal rotation ROM 90 80   Apprehension - -   Impingement - -   Drop Arm Test - -   Neurovascular Intact Intact      Chart reviewed for the following:   IIsabela MD, have reviewed the History, Physical and updated the Allergic reactions for [address] [address] performed immediately prior to start of procedure:  Bhupendra Devi MD, have performed the following reviews on [address] [facility] prior to the start of the procedure:            * Patient was identified by name and date of birth   * Agreement on procedure being performed was verified  * Risks and Benefits explained to the patient  * Procedure site verified and marked as necessary  * Patient was positioned for comfort  * Consent was obtained     Time: 4:16 PM     Date of procedure: 3/12/2021    Procedure performed by:  Isabela Boateng MD    Ms. Rojas tolerated the procedure well with no complications. RADIOGRAPHS:  XR BILAT SHOULDER 3/12/21 [facility]  IMPRESSION:  Three views - No fractures, mild acromioclavicular narrowing, no glenohumeral narrowing, no calcific densities ,osteopenia    XR RIGHT KNEE 5/14/19 [facility]  No acute bony abnormality identified. Degenerative joint space narrowing at medial compartment. Chondrocalcinosis consistent with CPPD. IMPRESSION:  Three views - No fractures, no effusion, moderate joint space narrowing, + osteophytes present. Kellgren Barry grade 2.  Chondrocalcinosis c/w pseudogout     XR LEFT KNEE 9/29/16  IMPRESSION:  No fractures, no effusion, moderate joint space narrowing bilateral (L>R), no osteophytes present. IMPRESSION:      ICD-10-CM ICD-9-CM    1. Chronic bursitis of right shoulder  M75.51 726.10 betamethasone (CELESTONE) injection 6 mg      DRAIN/INJECT LARGE JOINT/BURSA   2. Chronic right shoulder pain  M25.511 719.41 AMB POC XRAY, SHOULDER; COMPLETE, 2+    G89.29 338.29 betamethasone (CELESTONE) injection 6 mg      DRAIN/INJECT LARGE JOINT/BURSA   3. Chronic left shoulder pain  M25.512 719.41 AMB POC XRAY, SHOULDER; COMPLETE, 2+    G89.29 338.29 betamethasone (CELESTONE) injection 6 mg      DRAIN/INJECT LARGE JOINT/BURSA   4. Chronic bursitis of left shoulder  M75.52 726.10 betamethasone (CELESTONE) injection 6 mg      DRAIN/INJECT LARGE JOINT/BURSA     PLAN: After discussing treatment options, patient's shoulders were injected with 4 cc Marcaine and 1/2 cc Celestone. There is no need for surgery at this time. We discussed a possible need for shoulder MRI in the future if pain continues. She will follow up as needed.      Scribed by Anny Moore) as dictated by Lawrence Mcmahon MD